# Patient Record
Sex: MALE | Race: ASIAN | NOT HISPANIC OR LATINO | Employment: UNEMPLOYED | URBAN - METROPOLITAN AREA
[De-identification: names, ages, dates, MRNs, and addresses within clinical notes are randomized per-mention and may not be internally consistent; named-entity substitution may affect disease eponyms.]

---

## 2017-09-27 ENCOUNTER — OFFICE VISIT (OUTPATIENT)
Dept: URGENT CARE | Facility: URGENT CARE | Age: 32
End: 2017-09-27
Payer: COMMERCIAL

## 2017-09-27 VITALS
HEART RATE: 87 BPM | OXYGEN SATURATION: 97 % | BODY MASS INDEX: 32.17 KG/M2 | TEMPERATURE: 97.3 F | DIASTOLIC BLOOD PRESSURE: 85 MMHG | SYSTOLIC BLOOD PRESSURE: 135 MMHG | WEIGHT: 211.56 LBS

## 2017-09-27 DIAGNOSIS — L08.9 SKIN INFECTION: Primary | ICD-10-CM

## 2017-09-27 PROCEDURE — 99203 OFFICE O/P NEW LOW 30 MIN: CPT | Performed by: PHYSICIAN ASSISTANT

## 2017-09-27 RX ORDER — SULFAMETHOXAZOLE/TRIMETHOPRIM 800-160 MG
1 TABLET ORAL 2 TIMES DAILY
Qty: 20 TABLET | Refills: 0 | Status: SHIPPED | OUTPATIENT
Start: 2017-09-27 | End: 2021-09-17

## 2017-09-27 NOTE — MR AVS SNAPSHOT
"              After Visit Summary   9/27/2017    Alexander Mccoy    MRN: [id number]           Patient Information     Date Of Birth          1985        Visit Information        Provider Department      9/27/2017 7:35 PM Teri Jorge PA-C [facility]        Today's Diagnoses     Skin infection    -  1      Care Instructions    (L08.9) Skin infection  (primary encounter diagnosis)  Comment:   Plan: sulfamethoxazole-trimethoprim (BACTRIM         DS/SEPTRA DS) 800-160 MG per tablet        Take with food.     Warm compress (such as a hot water bottle: filled with warm water) to the lump for 20 minutes 3 times a day until it has resolved.      Establish care with primary clinic for full physical.                Follow-ups after your visit        Who to contact     If you have questions or need follow up information about today's clinic visit or your schedule please contact [facility] directly at [phone].  Normal or non-critical lab and imaging results will be communicated to you by V2contacthart, letter or phone within 4 business days after the clinic has received the results. If you do not hear from us within 7 days, please contact the clinic through V2contacthart or phone. If you have a critical or abnormal lab result, we will notify you by phone as soon as possible.  Submit refill requests through [employer] or call your pharmacy and they will forward the refill request to us. Please allow 3 business days for your refill to be completed.          Additional Information About Your Visit        V2contacthart Information     [employer] lets you send messages to your doctor, view your test results, renew your prescriptions, schedule appointments and more. To sign up, go to [email or web address] . Click on \"Log in\" on the left side of the screen, which will take you to the Welcome page. Then click on \"Sign up Now\" on the right side of the page.     You will be asked " to enter the access code listed below, as well as some personal information. Please follow the directions to create your username and password.     Your access code is: [email or web address]  Expires: 2017  8:20 PM     Your access code will  in 90 days. If you need help or a new code, please call your [place] clinic or [phone].        Care EveryWhere ID     This is your Care EveryWhere ID. This could be used by other organizations to access your [place] medical records  [id number]        Your Vitals Were     Pulse Temperature Pulse Oximetry BMI (Body Mass Index)          87 97.3  F (36.3  C) (Oral) 97% 32.17 kg/m2         Blood Pressure from Last 3 Encounters:   17 135/85   12 122/79    Weight from Last 3 Encounters:   17 211 lb 9 oz (96 kg)   12 211 lb 12.8 oz (96.1 kg)              Today, you had the following     No orders found for display         Today's Medication Changes          These changes are accurate as of: 17  8:20 PM.  If you have any questions, ask your nurse or doctor.               Start taking these medicines.        Dose/Directions    sulfamethoxazole-trimethoprim 800-160 MG per tablet   Commonly known as:  BACTRIM DS/SEPTRA DS   Used for:  Skin infection   Started by:  Teri Jorge PA-C        Dose:  1 tablet   Take 1 tablet by mouth 2 times daily   Quantity:  20 tablet   Refills:  0            Where to get your medicines      These medications were sent to [facility] [id number] [place] [address] [address] AT [facility] [address]   [address], [facility] [address]    Hours:  24-hours Phone:  [phone]     sulfamethoxazole-trimethoprim 800-160 MG per tablet                Primary Care Provider    None Specified       No primary provider on file.        Equal Access to Services     MARCEL MCKOY AH: Hui Elaine, waaxda luqadaha, qaybta kaalmaarturo sosa. So  [facility] [phone].    ATENCIÓN: Si melindala mel, tiene a marshall disposición servicios gratuitos de asistencia lingüística. Leo stanford [phone].    We comply with applicable federal civil rights laws and [place] laws. We do not discriminate on the basis of race, color, national origin, age, disability sex, sexual orientation or gender identity.            Thank you!     Thank you for choosing [facility]  for your care. Our goal is always to provide you with excellent care. Hearing back from our patients is one way we can continue to improve our services. Please take a few minutes to complete the written survey that you may receive in the mail after your visit with us. Thank you!             Your Updated Medication List - Protect others around you: Learn how to safely use, store and throw away your medicines at www.disposemymeds.org.          This list is accurate as of: 9/27/17  8:20 PM.  Always use your most recent med list.                   Brand Name Dispense Instructions for use Diagnosis    sulfamethoxazole-trimethoprim 800-160 MG per tablet    BACTRIM DS/SEPTRA DS    20 tablet    Take 1 tablet by mouth 2 times daily    Skin infection

## 2017-09-28 NOTE — NURSING NOTE
"Chief Complaint   Patient presents with     Elbow right     swelling of right elbow.      Urgent Care     request lab testing to be checked for diabetes.       Initial /85  Pulse 87  Temp 97.3  F (36.3  C) (Oral)  Wt 211 lb 9 oz (96 kg)  SpO2 97%  BMI 32.17 kg/m2 Estimated body mass index is 32.17 kg/(m^2) as calculated from the following:    Height as of 3/27/12: 5' 8\" (1.727 m).    Weight as of this encounter: 211 lb 9 oz (96 kg).  Medication Reconciliation: complete    "

## 2017-09-28 NOTE — PATIENT INSTRUCTIONS
(L08.9) Skin infection  (primary encounter diagnosis)  Comment:   Plan: sulfamethoxazole-trimethoprim (BACTRIM         DS/SEPTRA DS) 800-160 MG per tablet        Take with food.     Warm compress (such as a hot water bottle: filled with warm water) to the lump for 20 minutes 3 times a day until it has resolved.      Establish care with primary clinic for full physical.

## 2017-09-28 NOTE — PROGRESS NOTES
SUBJECTIVE:  Alexander Mccoy is a 32 year old male who presents to the clinic today for  1) red lump on his right elbow for the past couple of weeks, worsening.    No fevers.     He does not currently have a primary clinic.  No family history of diabetes    No itching.       Symptoms are moderate    He would like to have a checkup    Past Medical History:   Diagnosis Date     Asthma     as a kid       No Known Allergies  Social History   Substance Use Topics     Smoking status: Never Smoker     Smokeless tobacco: Never Used     Alcohol use No       ROS:  CONSTITUTIONAL:NEGATIVE for fever, chills, change in weight  INTEGUMENTARY/SKIN: as per HPI  MUSCULOSKELETAL: NEGATIVE for significant arthralgias or myalgia    EXAM:   /85  Pulse 87  Temp 97.3  F (36.3  C) (Oral)  Wt 211 lb 9 oz (96 kg)  SpO2 97%  BMI 32.17 kg/m2  GENERAL: alert, no acute distress.  SKIN:   Right elbow: erythematous raised lump on lateral elbow with central scab.  Tender.  No drainage.  1 cm in diameter. PAtient has a few other healing excoriated lesions on forearms. No other rashes or lesions.    EXT: elbow with FROM.  VASC: cap refill is less than 2 seconds.        (L08.9) Skin infection  (primary encounter diagnosis)  Comment:   Plan: sulfamethoxazole-trimethoprim (BACTRIM         DS/SEPTRA DS) 800-160 MG per tablet        Take with food.     Warm compress (such as a hot water bottle: filled with warm water) to the lump for 20 minutes 3 times a day until it has resolved.      Establish care with primary clinic for full physical.        Patient expresses understanding and agreement with the assessment and plan as above.

## 2018-11-12 ENCOUNTER — THERAPY VISIT (OUTPATIENT)
Dept: PHYSICAL THERAPY | Facility: CLINIC | Age: 33
End: 2018-11-12
Payer: COMMERCIAL

## 2018-11-12 DIAGNOSIS — M54.41 MIDLINE LOW BACK PAIN WITH RIGHT-SIDED SCIATICA: Primary | ICD-10-CM

## 2018-11-12 PROCEDURE — 97161 PT EVAL LOW COMPLEX 20 MIN: CPT | Mod: GP | Performed by: PHYSICAL THERAPIST

## 2018-11-12 PROCEDURE — 97110 THERAPEUTIC EXERCISES: CPT | Mod: GP | Performed by: PHYSICAL THERAPIST
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
## 2018-11-12 NOTE — MR AVS SNAPSHOT
After Visit Summary   11/12/2018    Alexander Mccoy    MRN: [id number]           Patient Information     Date Of Birth          1985        Visit Information        Provider Department      11/12/2018 4:00 PM Radha Knight, PT [facility] Physical Therapy        Today's Diagnoses     Midline low back pain with right-sided sciatica    -  1       Follow-ups after your visit        Your next 10 appointments already scheduled     Nov 19, 2018  4:00 PM CST   YAJAIR[facility] Spine with Radha Knight PT   [facility] Physical Therapy ([facility]  )    [address]  [facility] [address]   [phone]            Nov 23, 2018  4:00 PM CST   YAJAIRA Spine with Radha Ludwig, PT   [facility] Physical Therapy ([facility]  )    [address]  [facility] [address]   [phone]            Nov 26, 2018  4:00 PM CST   YAJAIRA Spine with Radha Knight PT   [facility] Physical Therapy ([facility]  )    [address]  [facility] [address]   [phone]            Nov 30, 2018  4:00 PM CST   YAJAIRA Spine with Radha Ludwig, PT   [facility] Physical Therapy ([facility]  )    [address]  [facility] [address]   [phone]            Dec 03, 2018  4:00 PM CST   YAJAIRA Spine with Radha Knight PT   [facility] Physical Therapy ([facility]  )    [address]  [facility] [address]   [phone]            Dec 07, 2018  4:00 PM CST   YAJAIRA Spine with Radha Ludwig, PT   [facility] Physical Therapy ([facility]  )    [address]  [facility] [address]   [phone]              Who to contact     If you have questions or need follow up information about today's clinic visit or your  "schedule please contact [facility] PHYSICAL THERAP[facility] directly at [phone].  Normal or non-critical lab and imaging results will be communicated to you by MyChart, letter or phone within 4 business days after the clinic has received the results. If you do not hear from us within 7 days, please contact the clinic through [employer] or phone. If you have a critical or abnormal lab result, we will notify you by phone as soon as possible.  Submit refill requests through [employer] or call your pharmacy and they will forward the refill request to us. Please allow 3 business days for your refill to be completed.          Additional Information About Your Visit        [employer] Information     [employer] lets you send messages to your doctor, view your test results, renew your prescriptions, schedule appointments and more. To sign up, go to [email or web address] . Click on \"Log in\" on the left side of the screen, which will take you to the Welcome page. Then click on \"Sign up Now\" on the right side of the page.     You will be asked to enter the access code listed below, as well as some personal information. Please follow the directions to create your username and password.     Your access code is: [email or web address]  Expires: 2/10/2019  6:15 PM     Your access code will  in 90 days. If you need help or a new code, please call your [place] clinic or [phone].        Care EveryWhere ID     This is your Care EveryWhere ID. This could be used by other organizations to access your [place] medical records  [id number]         Blood Pressure from Last 3 Encounters:   17 135/85   12 122/79    Weight from Last 3 Encounters:   17 96 kg (211 lb 9 oz)   12 96.1 kg (211 lb 12.8 oz)              We Performed the Following     HC PT EVAL, LOW COMPLEXITY     YAJAIRA INITIAL EVAL REPORT     THERAPEUTIC EXERCISES        Primary Care Provider Fax #    Physician No Ref-Primary " [phone]       No address on file        Equal Access to Services     MARCEL EAN : Hadii aad ku hadgracemadison Lisadavid, wabrennatara berkowitz, maciejsteph torreznelsontara escamilla, arturo quickmathewdexter simmons. So [facility] [phone].    ATENCIÓN: Si habla español, tiene a marshall disposición servicios gratuitos de asistencia lingüística. Llame al [phone].    We comply with applicable federal civil rights laws and [place] laws. We do not discriminate on the basis of race, color, national origin, age, disability, sex, sexual orientation, or gender identity.            Thank you!     Thank you for choosing [place] [facility] PHYSICAL THERAPY  for your care. Our goal is always to provide you with excellent care. Hearing back from our patients is one way we can continue to improve our services. Please take a few minutes to complete the written survey that you may receive in the mail after your visit with us. Thank you!             Your Updated Medication List - Protect others around you: Learn how to safely use, store and throw away your medicines at www.disposemymeds.org.          This list is accurate as of 11/12/18  6:15 PM.  Always use your most recent med list.                   Brand Name Dispense Instructions for use Diagnosis    sulfamethoxazole-trimethoprim 800-160 MG per tablet    BACTRIM DS/SEPTRA DS    20 tablet    Take 1 tablet by mouth 2 times daily    Skin infection
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
## 2018-11-23 ENCOUNTER — THERAPY VISIT (OUTPATIENT)
Dept: PHYSICAL THERAPY | Facility: CLINIC | Age: 33
End: 2018-11-23
Payer: COMMERCIAL

## 2018-11-23 DIAGNOSIS — M54.41 MIDLINE LOW BACK PAIN WITH RIGHT-SIDED SCIATICA: ICD-10-CM

## 2018-11-23 PROCEDURE — 97110 THERAPEUTIC EXERCISES: CPT | Mod: GP | Performed by: PHYSICAL THERAPIST

## 2018-11-23 PROCEDURE — 97530 THERAPEUTIC ACTIVITIES: CPT | Mod: GP | Performed by: PHYSICAL THERAPIST

## 2018-11-23 PROCEDURE — 97112 NEUROMUSCULAR REEDUCATION: CPT | Mod: GP | Performed by: PHYSICAL THERAPIST

## 2018-11-26 ENCOUNTER — THERAPY VISIT (OUTPATIENT)
Dept: PHYSICAL THERAPY | Facility: CLINIC | Age: 33
End: 2018-11-26
Payer: COMMERCIAL

## 2018-11-26 DIAGNOSIS — M54.41 CHRONIC MIDLINE LOW BACK PAIN WITH RIGHT-SIDED SCIATICA: ICD-10-CM

## 2018-11-26 DIAGNOSIS — G89.29 CHRONIC MIDLINE LOW BACK PAIN WITH RIGHT-SIDED SCIATICA: ICD-10-CM

## 2018-11-26 PROCEDURE — 97110 THERAPEUTIC EXERCISES: CPT | Mod: GP | Performed by: PHYSICAL THERAPIST

## 2018-11-26 PROCEDURE — 97140 MANUAL THERAPY 1/> REGIONS: CPT | Mod: GP | Performed by: PHYSICAL THERAPIST

## 2018-11-26 PROCEDURE — 97112 NEUROMUSCULAR REEDUCATION: CPT | Mod: GP | Performed by: PHYSICAL THERAPIST

## 2018-12-03 ENCOUNTER — THERAPY VISIT (OUTPATIENT)
Dept: PHYSICAL THERAPY | Facility: CLINIC | Age: 33
End: 2018-12-03
Payer: COMMERCIAL

## 2018-12-03 DIAGNOSIS — M54.41 CHRONIC MIDLINE LOW BACK PAIN WITH RIGHT-SIDED SCIATICA: ICD-10-CM

## 2018-12-03 DIAGNOSIS — G89.29 CHRONIC MIDLINE LOW BACK PAIN WITH RIGHT-SIDED SCIATICA: ICD-10-CM

## 2018-12-03 PROCEDURE — 97110 THERAPEUTIC EXERCISES: CPT | Mod: GP | Performed by: PHYSICAL THERAPIST

## 2018-12-03 PROCEDURE — 97140 MANUAL THERAPY 1/> REGIONS: CPT | Mod: GP | Performed by: PHYSICAL THERAPIST

## 2018-12-07 ENCOUNTER — THERAPY VISIT (OUTPATIENT)
Dept: PHYSICAL THERAPY | Facility: CLINIC | Age: 33
End: 2018-12-07
Payer: COMMERCIAL

## 2018-12-07 DIAGNOSIS — G89.29 CHRONIC MIDLINE LOW BACK PAIN WITH RIGHT-SIDED SCIATICA: ICD-10-CM

## 2018-12-07 DIAGNOSIS — M54.41 CHRONIC MIDLINE LOW BACK PAIN WITH RIGHT-SIDED SCIATICA: ICD-10-CM

## 2018-12-07 PROCEDURE — 97110 THERAPEUTIC EXERCISES: CPT | Mod: GP | Performed by: PHYSICAL THERAPIST

## 2018-12-07 PROCEDURE — 97530 THERAPEUTIC ACTIVITIES: CPT | Mod: GP | Performed by: PHYSICAL THERAPIST

## 2018-12-14 ENCOUNTER — THERAPY VISIT (OUTPATIENT)
Dept: PHYSICAL THERAPY | Facility: CLINIC | Age: 33
End: 2018-12-14
Payer: COMMERCIAL

## 2018-12-14 DIAGNOSIS — M54.41 CHRONIC MIDLINE LOW BACK PAIN WITH RIGHT-SIDED SCIATICA: ICD-10-CM

## 2018-12-14 DIAGNOSIS — G89.29 CHRONIC MIDLINE LOW BACK PAIN WITH RIGHT-SIDED SCIATICA: ICD-10-CM

## 2018-12-14 PROCEDURE — 97110 THERAPEUTIC EXERCISES: CPT | Mod: GP | Performed by: PHYSICAL THERAPIST

## 2018-12-14 PROCEDURE — 97530 THERAPEUTIC ACTIVITIES: CPT | Mod: GP | Performed by: PHYSICAL THERAPIST

## 2018-12-28 ENCOUNTER — THERAPY VISIT (OUTPATIENT)
Dept: PHYSICAL THERAPY | Facility: CLINIC | Age: 33
End: 2018-12-28
Payer: COMMERCIAL

## 2018-12-28 DIAGNOSIS — G89.29 CHRONIC MIDLINE LOW BACK PAIN WITH RIGHT-SIDED SCIATICA: ICD-10-CM

## 2018-12-28 DIAGNOSIS — M54.41 CHRONIC MIDLINE LOW BACK PAIN WITH RIGHT-SIDED SCIATICA: ICD-10-CM

## 2018-12-28 PROCEDURE — 97530 THERAPEUTIC ACTIVITIES: CPT | Mod: GP | Performed by: PHYSICAL THERAPIST

## 2018-12-28 PROCEDURE — 97110 THERAPEUTIC EXERCISES: CPT | Mod: GP | Performed by: PHYSICAL THERAPIST

## 2019-01-11 ENCOUNTER — THERAPY VISIT (OUTPATIENT)
Dept: PHYSICAL THERAPY | Facility: CLINIC | Age: 34
End: 2019-01-11
Payer: COMMERCIAL

## 2019-01-11 DIAGNOSIS — G89.29 CHRONIC MIDLINE LOW BACK PAIN WITH RIGHT-SIDED SCIATICA: ICD-10-CM

## 2019-01-11 DIAGNOSIS — M54.41 CHRONIC MIDLINE LOW BACK PAIN WITH RIGHT-SIDED SCIATICA: ICD-10-CM

## 2019-01-11 PROCEDURE — 97530 THERAPEUTIC ACTIVITIES: CPT | Mod: GP | Performed by: PHYSICAL THERAPIST

## 2019-01-11 PROCEDURE — 97110 THERAPEUTIC EXERCISES: CPT | Mod: GP | Performed by: PHYSICAL THERAPIST

## 2019-01-18 ENCOUNTER — THERAPY VISIT (OUTPATIENT)
Dept: PHYSICAL THERAPY | Facility: CLINIC | Age: 34
End: 2019-01-18
Payer: COMMERCIAL

## 2019-01-18 DIAGNOSIS — M54.41 CHRONIC MIDLINE LOW BACK PAIN WITH RIGHT-SIDED SCIATICA: ICD-10-CM

## 2019-01-18 DIAGNOSIS — G89.29 CHRONIC MIDLINE LOW BACK PAIN WITH RIGHT-SIDED SCIATICA: ICD-10-CM

## 2019-01-18 PROCEDURE — 97530 THERAPEUTIC ACTIVITIES: CPT | Mod: GP | Performed by: PHYSICAL THERAPIST

## 2019-01-18 PROCEDURE — 97110 THERAPEUTIC EXERCISES: CPT | Mod: GP | Performed by: PHYSICAL THERAPIST

## 2019-03-01 ENCOUNTER — THERAPY VISIT (OUTPATIENT)
Dept: PHYSICAL THERAPY | Facility: CLINIC | Age: 34
End: 2019-03-01
Payer: COMMERCIAL

## 2019-03-01 DIAGNOSIS — M54.41 CHRONIC MIDLINE LOW BACK PAIN WITH RIGHT-SIDED SCIATICA: ICD-10-CM

## 2019-03-01 DIAGNOSIS — G89.29 CHRONIC MIDLINE LOW BACK PAIN WITH RIGHT-SIDED SCIATICA: ICD-10-CM

## 2019-03-01 PROCEDURE — 97110 THERAPEUTIC EXERCISES: CPT | Mod: GP | Performed by: PHYSICAL THERAPIST

## 2019-03-01 PROCEDURE — 97112 NEUROMUSCULAR REEDUCATION: CPT | Mod: GP | Performed by: PHYSICAL THERAPIST

## 2019-03-01 NOTE — LETTER
"[place] FOR ATHLETIC [facility] PHYSICAL THERAPY  [address] [place] [id number] [phone]    2019    Re: Alexander Mccoy   :   1985  MRN:  [id number]   REFERRING PHYSICIAN:   Toi Aldrich    [facility] ATHLETIC [facility] PHYSICAL THERAPY    Date of Initial Evaluation:  2018  Visits:  Rxs Used: 10  Reason for Referral:  Chronic midline low back pain with right-sided sciatica    DISCHARGE REPORT  Progress reporting period is from 18 to 3-1-18, 10 visits (6 week gap between last 2 visits).       SUBJECTIVE  Patient initially had c/o back pain to the upper calf, increased pain with walking (1/2 mile), standing (after 1.5-2 hours, 7-8/10 pain if working more than 6 hours, 5-6/10.) as well as tingling and numbness.      At last visit today he had not been in for PT due to \"personal reasons\" (per patient) and needing to find another job, etc.  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  Pain is near constant left buttock, up to 8/10, intermittent left LB and left posterior thigh and calf.  Pain increases with leaning to the right, sitting >1.5 to 2 hours produces numbness left LE,  walking/standing 1-2 hours, unable to get to sleep up to 3 hours/wakes intermittently with moving in bed.  Overall thinks he is improving - pain less irritable overall per patient.  Patient reports he has not been doing exercises.  When sitting uses a \"couch pillow\" behind his back, otherwise not sitting with support.  Has not been doing exercises except for \"stretching\" routine at work before start of shift and alternate knee extension in sitting intermittently.   *Subjective examination very difficult as patient was not able to answer questions directly and had to ask each several times/different way to get answer and still patient was unclear; cognitive difficulty.     Current Pain level: 6/10(left buttock pain).     Initial Pain " level: 8/10.   Changes in function:  questionable  Adverse reaction to treatment or activity: None    OBJECTIVE  Today trunk extension AROM 33%, flexion 50% with pain.    Patient demonstrates poor sitting posture.    **Very difficult to get patient to follow instructions and do positioning/exercises and relay specific symptoms during/after.            ASSESSMENT/PLAN  Updated problem list and treatment plan: Diagnosis 1:  LB and left LE pain  Pain -  home program  Decreased ROM/flexibility - home program  Decreased function - home program  Impaired posture - home program  STG/LTGs have been met or progress has been made towards goals:  None  Assessment of Progress: Not making progress - difficult due to inconsistency with visits and non-compliance with home program as prescribed  Self Management Plans:  Patient has been instructed in a home treatment program.    Recommendations:  This patient is ready to be discharged from therapy and continue their home treatment program due to lack of progress and patient follow-through.  Patient to return to his MD for further evaluation.    Thank you for your referral.    INQUIRIES  Therapist: Alisha Palma PT   [facility] [place] [facility]  [address] [address]  Phone: [phone]  Fax: [phone]

## 2019-03-04 PROBLEM — M54.41 MIDLINE LOW BACK PAIN WITH RIGHT-SIDED SCIATICA: Status: RESOLVED | Noted: 2018-11-12 | Resolved: 2019-03-04

## 2019-03-04 NOTE — PROGRESS NOTES
"Subjective:  HPI                    Objective:  System    Physical Exam    General     ROS    Assessment/Plan:    DISCHARGE REPORT    Progress reporting period is from 11-21-18 to 3-1-18, 10 visits (6 week gap between last 2 visits).       SUBJECTIVE  Patient initially had c/o back pain to the upper calf, increased pain with walking (1/2 mile), standing (after 1.5-2 hours, 7-8/10 pain if working more than 6 hours, 5-6/10.) as well as tingling and numbness.      At last visit today he had not been in for PT due to \"personal reasons\" (per patient) and needing to find another job, etc.  Now working FT in warehouse, driving forklift and moving product.  Pain is near constant left buttock, up to 8/10, intermittent left LB and left posterior thigh and calf.  Pain increases with leaning to the right, sitting >1.5 to 2 hours produces numbness left LE,  walking/standing 1-2 hours, unable to get to sleep up to 3 hours/wakes intermittently with moving in bed.  Overall thinks he is improving - pain less irritable overall per patient.  Patient reports he has not been doing exercises.  When sitting uses a \"couch pillow\" behind his back, otherwise not sitting with support.  Has not been doing exercises except for \"stretching\" routine at work before start of shift and alternate knee extension in sitting intermittently.   *Subjective examination very difficult as patient was not able to answer questions directly and had to ask each several times/different way to get answer and still patient was unclear; cognitive difficulty.     Current Pain level: 6/10(left buttock pain).      Initial Pain level: 8/10.   Changes in function:  questionable  Adverse reaction to treatment or activity: None    OBJECTIVE  Today trunk extension AROM 33%, flexion 50% with pain.    Patient demonstrates poor sitting posture.    **Very difficult to get patient to follow instructions and do positioning/exercises and relay specific symptoms during/after.  "     ASSESSMENT/PLAN  Updated problem list and treatment plan: Diagnosis 1:  LB and left LE pain    Pain -  home program  Decreased ROM/flexibility - home program  Decreased function - home program  Impaired posture - home program  STG/LTGs have been met or progress has been made towards goals:  None  Assessment of Progress: Not making progress - difficult due to inconsistency with visits and non-compliance with home program as prescribed  Self Management Plans:  Patient has been instructed in a home treatment program.      Recommendations:  This patient is ready to be discharged from therapy and continue their home treatment program due to lack of progress and patient follow-through.  Patient to return to his MD for further evaluation.    Please refer to the daily flowsheet for treatment today, total treatment time and time spent performing 1:1 timed codes.

## 2021-09-17 ENCOUNTER — OFFICE VISIT (OUTPATIENT)
Dept: URGENT CARE | Facility: URGENT CARE | Age: 36
End: 2021-09-17
Payer: COMMERCIAL

## 2021-09-17 ENCOUNTER — ANCILLARY PROCEDURE (OUTPATIENT)
Dept: GENERAL RADIOLOGY | Facility: CLINIC | Age: 36
End: 2021-09-17
Attending: PHYSICIAN ASSISTANT
Payer: COMMERCIAL

## 2021-09-17 VITALS
BODY MASS INDEX: 28.13 KG/M2 | SYSTOLIC BLOOD PRESSURE: 134 MMHG | DIASTOLIC BLOOD PRESSURE: 78 MMHG | TEMPERATURE: 98.7 F | WEIGHT: 185 LBS | OXYGEN SATURATION: 98 % | HEART RATE: 105 BPM

## 2021-09-17 DIAGNOSIS — M54.50 LUMBAR PAIN WITH RADIATION DOWN RIGHT LEG: ICD-10-CM

## 2021-09-17 DIAGNOSIS — M79.604 LUMBAR PAIN WITH RADIATION DOWN RIGHT LEG: ICD-10-CM

## 2021-09-17 DIAGNOSIS — E11.69 TYPE 2 DIABETES MELLITUS WITH OTHER SPECIFIED COMPLICATION, UNSPECIFIED WHETHER LONG TERM INSULIN USE (H): ICD-10-CM

## 2021-09-17 DIAGNOSIS — R52 PAIN: ICD-10-CM

## 2021-09-17 DIAGNOSIS — Z91.199 NON COMPLIANCE WITH MEDICAL TREATMENT: ICD-10-CM

## 2021-09-17 DIAGNOSIS — R52 PAIN: Primary | ICD-10-CM

## 2021-09-17 LAB
ALBUMIN UR-MCNC: NEGATIVE MG/DL
APPEARANCE UR: CLEAR
BILIRUB UR QL STRIP: NEGATIVE
COLOR UR AUTO: YELLOW
GLUCOSE UR STRIP-MCNC: >=1000 MG/DL
HGB UR QL STRIP: NEGATIVE
KETONES UR STRIP-MCNC: NEGATIVE MG/DL
LEUKOCYTE ESTERASE UR QL STRIP: NEGATIVE
NITRATE UR QL: NEGATIVE
PH UR STRIP: 6 [PH] (ref 5–7)
SP GR UR STRIP: 1.01 (ref 1–1.03)
UROBILINOGEN UR STRIP-ACNC: 0.2 E.U./DL

## 2021-09-17 PROCEDURE — 81003 URINALYSIS AUTO W/O SCOPE: CPT | Performed by: PHYSICIAN ASSISTANT

## 2021-09-17 PROCEDURE — 72100 X-RAY EXAM L-S SPINE 2/3 VWS: CPT | Performed by: RADIOLOGY

## 2021-09-17 PROCEDURE — 99204 OFFICE O/P NEW MOD 45 MIN: CPT | Performed by: PHYSICIAN ASSISTANT

## 2021-09-17 RX ORDER — METHOCARBAMOL 750 MG/1
750 TABLET, FILM COATED ORAL 4 TIMES DAILY PRN
Qty: 30 TABLET | Refills: 0 | Status: SHIPPED | OUTPATIENT
Start: 2021-09-17 | End: 2023-04-18

## 2021-09-17 RX ORDER — HYDROCODONE BITARTRATE AND ACETAMINOPHEN 5; 325 MG/1; MG/1
1 TABLET ORAL EVERY 6 HOURS PRN
Qty: 10 TABLET | Refills: 0 | Status: SHIPPED | OUTPATIENT
Start: 2021-09-17 | End: 2021-09-20

## 2021-09-22 NOTE — PROGRESS NOTES
Assessment & Plan     Pain  UA negative for infection  - UA macro with reflex to Microscopic and Culture - Clinc Collect  - XR Lumbar Spine 2/3 Views; Future  - UA with Microscopic reflex to Culture  - HYDROcodone-acetaminophen (NORCO) 5-325 MG tablet; Take 1 tablet by mouth every 6 hours as needed for severe pain    Lumbar pain with radiation down right leg  Lumbar xray Negative for acute findings, read by Lawrence Maldonado PA-C [facility] at time of visit.  Vicodin for pain  Robaxin for muscle relaxation  - XR Lumbar Spine 2/3 Views; Future  - UA with Microscopic reflex to Culture  - HYDROcodone-acetaminophen (NORCO) 5-325 MG tablet; Take 1 tablet by mouth every 6 hours as needed for severe pain  - methocarbamol (ROBAXIN) 750 MG tablet; Take 1 tablet (750 mg) by mouth 4 times daily as needed    Type 2 diabetes mellitus with other specified complication, unspecified whether long term insulin use (H)  Monitor blood sugars while on steroids    Non compliance with medical treatment  Advise to have follow up with PCP for diabetes as he is not taking medications nor is he talking care of his diabetes      Review of external notes as documented elsewhere in note         CONSULTATION/REFERRAL to PCP    Lawrence Maldonado PA-C  [facility] LUIS Munoz is a 36 year old who presents for the following health issues     HPI     Lower back pain   Radicular pain into right leg  Patient has diabetes but isnt taking medications    Review of Systems   Constitutional, HEENT, cardiovascular, pulmonary, gi and gu systems are negative, except as otherwise noted.      Objective    /78   Pulse 105   Temp 98.7  F (37.1  C)   Wt 83.9 kg (185 lb)   SpO2 98%   BMI 28.13 kg/m    Body mass index is 28.13 kg/m .  Physical Exam   GENERAL: healthy, alert and no distress  RESP: lungs clear to auscultation - no rales, rhonchi or wheezes  CV: regular rate and rhythm, normal S1 S2, no S3 or S4, no murmur, click or  rub, no peripheral edema and peripheral pulses strong  ABDOMEN: soft, nontender, no hepatosplenomegaly, no masses and bowel sounds normal  MS: Positive for lower back tenderness lumbar with radicular   SKIN: no suspicious lesions or rashes  NEURO: Positive for radicular pain  PSYCH: mentation appears normal, affect normal/bright    Results for orders placed or performed in visit on 09/17/21   XR Lumbar Spine 2/3 Views     Status: None    Narrative    LUMBAR SPINE TWO - THREE VIEWS  9/17/2021 5:01 PM     HISTORY: Pain; Lumbar pain with radiation down right leg.    COMPARISON: None.      Impression    IMPRESSION: Minimal anterior wedging of the T11 vertebral body. This  could be physiologic. Recommend correlation for point tenderness in  this region. Otherwise, the vertebral body heights appear grossly  maintained. Alignment appears normal. The intervertebral disc spaces  are grossly maintained.    SOBIA SHELTON MD         SYSTEM ID:  [id number]   Results for orders placed or performed in visit on 09/17/21   UA macro with reflex to Microscopic and Culture - Clinc Collect     Status: Abnormal    Specimen: Urine, Midstream   Result Value Ref Range    Color Urine Yellow Colorless, Straw, Light Yellow, Yellow    Appearance Urine Clear Clear    Glucose Urine >=1000 (A) Negative mg/dL    Bilirubin Urine Negative Negative    Ketones Urine Negative Negative mg/dL    Specific Gravity Urine 1.010 1.003 - 1.035    Blood Urine Negative Negative    pH Urine 6.0 5.0 - 7.0    Protein Albumin Urine Negative Negative mg/dL    Urobilinogen Urine 0.2 0.2, 1.0 E.U./dL    Nitrite Urine Negative Negative    Leukocyte Esterase Urine Negative Negative    Narrative    Microscopic not indicated

## 2021-09-29 ENCOUNTER — OFFICE VISIT (OUTPATIENT)
Dept: URGENT CARE | Facility: URGENT CARE | Age: 36
End: 2021-09-29
Payer: COMMERCIAL

## 2021-09-29 ENCOUNTER — APPOINTMENT (OUTPATIENT)
Dept: URGENT CARE | Facility: URGENT CARE | Age: 36
End: 2021-09-29
Payer: COMMERCIAL

## 2021-09-29 VITALS
SYSTOLIC BLOOD PRESSURE: 120 MMHG | DIASTOLIC BLOOD PRESSURE: 80 MMHG | BODY MASS INDEX: 28.13 KG/M2 | RESPIRATION RATE: 20 BRPM | WEIGHT: 185 LBS | HEART RATE: 105 BPM | TEMPERATURE: 98.1 F | OXYGEN SATURATION: 97 %

## 2021-09-29 DIAGNOSIS — Z20.822 SUSPECTED COVID-19 VIRUS INFECTION: ICD-10-CM

## 2021-09-29 DIAGNOSIS — R52 PAIN: Primary | ICD-10-CM

## 2021-09-29 LAB
ALBUMIN UR-MCNC: NEGATIVE MG/DL
APPEARANCE UR: CLEAR
BILIRUB UR QL STRIP: NEGATIVE
COLOR UR AUTO: YELLOW
GLUCOSE UR STRIP-MCNC: >=1000 MG/DL
HGB UR QL STRIP: NEGATIVE
KETONES UR STRIP-MCNC: NEGATIVE MG/DL
LEUKOCYTE ESTERASE UR QL STRIP: NEGATIVE
NITRATE UR QL: NEGATIVE
PH UR STRIP: 6 [PH] (ref 5–7)
SARS-COV-2 RNA RESP QL NAA+PROBE: NEGATIVE
SP GR UR STRIP: 1.01 (ref 1–1.03)
UROBILINOGEN UR STRIP-ACNC: 0.2 E.U./DL

## 2021-09-29 PROCEDURE — 99213 OFFICE O/P EST LOW 20 MIN: CPT | Performed by: PHYSICIAN ASSISTANT

## 2021-09-29 PROCEDURE — U0005 INFEC AGEN DETEC AMPLI PROBE: HCPCS | Performed by: PHYSICIAN ASSISTANT

## 2021-09-29 PROCEDURE — 81003 URINALYSIS AUTO W/O SCOPE: CPT

## 2021-09-29 PROCEDURE — U0003 INFECTIOUS AGENT DETECTION BY NUCLEIC ACID (DNA OR RNA); SEVERE ACUTE RESPIRATORY SYNDROME CORONAVIRUS 2 (SARS-COV-2) (CORONAVIRUS DISEASE [COVID-19]), AMPLIFIED PROBE TECHNIQUE, MAKING USE OF HIGH THROUGHPUT TECHNOLOGIES AS DESCRIBED BY CMS-2020-01-R: HCPCS | Performed by: PHYSICIAN ASSISTANT
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
## 2022-10-16 ENCOUNTER — OFFICE VISIT (OUTPATIENT)
Dept: URGENT CARE | Facility: URGENT CARE | Age: 37
End: 2022-10-16
Payer: COMMERCIAL

## 2022-10-16 VITALS
OXYGEN SATURATION: 96 % | WEIGHT: 190 LBS | DIASTOLIC BLOOD PRESSURE: 89 MMHG | TEMPERATURE: 98.4 F | RESPIRATION RATE: 20 BRPM | SYSTOLIC BLOOD PRESSURE: 131 MMHG | HEART RATE: 112 BPM

## 2022-10-16 DIAGNOSIS — E11.69 TYPE 2 DIABETES MELLITUS WITH OTHER SPECIFIED COMPLICATION, UNSPECIFIED WHETHER LONG TERM INSULIN USE (H): Primary | ICD-10-CM

## 2022-10-16 DIAGNOSIS — Z91.199 NON COMPLIANCE WITH MEDICAL TREATMENT: ICD-10-CM

## 2022-10-16 DIAGNOSIS — H93.13 TINNITUS, BILATERAL: ICD-10-CM

## 2022-10-16 PROCEDURE — 99214 OFFICE O/P EST MOD 30 MIN: CPT | Performed by: PHYSICIAN ASSISTANT

## 2022-10-16 RX ORDER — FLUTICASONE PROPIONATE 50 MCG
1 SPRAY, SUSPENSION (ML) NASAL DAILY
Qty: 16 G | Refills: 0 | Status: SHIPPED | OUTPATIENT
Start: 2022-10-16 | End: 2023-04-18

## 2022-10-16 RX ORDER — METFORMIN HCL 500 MG
TABLET, EXTENDED RELEASE 24 HR ORAL
COMMUNITY
Start: 2021-01-12 | End: 2022-10-16

## 2022-10-16 RX ORDER — METFORMIN HCL 500 MG
TABLET, EXTENDED RELEASE 24 HR ORAL
Qty: 120 TABLET | Refills: 0 | Status: ON HOLD | OUTPATIENT
Start: 2022-10-16 | End: 2023-05-25

## 2022-10-16 NOTE — PROGRESS NOTES
"Patient presents with:  Tinnitus: Both ears are bothering him intermittently for 1 year. But getting worse over past week.  Derm Problem: Has bump(s) on scalp behind both ears    (E11.69) Type 2 diabetes mellitus with other specified complication, unspecified whether long term insulin use (H)  (primary encounter diagnosis)  Comment: currently off of medication.  Declines blood testing today.  Plan: metFORMIN (GLUCOPHAGE XR) 500 MG 24 hr tablet        I advised patient that he needs to follow up with primary care or endocrinology.  I opted to prescribe the metformin in hopes that he will at least re-consider and take something to help his diabetes which I am certain is out of control     (H93.13) Tinnitus, bilateral  Comment: patient has mild underlying allergic symptoms, will try allergy medication to see if this offers any relief.  Also tumeric may increase tinnitus symptoms  Plan: fluticasone (FLONASE) 50 MCG/ACT nasal spray  Ultimately needs further evaluation however patient declines any lab work today.  Advise cessation of tumeric to see if this offers relief as well.          (Z91.199) Non compliance with medical treatment  Comment: patient at first declines that he has diabetes, then asks for a refill of his metformin, yet declines any blood work.  He states that he \"has a strong liver and the metformin hurts my liver\".  Plan:       Establish care with primary clinic and follow up within 2-4 weeks for management of your diabetes.    At end of visit patient decided that he wanted to retract his request for care and did not want me to send the prescriptions as he did not want to pay the bill for them.  I advised him that it is in his best interest to  the medication, and that he will be billed appropriately only when he collects the medication.      51 minutes spent on the date of the encounter doing chart review, patient visit, documentation and discussion with other provider(s) trying to encourage " "patient to have blood draw and to re-establish care with a primary doctor to manage his diabetes.  I advised him of the negative effects of untreated diabetes such as heart disease, blindness, death.  He re-iterated that he did not care, yet at the onset of the exam he was concerned that the bumps on his scalp might be a brain tumor.        SUBJECTIVE:   Alexander Mccoy is a 37 year old male who presents today with:  1)  a ringing in his ears for a year \"or two\" .  Denies any aspirin use.  He does take tumeric, which he took just prior to arrival in clinic  2) bump/ridge on his posterior scalp present for a long time.  Tender when he presses on it.  Wonders if it could be a brain tumor.    Denies any fevers or cough or congestion.  Does have intermittent runny nose.  Occasional sneezing.     Of note is that upon review of his chart, I see that the patient has a h/o diabetes with a hgA1c of 8.4 about a year ago.  He states that he is not taking the metformin, he does not like how it makes him feel, and also he does not have any more.        Past Medical History:   Diagnosis Date     Asthma     as a kid          Current Outpatient Medications   Medication Sig Dispense Refill     Multiple Vitamins-Iron (DAILY-CHUCHO/IRON/BETA-CAROTENE) TABS TAKE 1 TABLET BY MOUTH DAILY. (Patient not taking: Reported on 10/19/2020) 30 tablet 7     Social History     Tobacco Use     Smoking status: Never Smoker     Smokeless tobacco: Never Used   Substance Use Topics     Alcohol use: Not on file     Family History   Problem Relation Age of Onset     Diabetes Mother      Diabetes Father          ROS:    10 point ROS of systems including Constitutional, Eyes, Respiratory, Cardiovascular, Gastroenterology, Genitourinary, Integumentary, Muscularskeletal, Psychiatric ,neurological were all negative except for pertinent positives noted in my HPI       OBJECTIVE:  /89   Pulse 112   Temp 98.4  F (36.9  C)   Resp 20   Wt 86.2 kg (190 lb)   " "SpO2 96%   Physical Exam:  GENERAL APPEARANCE: healthy, alert and no distress  EYES: EOMI,  PERRL, conjunctiva clear  HENT: ear canals and TM's normal.  Nose and mouth without ulcers, erythema or lesions  HENT: nasal turbinates boggy with bluish hue and rhinorrhea clear  NECK: supple, nontender, no lymphadenopathy  Scalp: normal ridges of scalp, no worrisome masses  RESP: lungs clear to auscultation - no rales, rhonchi or wheezes  CV: regular rates and rhythm, normal S1 S2, no murmur noted  ABDOMEN:  soft, nontender, no HSM or masses and bowel sounds normal  NEURO: Normal strength and tone, sensory exam grossly normal,  normal speech and mentation  SKIN: no suspicious lesions or rashes    Patient has multiple tangential comments during clinic visit, a couple that stand out are:    \"the [employer] is after me, they send undercover agents when I am at the [address] to take my money\"  \"the [employer] makes up stories about me\"            "

## 2022-10-16 NOTE — PATIENT INSTRUCTIONS
(E11.69) Type 2 diabetes mellitus with other specified complication, unspecified whether long term insulin use (H)  (primary encounter diagnosis)  Comment: currently off of medication.  Declines blood testing today.  Plan: metFORMIN (GLUCOPHAGE XR) 500 MG 24 hr tablet            (H93.13) Tinnitus, bilateral  Comment:   Plan: fluticasone (FLONASE) 50 MCG/ACT nasal spray            Establish care with primary clinic and follow up within 2-4 weeks for management of your diabetes

## 2022-10-17 ENCOUNTER — PATIENT OUTREACH (OUTPATIENT)
Dept: CARE COORDINATION | Facility: CLINIC | Age: 37
End: 2022-10-17

## 2022-10-17 NOTE — PROGRESS NOTES
Clinic Care Coordination Contact    [facility] spoke with pt. He stated that he was doing well and that he picked up medication. He stated that he has no needs at this time and needed to go.     Plan: [facility] to do no further outreaches.     DARNELL Saldaña  [facility] Care Coordination   [facility]  Social Work Care Coordinator  [phone]

## 2022-10-17 NOTE — PROGRESS NOTES
Clinic Care Coordination Contact  [facility]/Voicemail       Clinical Data: Care Coordinator Outreach  Outreach attempted x 1.  Person answered stating pt was not there and asked SW to call back this afternoon.   Plan: Care Coordinator will send care coordination introduction letter with care coordinator contact information and explanation of care coordination services via mail. Care Coordinator will try to reach patient again in 1-2 business days.    Stacy Greene [facility] Care Coordination   [facility]  Social Work Care Coordinator  [phone]

## 2022-10-17 NOTE — LETTER
[facility] CARE COORDINATION  No address on file    October 17, 2022    Alexander Mccoy  [address]  [place] [address]      Dear Alexander,        I am a clinic care coordinator who works with Physician Telma Ref-Primary with the [facility]. I wanted to introduce myself and provide you with my contact information for you to be able to call me with any questions or concerns. Below is a description of clinic care coordination and how I can further assist you.       The clinic care coordination team is made up of a registered nurse, , financial resource worker and community health worker who understand the health care system. The goal of clinic care coordination is to help you manage your health and improve access to the health care system. Our team works alongside your provider to assist you in determining your health and social needs. We can help you obtain health care and community resources, providing you with necessary information and education. We can work with you through any barriers and develop a care plan that helps coordinate and strengthen the communication between you and your care team.    Please feel free to contact me with any questions or concerns regarding care coordination and what we can offer.      We are focused on providing you with the highest-quality healthcare experience possible.    Sincerely,     Stacy Greene, DARNELL  [facility] Care Coordination   [facility]  Social Work Care Coordinator  [phone]

## 2023-04-18 ENCOUNTER — TELEPHONE (OUTPATIENT)
Dept: BEHAVIORAL HEALTH | Facility: CLINIC | Age: 38
End: 2023-04-18
Payer: COMMERCIAL

## 2023-04-18 ENCOUNTER — TELEPHONE (OUTPATIENT)
Dept: BEHAVIORAL HEALTH | Facility: CLINIC | Age: 38
End: 2023-04-18

## 2023-04-18 ENCOUNTER — MEDICAL CORRESPONDENCE (OUTPATIENT)
Dept: EMERGENCY MEDICINE | Facility: CLINIC | Age: 38
End: 2023-04-18

## 2023-04-18 ENCOUNTER — APPOINTMENT (OUTPATIENT)
Dept: CT IMAGING | Facility: CLINIC | Age: 38
End: 2023-04-18
Attending: EMERGENCY MEDICINE
Payer: COMMERCIAL

## 2023-04-18 ENCOUNTER — HOSPITAL ENCOUNTER (EMERGENCY)
Facility: CLINIC | Age: 38
Discharge: OTHER INSTITUTION | End: 2023-04-22
Attending: EMERGENCY MEDICINE | Admitting: EMERGENCY MEDICINE
Payer: COMMERCIAL

## 2023-04-18 DIAGNOSIS — F29 PSYCHOSIS, UNSPECIFIED PSYCHOSIS TYPE (H): ICD-10-CM

## 2023-04-18 DIAGNOSIS — R73.9 HYPERGLYCEMIA: ICD-10-CM

## 2023-04-18 DIAGNOSIS — F22 PARANOIA (H): ICD-10-CM

## 2023-04-18 LAB
ALBUMIN SERPL BCG-MCNC: 4.8 G/DL (ref 3.5–5.2)
ALBUMIN UR-MCNC: NEGATIVE MG/DL
ALP SERPL-CCNC: 117 U/L (ref 40–129)
ALT SERPL W P-5'-P-CCNC: 79 U/L (ref 10–50)
AMPHETAMINES UR QL SCN: NORMAL
ANION GAP SERPL CALCULATED.3IONS-SCNC: 14 MMOL/L (ref 7–15)
APPEARANCE UR: CLEAR
AST SERPL W P-5'-P-CCNC: 57 U/L (ref 10–50)
BARBITURATES UR QL SCN: NORMAL
BASOPHILS # BLD AUTO: 0.1 10E3/UL (ref 0–0.2)
BASOPHILS NFR BLD AUTO: 1 %
BENZODIAZ UR QL SCN: NORMAL
BILIRUB SERPL-MCNC: 0.3 MG/DL
BILIRUB UR QL STRIP: NEGATIVE
BUN SERPL-MCNC: 14.7 MG/DL (ref 6–20)
BZE UR QL SCN: NORMAL
CALCIUM SERPL-MCNC: 10 MG/DL (ref 8.6–10)
CANNABINOIDS UR QL SCN: NORMAL
CHLORIDE SERPL-SCNC: 95 MMOL/L (ref 98–107)
COLOR UR AUTO: ABNORMAL
CREAT SERPL-MCNC: 0.72 MG/DL (ref 0.67–1.17)
DEPRECATED HCO3 PLAS-SCNC: 26 MMOL/L (ref 22–29)
EOSINOPHIL # BLD AUTO: 0.4 10E3/UL (ref 0–0.7)
EOSINOPHIL NFR BLD AUTO: 4 %
ERYTHROCYTE [DISTWIDTH] IN BLOOD BY AUTOMATED COUNT: 11.8 % (ref 10–15)
ETHANOL SERPL-MCNC: <0.01 G/DL
GFR SERPL CREATININE-BSD FRML MDRD: >90 ML/MIN/1.73M2
GLUCOSE BLDC GLUCOMTR-MCNC: 230 MG/DL (ref 70–99)
GLUCOSE BLDC GLUCOMTR-MCNC: 250 MG/DL (ref 70–99)
GLUCOSE BLDC GLUCOMTR-MCNC: 257 MG/DL (ref 70–99)
GLUCOSE BLDC GLUCOMTR-MCNC: 275 MG/DL (ref 70–99)
GLUCOSE BLDC GLUCOMTR-MCNC: 296 MG/DL (ref 70–99)
GLUCOSE SERPL-MCNC: 422 MG/DL (ref 70–99)
GLUCOSE UR STRIP-MCNC: >=1000 MG/DL
HBA1C MFR BLD: 10.9 %
HCT VFR BLD AUTO: 51.4 % (ref 40–53)
HGB BLD-MCNC: 17.7 G/DL (ref 13.3–17.7)
HGB UR QL STRIP: NEGATIVE
IMM GRANULOCYTES # BLD: 0 10E3/UL
IMM GRANULOCYTES NFR BLD: 0 %
KETONES UR STRIP-MCNC: NEGATIVE MG/DL
LEUKOCYTE ESTERASE UR QL STRIP: NEGATIVE
LYMPHOCYTES # BLD AUTO: 3.1 10E3/UL (ref 0.8–5.3)
LYMPHOCYTES NFR BLD AUTO: 32 %
MCH RBC QN AUTO: 29.8 PG (ref 26.5–33)
MCHC RBC AUTO-ENTMCNC: 34.4 G/DL (ref 31.5–36.5)
MCV RBC AUTO: 87 FL (ref 78–100)
MONOCYTES # BLD AUTO: 0.8 10E3/UL (ref 0–1.3)
MONOCYTES NFR BLD AUTO: 8 %
NEUTROPHILS # BLD AUTO: 5.5 10E3/UL (ref 1.6–8.3)
NEUTROPHILS NFR BLD AUTO: 55 %
NITRATE UR QL: NEGATIVE
NRBC # BLD AUTO: 0 10E3/UL
NRBC BLD AUTO-RTO: 0 /100
OPIATES UR QL SCN: NORMAL
PH UR STRIP: 7 [PH] (ref 5–7)
PLATELET # BLD AUTO: 233 10E3/UL (ref 150–450)
POTASSIUM SERPL-SCNC: 4.2 MMOL/L (ref 3.4–5.3)
PROT SERPL-MCNC: 8.7 G/DL (ref 6.4–8.3)
RBC # BLD AUTO: 5.94 10E6/UL (ref 4.4–5.9)
RBC URINE: 0 /HPF
SARS-COV-2 RNA RESP QL NAA+PROBE: NEGATIVE
SODIUM SERPL-SCNC: 135 MMOL/L (ref 136–145)
SP GR UR STRIP: 1.03 (ref 1–1.03)
SQUAMOUS EPITHELIAL: <1 /HPF
TSH SERPL DL<=0.005 MIU/L-ACNC: 2.53 UIU/ML (ref 0.3–4.2)
UROBILINOGEN UR STRIP-MCNC: NORMAL MG/DL
WBC # BLD AUTO: 9.9 10E3/UL (ref 4–11)
WBC URINE: <1 /HPF

## 2023-04-18 PROCEDURE — 84520 ASSAY OF UREA NITROGEN: CPT | Performed by: EMERGENCY MEDICINE

## 2023-04-18 PROCEDURE — 81001 URINALYSIS AUTO W/SCOPE: CPT | Performed by: EMERGENCY MEDICINE

## 2023-04-18 PROCEDURE — C9803 HOPD COVID-19 SPEC COLLECT: HCPCS

## 2023-04-18 PROCEDURE — 250N000012 HC RX MED GY IP 250 OP 636 PS 637: Performed by: EMERGENCY MEDICINE

## 2023-04-18 PROCEDURE — 96374 THER/PROPH/DIAG INJ IV PUSH: CPT

## 2023-04-18 PROCEDURE — 96361 HYDRATE IV INFUSION ADD-ON: CPT

## 2023-04-18 PROCEDURE — U0005 INFEC AGEN DETEC AMPLI PROBE: HCPCS | Performed by: EMERGENCY MEDICINE

## 2023-04-18 PROCEDURE — 99285 EMERGENCY DEPT VISIT HI MDM: CPT | Mod: 25

## 2023-04-18 PROCEDURE — 84443 ASSAY THYROID STIM HORMONE: CPT | Performed by: EMERGENCY MEDICINE

## 2023-04-18 PROCEDURE — 250N000013 HC RX MED GY IP 250 OP 250 PS 637: Performed by: EMERGENCY MEDICINE

## 2023-04-18 PROCEDURE — 36415 COLL VENOUS BLD VENIPUNCTURE: CPT | Performed by: EMERGENCY MEDICINE

## 2023-04-18 PROCEDURE — 82962 GLUCOSE BLOOD TEST: CPT

## 2023-04-18 PROCEDURE — 70450 CT HEAD/BRAIN W/O DYE: CPT

## 2023-04-18 PROCEDURE — 83036 HEMOGLOBIN GLYCOSYLATED A1C: CPT | Performed by: EMERGENCY MEDICINE

## 2023-04-18 PROCEDURE — 82077 ASSAY SPEC XCP UR&BREATH IA: CPT | Performed by: EMERGENCY MEDICINE

## 2023-04-18 PROCEDURE — 258N000003 HC RX IP 258 OP 636: Performed by: EMERGENCY MEDICINE

## 2023-04-18 PROCEDURE — 85004 AUTOMATED DIFF WBC COUNT: CPT | Performed by: EMERGENCY MEDICINE

## 2023-04-18 PROCEDURE — 80307 DRUG TEST PRSMV CHEM ANLYZR: CPT | Performed by: EMERGENCY MEDICINE

## 2023-04-18 PROCEDURE — 250N000011 HC RX IP 250 OP 636

## 2023-04-18 PROCEDURE — 90791 PSYCH DIAGNOSTIC EVALUATION: CPT

## 2023-04-18 RX ORDER — OLANZAPINE 10 MG/2ML
INJECTION, POWDER, FOR SOLUTION INTRAMUSCULAR
Status: COMPLETED
Start: 2023-04-18 | End: 2023-04-18

## 2023-04-18 RX ORDER — OLANZAPINE 10 MG/1
5 INJECTION, POWDER, LYOPHILIZED, FOR SOLUTION INTRAMUSCULAR ONCE
Status: COMPLETED | OUTPATIENT
Start: 2023-04-18 | End: 2023-04-18

## 2023-04-18 RX ORDER — OLANZAPINE 5 MG/1
10 TABLET, ORALLY DISINTEGRATING ORAL 2 TIMES DAILY PRN
Status: DISCONTINUED | OUTPATIENT
Start: 2023-04-18 | End: 2023-04-22 | Stop reason: HOSPADM

## 2023-04-18 RX ORDER — DEXTROSE MONOHYDRATE 25 G/50ML
25-50 INJECTION, SOLUTION INTRAVENOUS
Status: DISCONTINUED | OUTPATIENT
Start: 2023-04-18 | End: 2023-04-22 | Stop reason: HOSPADM

## 2023-04-18 RX ORDER — NICOTINE POLACRILEX 4 MG
15-30 LOZENGE BUCCAL
Status: DISCONTINUED | OUTPATIENT
Start: 2023-04-18 | End: 2023-04-22 | Stop reason: HOSPADM

## 2023-04-18 RX ORDER — METFORMIN HCL 500 MG
500 TABLET, EXTENDED RELEASE 24 HR ORAL
Status: DISCONTINUED | OUTPATIENT
Start: 2023-04-18 | End: 2023-04-21

## 2023-04-18 RX ADMIN — METFORMIN HYDROCHLORIDE 500 MG: 500 TABLET, EXTENDED RELEASE ORAL at 17:16

## 2023-04-18 RX ADMIN — SODIUM CHLORIDE 1000 ML: 9 INJECTION, SOLUTION INTRAVENOUS at 02:38

## 2023-04-18 RX ADMIN — SODIUM CHLORIDE 1000 ML: 9 INJECTION, SOLUTION INTRAVENOUS at 05:21

## 2023-04-18 RX ADMIN — OLANZAPINE 5 MG: 10 INJECTION, POWDER, FOR SOLUTION INTRAMUSCULAR at 02:39

## 2023-04-18 RX ADMIN — INSULIN ASPART 3 UNITS: 100 INJECTION, SOLUTION INTRAVENOUS; SUBCUTANEOUS at 17:20

## 2023-04-18 RX ADMIN — OLANZAPINE 10 MG: 5 TABLET, ORALLY DISINTEGRATING ORAL at 11:06

## 2023-04-18 RX ADMIN — OLANZAPINE 5 MG: 10 INJECTION, POWDER, LYOPHILIZED, FOR SOLUTION INTRAMUSCULAR at 02:39

## 2023-04-18 RX ADMIN — INSULIN ASPART 2 UNITS: 100 INJECTION, SOLUTION INTRAVENOUS; SUBCUTANEOUS at 13:48

## 2023-04-18 ASSESSMENT — ACTIVITIES OF DAILY LIVING (ADL)
ADLS_ACUITY_SCORE: 35

## 2023-04-18 ASSESSMENT — COLUMBIA-SUICIDE SEVERITY RATING SCALE - C-SSRS
6. HAVE YOU EVER DONE ANYTHING, STARTED TO DO ANYTHING, OR PREPARED TO DO ANYTHING TO END YOUR LIFE?: NO
2. HAVE YOU ACTUALLY HAD ANY THOUGHTS OF KILLING YOURSELF?: NO
TOTAL  NUMBER OF INTERRUPTED ATTEMPTS LIFETIME: NO
TOTAL  NUMBER OF ABORTED OR SELF INTERRUPTED ATTEMPTS LIFETIME: NO
1. HAVE YOU WISHED YOU WERE DEAD OR WISHED YOU COULD GO TO SLEEP AND NOT WAKE UP?: NO
ATTEMPT LIFETIME: NO

## 2023-04-18 NOTE — ED NOTES
Report received, assumed care of patient.  Patient resting in bed, denies complaints at this time. Patient calm and cooperative, ambulated to restroom.
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
## 2023-04-18 NOTE — PHARMACY-ADMISSION MEDICATION HISTORY
Pharmacist Admission Medication History    Admission medication history is complete. The information provided in this note is only as accurate as the sources available at the time of the update.    Medication reconciliation/reorder completed by provider prior to medication history? No    Information Source(s): Patient via in-person    Pertinent Information: Pt states he has not taken metformin for a few months - states he was taking 2 tablets twice daily and would need a refill in order to continue taking.    Changes made to PTA medication list:    Added: None    Deleted: Flonase, methocarbamol    Changed: None       Allergies reviewed with patient and updates made in EHR: yes    Medication History Completed By: Carolyn Haas [facility] 4/18/2023 9:36 AM    Prior to Admission medications    Medication Sig Last Dose Taking? Auth Provider Long Term End Date   metFORMIN (GLUCOPHAGE XR) 500 MG 24 hr tablet On Week 1- Start taking 1 tablet by mouth daily , week 2- increase to 2 tablets daily, week 3 - increase to 3 tablets daily, week 4- increase to 4 tablets daily. Strength: 500 mg More than a month at was taking 2 tabs BID Yes Teri Jorge, PAStewartC Yes    Continuous Blood Gluc  (FREESTYLE FRANKI 2 READER) DAPHNE Use to monitor blood glucose levels   Reported, Patient     Continuous Blood Gluc Sensor (FREESTYLE FRANKI 2 SENSOR) MISC Use 1 sensor every 14 days   Reported, Patient

## 2023-04-18 NOTE — ED NOTES
Patient resting in bed, Blood sugar checked and treated, patient eating left overs from lunch tray, denies complaints at this time.

## 2023-04-18 NOTE — ED PROVIDER NOTES
History   Chief Complaint:  Paranoid     HPI   Alexander Mccoy is a 38 year old male with a history of Type II diabetes who presents with paranoia, feels like he's being followed.  Patient notes that he is being followed by undercover governmental agents.  He also speaks of Gnosticism undertones that are behind the people following him and tracing him.  He does note that he has a history of diabetes and takes metformin although not regularly.  He is not concerned about his blood sugar being greater than 300 today.  He denies fever, sore throat, cough, chest pain, shortness of breath, abdominal pain, nausea, vomiting.  Patient notes that he went to the police station [facility] because he felt that these undercover governmental agents were still tracing him this evening.  He ultimately got into an argument with the  after the  did not believe him that he was being followed by an .  He did get an abrasion to the right knee during this.  He was placed on a health officer hold and transferred to the hospital at that time by EMS.    Independent Historian:   EMS - They report paranoia, gradiose delusions, concern for mental health crisis.  Patient hyperglycemic for EMS.     Review of External Notes: Reviewed prior urgent care visit from 10/16/2022.  History of diabetes.  History of noncompliance with medications.    ROS:  Review of Systems    Allergies:  No Known Allergies     Medications:    Continuous Blood Gluc  (FREESTYLE FRANKI 2 READER) DAPHNE  Continuous Blood Gluc Sensor (FREESTYLE FRANKI 2 SENSOR) MISC  fluticasone (FLONASE) 50 MCG/ACT nasal spray  metFORMIN (GLUCOPHAGE XR) 500 MG 24 hr tablet  methocarbamol (ROBAXIN) 750 MG tablet      Past Medical History:    Past Medical History:   Diagnosis Date     Asthma      Past Surgical History:    Past Surgical History:   Procedure Laterality Date     NO HISTORY OF SURGERY        Family History:    family history is not on  "file.    Social History:   reports that he has never smoked. He has never used smokeless tobacco. He reports that he does not drink alcohol and does not use drugs.  PCP: No Ref-Primary, Physician     Physical Exam     Patient Vitals for the past 24 hrs:   BP Temp Temp src Pulse Resp SpO2 Height Weight   04/18/23 0226 -- -- -- -- -- -- 1.651 m (5' 5\") 95.3 kg (210 lb)   04/18/23 0222 (!) 142/93 98.1  F (36.7  C) Oral 103 20 96 % -- --      Physical Exam  General: Alert, appears well-developed and well-nourished. Cooperative.     In mild distress, paranoid behaviors.  Poor eye contact.   HEENT:  Head:  Atraumatic  Ears:  External ears are normal  Mouth/Throat:  Oropharynx is without erythema or exudate and mucous membranes are moist.   Eyes:   Conjunctivae normal and EOM are normal. No scleral icterus.  CV:  Tachycardic rate, regular rhythm, normal heart sounds and radial pulses are 2+ and symmetric.  No murmur.  Resp:  Breath sounds are clear bilaterally    Non-labored, no retractions or accessory muscle use  GI:  Abdomen is soft, no distension, no tenderness. No rebound or guarding.  No CVA tenderness bilaterally  MS:  Normal range of motion. No edema.    Normal strength in all 4 extremities.     Back atraumatic.    No midline cervical, thoracic, or lumbar tenderness  Skin:  Warm and dry.  Abrasion to right knee.  Neuro: Alert. Normal strength.  GCS: 15  Psych:  Paranoid.  Poor eye contact.  States undercover governmental agents are following him.  Denies SI/HI.  Does not endorse active hallucinations.    Emergency Department Course     Imaging:  CT Head w/o Contrast   Final Result   IMPRESSION:   1.  No acute intracranial process.      Report per radiology    Laboratory:  Labs Ordered and Resulted from Time of ED Arrival to Time of ED Departure   COMPREHENSIVE METABOLIC PANEL - Abnormal       Result Value    Sodium 135 (*)     Potassium 4.2      Chloride 95 (*)     Carbon Dioxide (CO2) 26      Anion Gap 14      " Urea Nitrogen 14.7      Creatinine 0.72      Calcium 10.0      Glucose 422 (*)     Alkaline Phosphatase 117      AST 57 (*)     ALT 79 (*)     Protein Total 8.7 (*)     Albumin 4.8      Bilirubin Total 0.3      GFR Estimate >90     ROUTINE UA WITH MICROSCOPIC REFLEX TO CULTURE - Abnormal    Color Urine Straw      Appearance Urine Clear      Glucose Urine >=1000 (*)     Bilirubin Urine Negative      Ketones Urine Negative      Specific Gravity Urine 1.030      Blood Urine Negative      pH Urine 7.0      Protein Albumin Urine Negative      Urobilinogen Urine Normal      Nitrite Urine Negative      Leukocyte Esterase Urine Negative      RBC Urine 0      WBC Urine <1      Squamous Epithelials Urine <1     CBC WITH PLATELETS AND DIFFERENTIAL - Abnormal    WBC Count 9.9      RBC Count 5.94 (*)     Hemoglobin 17.7      Hematocrit 51.4      MCV 87      MCH 29.8      MCHC 34.4      RDW 11.8      Platelet Count 233      % Neutrophils 55      % Lymphocytes 32      % Monocytes 8      % Eosinophils 4      % Basophils 1      % Immature Granulocytes 0      NRBCs per 100 WBC 0      Absolute Neutrophils 5.5      Absolute Lymphocytes 3.1      Absolute Monocytes 0.8      Absolute Eosinophils 0.4      Absolute Basophils 0.1      Absolute Immature Granulocytes 0.0      Absolute NRBCs 0.0     HEMOGLOBIN A1C - Abnormal    Hemoglobin A1C 10.9 (*)    ETHYL ALCOHOL LEVEL - Normal    Alcohol ethyl <0.01     TSH WITH FREE T4 REFLEX - Normal    TSH 2.53     COVID-19 VIRUS (CORONAVIRUS) BY PCR - Normal    SARS CoV2 PCR Negative     DRUG ABUSE SCREEN 1 URINE (ED) - Normal    Amphetamines Urine Screen Negative      Barbituates Urine Screen Negative      Benzodiazepine Urine Screen Negative      Cannabinoids Urine Screen Negative      Cocaine Urine Screen Negative      Opiates Urine Screen Negative     GLUCOSE MONITOR NURSING POCT        Procedures     Emergency Department Course & Assessments:     Interventions:  Medications   OLANZapine zydis  (zyPREXA) ODT tab 10 mg (has no administration in time range)   0.9% sodium chloride BOLUS (has no administration in time range)   0.9% sodium chloride BOLUS (1,000 mLs Intravenous $New Bag 4/18/23 0238)   OLANZapine (zyPREXA) IV injection 5 mg (5 mg Intravenous $Given 4/18/23 0239)      Assessments:  0220 Assessed patient in hallway bed.  0227 Code 21 called on patient, patient redirected back to the hallway bed and compliant with cares and medications.     Independent Interpretation (X-rays, CTs, rhythm strip):  Independently reviewed CT of the head with no acute intracranial hemorrhage    Consultations/Discussion of Management or Tests:  None        Social Determinants of Health affecting care:   Healthcare Access/Compliance    Disposition:  Care of the patient was transferred to my colleague Dr. Harris pending mental health evaluation with DEC.     Impression & Plan    CMS Diagnoses: None    Medical Decision Making:  Patient is a 38-year-old male with a history of diabetes who presents with paranoia and psychosis.  Patient also has hyperglycemia likely secondary to medication noncompliance with his history of diabetes.  Patient is supposed to be taking metformin but tells me that he does not take these medications as he does not feel that he needs them.  Ultimately A1c is elevated at 10.9.  Elevated glucose has improved with IV rehydration.  Broad laboratory work-up was pursued given the patient's paranoia and thankfully no evidence of renal dysfunction, thyroid dysfunction, or sinister intracranial pathology.  Urine drug screen is negative.  Urinalysis consistent with uncontrolled diabetes with significant glucosuria.  Patient will need a formal mental health evaluation with our DEC assessment team.  He does remain on a health officer hold at this time.  I have high suspicion patient may warrant inpatient mental health admission given severe paranoia/psychosis.  Patient did initially receive a dose of IV  Zyprexa given mild agitation on arrival in the emergency department.  The patient does not like to be told that he is getting paranoid and he did initially have a code 21 activated.  He was able to be verbally de-escalated and did ultimately allow IV placement and IV medications.  He did not require any restraint while under my care in the emergency department.  Patient is remained vitally stable under my care.  Care signed out to my partner Dr. Harris pending mental health evaluation.    Diagnosis:    ICD-10-CM    1. Hyperglycemia  R73.9       2. Paranoia (H)  F22       3. Psychosis, unspecified psychosis type (H)  F29          Discharge Medications:  New Prescriptions    No medications on file      4/18/2023   Simone Mace MD White, Scott, MD  04/18/23 0516

## 2023-04-18 NOTE — ED NOTES
"Pt made aware of 72hr hold and writer gave pt his rights during the hold, pt began to escalate emotionally, stating \"I did not try to hurt anybody, I didn't do anything, I am not mentally ill, I am going to [place] this place, it is illegal to keep me here.\" Prior to this conversation pt was explain delusions about the government \"messing with him\" and numbers that keep showing up on social media that aren't \"just a coincidence.\" Pt mentioned the government using \"scare tactics\" on him and that he hasn't used any drugs for years.   "

## 2023-04-18 NOTE — ED TRIAGE NOTES
Pt BIBA for evaluation of paranoia. Pt drove to [place] [employer] and requesting them to stop following him. Pt states as though the police have been following him everywhere he goes. Known hx of DM on metformin. Pt noncompliant with meds

## 2023-04-18 NOTE — ED NOTES
IP MH Referral Acuity Rating Score (RARS)    LMHP complete at referral to IP MH, with DEC; and, daily while awaiting IP MH placement. Call score to PPS.    CRITERIA SCORING Total    New 72 HH and Involuntary for IP MH (not adolescent) 1/1   Boarding over 24 hours 0/1   Vulnerable adult at least 55+ with multiple co morbidities; or, Patient age 11 or under 0/1   Suicide ideation without relief of precipitating factors 0/1   Current plan for suicide 0/1   Current plan for homicide 0/1   Imminent risk or actual attempt to seriously harm another without relief of factors precipitating the attempt 0/1   Severe dysfunction in daily living (ex: complete neglect for self care, extreme disruption in vegetative function, extreme deterioration in social interactions) 1/1   Recent (last 2 weeks) or current physical aggression in the ED 0/1   Restraints or seclusion episode in ED 0/1   Verbal aggression, agitation, yelling, etc., while in the ED 0/1   Active psychosis with psychomotor agitation or catatonia 1/1   Need for constant or near constant redirection (from leaving, from others, etc).  0/1   Intrusive or disruptive behaviors 0/1   TOTAL Acuity Total Score: 3

## 2023-04-18 NOTE — TELEPHONE ENCOUNTER
S: [facility] ED , DEC  Nimo calling at 10:25A about a 38 year old/Male presenting with psychosis and possible delusions, for the past 2-3 years pt believed that they were being followed by government spies and gangs. Pt often reference Bibical numbers. Pt was scared last night that they went to the police, police did not believe pt, and there was a physical altercation with pt and police as a result.    B: Pt arrived via EMS. Presenting problem, stressors: unknown. Pt does not work, due to infiltration of gangs. Pt reported that they dated a girl a while back and girlfriend was affiliated with ganjigl. Pt also believes that pt's brother is affiliated with the ganjigl as well.     Pt affect in ED: Anxious  and Tangential  Pt Dx: Psychotic Spectrum Disorder, Anxiety, Substance abuse hx  Previous IPMH hx? No    Pt denies SI   Hx of suicide attempt? No  Pt denies SIB  Pt denies HI   Pt denies hallucinations - not reported by pt at this time.   Pt RARS Score: 3.    Hx of aggression/violence, sexual offenses, legal concerns, Epic care plan? describe: Pt did have altercation with police recently due to them not believing pt.   Current concerns for aggression this visit? No  Does pt have a history of Civil Commitment? No  Is Pt their own guardian? Yes    Pt is not prescribed medication. Is patient medication compliant? N/A  Pt denies OP services   CD concerns: Pt reported using unspecified substance in their past  Acute or chronic medical concerns: Type 2 Diabetes  Does Pt present with specific needs, assistive devices, or exclusionary criteria? None      Pt is ambulatory  Pt is able to perform ADLs independently      A: Pt to be reviewed for [facility] admission. Pt is on a 72HH, initiated 04/18/23 10:20 AM  Preferred placement: Statewide    COVID:Negative  Utox: Negative   CMP: Abnormalities: Sodium 135, CHLORIDE 95, GLUCOSE 422, AST 57, ALT 79, PROTEIN TOTAL 8.7  CBC: Abnormalities: RBC COUNT 5.94  HCG: N/A    R: Patient  cleared and ready for behavioral bed placement: Yes  Pt placed on IPMH worklist? Yes

## 2023-04-19 ENCOUNTER — TELEPHONE (OUTPATIENT)
Dept: BEHAVIORAL HEALTH | Facility: CLINIC | Age: 38
End: 2023-04-19
Payer: COMMERCIAL

## 2023-04-19 ENCOUNTER — MEDICAL CORRESPONDENCE (OUTPATIENT)
Dept: HEALTH INFORMATION MANAGEMENT | Facility: CLINIC | Age: 38
End: 2023-04-19

## 2023-04-19 LAB
GLUCOSE BLDC GLUCOMTR-MCNC: 226 MG/DL (ref 70–99)
GLUCOSE BLDC GLUCOMTR-MCNC: 261 MG/DL (ref 70–99)
GLUCOSE BLDC GLUCOMTR-MCNC: 284 MG/DL (ref 70–99)
GLUCOSE BLDC GLUCOMTR-MCNC: 293 MG/DL (ref 70–99)

## 2023-04-19 PROCEDURE — 82962 GLUCOSE BLOOD TEST: CPT

## 2023-04-19 PROCEDURE — 250N000013 HC RX MED GY IP 250 OP 250 PS 637: Performed by: EMERGENCY MEDICINE

## 2023-04-19 RX ADMIN — METFORMIN HYDROCHLORIDE 500 MG: 500 TABLET, EXTENDED RELEASE ORAL at 18:48

## 2023-04-19 RX ADMIN — INSULIN ASPART 3 UNITS: 100 INJECTION, SOLUTION INTRAVENOUS; SUBCUTANEOUS at 18:24

## 2023-04-19 RX ADMIN — INSULIN ASPART 4 UNITS: 100 INJECTION, SOLUTION INTRAVENOUS; SUBCUTANEOUS at 14:00

## 2023-04-19 RX ADMIN — INSULIN ASPART 2 UNITS: 100 INJECTION, SOLUTION INTRAVENOUS; SUBCUTANEOUS at 09:20

## 2023-04-19 ASSESSMENT — ACTIVITIES OF DAILY LIVING (ADL)
ADLS_ACUITY_SCORE: 35

## 2023-04-19 NOTE — ED NOTES
VSS. Meal tray delivered. BS: 226. Insulin given as ordered. Patient tolerating meal tray. 1:1 sitter at bedside.

## 2023-04-19 NOTE — TELEPHONE ENCOUNTER
[facility] Access Inpatient Bed Call Log 4/19/23 @ 1:30AM     Intake has called facilities that have not updated their bed status within the last 12 hours.       [facility] is posting 0 beds.     [facility] is posting 0 beds. [phone]     [facility] is posting 0 beds. [phone]    [facility] is posting 0 beds. [phone]. Tessie @ 01:38 reports they do not have beds available [facility] is posting 0 beds. [phone]    [facility] is posting 0 bed. [phone]     [facility] is posting 0 beds. [phone]    [facility] is posting 0 beds. [phone]    [facility] is posting 0 beds. [phone]    [facility] is posting 0 beds. [phone]          [facility] is posting 0 beds. Mixed unit 12+. Low acuity only. [phone]    [facility] is posting 0 beds. No aggression.  [phone]    [facility] is posting 0 beds. [phone]     [facility] is posting 0 beds [phone]    [facility] is posting 2 beds. [phone]. Mirian @ 01:39 reports they are at capacity    [facility] is posting 0 beds. Low acuity. [phone]    [facility] is posting 0 beds. 72 hr hold preferred. [phone]    [facility] is posting 6 beds.[phone]. Kenneth @ 01:49 reports they have low acuity beds available in [place] - pt not appropriate for current beds avail    [facility] is posting 3 beds. Voluntary only- no holds/commitments, No Hx of aggression, violence, or assault. No sexual offenders. No 72 hr holds. [phone]. Meets exclusionary criteria d/t 72 HH    [facility] is posting 5 beds. (Must have the cognitive ability to do programming. No aggressive or violent behavior or recent HX in the last 2 yrs. MH must be primary.) [phone]. Pt not appropriate      [facility] is posting 0 beds. Low acuity only. Violence and aggression capped. [phone]     [facility] is posting 1 bed. Low acuity, Neg Covid. [phone]. Jason @ 01:41 reports their CRN is out of the of the office but will pass message to her to call Intake back if they have beds and are able to review   [facility] is posting 1 Adol/adult bed bed. Covid neg. Vol only. Combined adolescent and adult unit. No aggressive or violent behavior. No registered sex offenders. [phone]. Meets exclusionary criteria d/t 72 [facility] [place] posting 0 beds. Negative covid.     [facility] is posting 10 beds. Call for details. Facility is in [place] of state. [phone]. Pt is on a [place] 72 HH [facility] is posting 0 beds. [phone]      Pt remains on work list pending appropriate bed availability.

## 2023-04-19 NOTE — PROGRESS NOTES
Mental Health Transition and Triage-Extended Care  Civil Commitment Status Plan of Care    Current commitment status is: under 72 Hour Hold expiring 4/21/23    72hr-Hold Started: 4/18/23 at 1019   72hr-Hold Conclude: 4/21/23 at 1019    [facility]:  [facility]    Type of Commitment Requested: Mentally Ill   [place] Petition Needed: Yes    -Miner documentation completed: Requested/Pending  [place] Petition Needed: No   -[place] documentation completed: N/A    Pre-petition screening paperwork has been sent to [facility] Intake at [facility] via Encrypted E-mail at 1024 and includes:  PREPETITIONFORMS: Commitment Petition, Examiner's Statement, Exhibit A    Supplemental documentation has also been send to the [facility] including: Patient Facesheet, Patient hospital encounter notes, Patient 72 hour hold order, Patient MAR and Patient Labs    Pre-Petitions forms have been sent to Intake and Stat to be scanned to the EMR.  Yes    Verbal report given to [facility] intake team via phone [phone] at 1022     Next steps:   -Continue with 72 hour hold  -Continue with inpatient hospitalization recommendation  -Extended Care to continue to follow for support and disposition.  --Following for [place] needs      YAMILA SIN, LICSW, Psychotherapist  Extended Care- Triage & Transition Services  Callback: [phone]

## 2023-04-19 NOTE — PROGRESS NOTES
"Triage & Transition Services, Extended Care     Alexander Mccoy  April 19, 2023    Alexander is followed related to Long wait time for admission: 32+ hours in ED. Please see initial DEC Crisis Assessment completed for complete assessment information. Medical record is reviewed. While patient is in the ED, care team is working towards Learn and Demonstrate at [employer] Focused on Crisis Stabilization.    Met with Patient virtually. He was pleasant during interaction. Continues to present has delusional and paranoid. Believes the government is following him and rattles off serveral times he was pulled over by the police as examples. Reports that the police do not believe his claims and should be \"investigating them\" but \"it wouldn't do any good as they would like anyway\". He reports he is not paranoid and he is actually being followed by the government and should not be in the hospital. He became paranoid of writer when writer asked if he felt safe in the hospital stating \"why do you want to know. Why are you asking me that\". He did answer yes, he feels safe in the hospital. Provided education on 72Hh and commitment process. Patient appeared to understand.     Plan:  Inpatient Mental Health: Patient is not agreeable to inpatient hospitalization thus is on a 72hh that expires on 4/21 at 1019. Petition for commitment has been submitted to [facility]. Patient is exhibiting symptoms of paranoia and delusions.     Plan for Care reviewed with Assigned Medical Provider? Yes. Provider, Dr. Scruggs, response: agreeable.    Extended Care will follow and meet with patient/family/care team as able or requested.     Corinne Romitti, [facility], Extended Care   [phone]          "

## 2023-04-19 NOTE — CONSULTS
[facility] COURT- PROBATE/MENTAL HEALTH DIVISION  [facility] ([place]    _________________________________________________________________________  PETITION FOR JUDICIAL COMMITMENT    In the Matter of the Civil Commitment of:   Alexander RAMOS Nadine  1985    D.C. File No. [id number]   C.A. File No. _____________     YAMILA SIN, [facility] of [facility] is interested in the respondent as a Licensed Mental Health Professional, and to the best of his/her knowledge, information, and belief, petitioner respectfully represents:     1. Respondent was born on 1985    2. Respondent lives at [address] [place] [address]  and has residence in [place], [place] for the purpose of judicial commitment;     3. Respondent s spouse and nearest kaley are:    (Rel.) Home Phone Work Phone Mobile Phone   TEO GARDINER (Father) [phone] -- --     4. The observations of respondent s behavior showing that respondent is Mentally Ill  and there is no suitable alternative to involuntary commitment are set forth in Exhibit A and the examiner s statement attached.     5. An examiner s statement supporting respondent s commitment is attached.     WHEREFORE, Petitioner prays the Court commit the respondent to the Commissioner of Human Services, or other treatment facility according to law.     I declare under penalty of perjury under the laws of the [facility] [place] that the foregoing is true and correct.     Executed on April 19, 2023 in the [facility]  in the [facility].         Signature: ________________________________           Examiner s name: IKE SELLERS MSW, LICSW          [facility] EMERGENCY DEPT  [address]  [facility] [id number]  [phone]  [phone]

## 2023-04-19 NOTE — ED NOTES
IP MH Referral Acuity Rating Score (RARS)    LMHP complete at referral to IP MH, with DEC; and, daily while awaiting IP MH placement. Call score to PPS.    CRITERIA SCORING Total    New 72 HH and Involuntary for IP MH (not adolescent) 1/1   Boarding over 24 hours 1/1   Vulnerable adult at least 55+ with multiple co morbidities; or, Patient age 11 or under 0/1   Suicide ideation without relief of precipitating factors 0/1   Current plan for suicide 0/1   Current plan for homicide 0/1   Imminent risk or actual attempt to seriously harm another without relief of factors precipitating the attempt 0/1   Severe dysfunction in daily living (ex: complete neglect for self care, extreme disruption in vegetative function, extreme deterioration in social interactions) 1/1   Recent (last 2 weeks) or current physical aggression in the ED 0/1   Restraints or seclusion episode in ED 0/1   Verbal aggression, agitation, yelling, etc., while in the ED 0/1   Active psychosis with psychomotor agitation or catatonia 1/1   Need for constant or near constant redirection (from leaving, from others, etc).  0/1   Intrusive or disruptive behaviors 0/1   TOTAL Acuity Total Score: 4

## 2023-04-19 NOTE — CONSULTS
"[facility] COURT- PROBATE/MENTAL HEALTH DIVISION  FOURTH ([place])    _________________________________________________________________________________________________________________________________________________________     In the Matter of the Civil Commitment of: Alexander Mccoy      EXAMINERS STATEMENT IN SUPPORT OF       PETITION FOR JUDICIAL COMMITMENT,       Respondent File No._________________________     I am a licensed physician, licensed psychologist, licensed mental health professional, licensed physician assistant or advanced practice registered nurse certified in mental health who is knowledgeable, trained and practicing in the diagnosis or assessment or in the treatment of the alleged impairment listed below:        X Mentally Ill       I have examined the above-named person within the last fifteen days, on  April 19, 2023 and the results of the examination are stated below:        Behavioral evidence to support commitment:      Mr. Mccoy demonstrates symptoms of psychosis including paranoid delusions that the government is following him and mocking him with the numbers 127, 217, 316, 555, 666 and the letters MJTZ. He is religiously preoccupied with these numbers and letters stating they are Bible verses or references to names in the Bible. He reports visual hallucinations of seeing people with guns driving by his home. He has diabetic care that he reports taking his medications but also presented to the ED with a blood glucose of over 300 as \"I drank a gallon of lemonade because I was thirsty\".  He does not want to be in the hospital and wants an investigation done on the  that is following him.  He does not want [facility] involved.     Diagnostic Impression and Conclusion:      300.00 (F41.9) Unspecified Anxiety Disorder   298.8  (F28) Other spec. Schizophrenia Spectrum  - provisional       Recommendations:      Inpatient " hospitalization is recommended due to symptoms presentation, lack of insight and dangerousness to self and others.     _________________________________________________________________________  Will this person need neuroleptic medications?  Yes       Does this person have sufficient awareness of their situation and understanding of treatment with neuroleptics to make this decision for themselves? No    **If you answered No, then you must provide either a [place] petition or a Request for a Substitute Decision Maker form.      I am of the opinion that the above-named person is in need of treatment and should be committed to a treatment facility for Mentally Ill     Patient is currently RefusingNeuroleptic Medications.    (If mental illness is diagnosed) treatment with neuroleptic medication is:  Recommended    The above-named person does not have capacity to make decisions regarding such treatment.    Reasons for this opinion:    Demonstrates limited insight into safety for self and others related to his symptoms of psychosis.      Dated:   April 19, 2023        Signature: ________________________________           Examiner s name: IKE DRISCOLL, INGRID    [facility] EMERGENCY DEPT  [address] [facility] [address]  [phone]  [phone]

## 2023-04-19 NOTE — CONSULTS
"[facility] COURT- PROBATE/MENTAL HEALTH DIVISION  FOURTH ([place])     COURT FILE NO._____________________   ________________________________________________________________________      In the Matter of the Civil Commitment of: Alexander Mccoy                              EXHIBIT A  The following observations of the patient s behavior provide a factual basis for believing the patient is:  Mentally Ill  and is in need of hospitalization.     was initially seen for a mental health evaluation on 4/18/23 by Nimo QUINTERO.    \"Patient approached police last night reporting concerns and fears of being followed by government agents.  There are Buddhism factors for being stalked as patient alludes.   Noted that patient did not feel believed by police about being followed.   Note said \"patient got a knee abrasion\" as result.  The patient was upset that police did not believe him and there was, as a result, an unspecified \"scuffle\" occurred with police resulted \"in a knee abrasion.\"       Patient is quiet paranoid.  He is trying to figure out the who and why of this situation.  He is highly focused on finding \"the truth\" of the who and why.  He believes the government agents have involved others to help spy on him.   He says his brother mocks him now and will make subtle references to biblical passages as form of mocking.      Patient wants to leave the ED.   He says he kind of wants his mother to know he is in ED and safe.  She is at work.   His father is his emergency contact and that number is disconnected.    Patient is very focused on his delusion so this section is somewhat limited.   Patient says he no longer works since the government agents infiltrated his job probably though phone and employer computer.  He says he had a girlfriend who had gang connections a year or so back and the two \"did some drugs, but no longer.\"  He wonders then if the gang " "members are in on the conspiracy.      Patient is elusive when asked about his place of residence-- he wants to focus on figuring out who is behind the conspiracy against him.  Patient is hyperfocused on this delusion.      Patient has Type II diabetes.   He is not compliant with metformin Rx.         Writer notes patient says his ex girlfriend was affiliated with a gang when they are  \"    Today he continues with delusions and limited insight into his mental health concerns as safety concerns associated with his symptoms and erratic behavior. He is not agreeable to medication or mental health services inpatient or outpatient.    Person completing Exhibit A: IKE DRISCOLL, LICSW    Title: Licensed Mental Health Professional     Signature:_______________________________     Date: April 19, 2023   [facility] EMERGENCY DEPT  [address] [place] [address]  [phone]  [phone]     "

## 2023-04-19 NOTE — TELEPHONE ENCOUNTER
Inpatient Bed Call Log 4.18.23 8:00 pm    Adults:    [facility] is posting 0 bed.      [facility] is posting 0 beds.     [facility] is posting 0 beds.     [facility] is posting 0 beds.     [facility] is posting 0 beds.     [facility] is posting 0 beds.     [facility] is posting 0 beds.     [facility] is posting 0 beds.        [facility] is posting 0 bed. Mixed unit 12+. Low acuity only.      [facility] is positing 0 beds. No aggression.      [facility] is posting 0 beds.      [facility] is posting 0 bed. Low acuity only.     [facility] is posting 0 bed.     [facility] is posting 0 bed. Low acuity.      [facility] is posting 0 bed. 72 hr hold required. Low acuity. Neg COVID.     [facility] is posting 7 beds.  Low acuity only.    [facility] is posting 3 beds. Vol only, No Hx of aggression, violence, or assault. No sexual offenders. No 72 hr holds.     [facility] is posting 5 beds. (Must have the cognitive ability to do programming. No aggressive or violent behavior or recent HX in the last 2 yrs. MH must be primary.)     [facility] is posting 0 beds. Low acuity only. Violence and aggression capped.      [facility] is posting 1 bed. Low acuity, Neg Covid.      [facility] is posting 1 bed. Covid neg. Vol only. Combined adolescent and adult unit. No aggressive or violent behavior. No registered sex offenders.      [facility] is posting 10 beds. Call for details.     [facility] is posting 0 beds.     The pt remains on the waitlist. Intake continues to identify appropriate bed placement.

## 2023-04-19 NOTE — ED NOTES
"Pt awake. Pt reports he cannot sleep. Pt starting to ask why someone put him here. Pt getting worked up and agitated. Pt says \"I am not paranoid, just because I told them about the government workers who were following me doesn't make me paranoid. Am I supposed to pretend I don't know they are following me!\". Pt getting louder and louder and more agitated. Pt offered PRN zyprexa. Pt refused. Pt's lights turned down and left alone to calm himself. Pt quieting down. 1:1 instructed to not engage with patient unless necessary d/t patient needing reduced stimulation.   "

## 2023-04-19 NOTE — TELEPHONE ENCOUNTER
R:  10:52 AM Pt's Petition for commitment court documentation in Right Fax and support documentation @10:53 AM in Right Fax.  Onbased into Pt's chart @12:32 PM     1:56 PM Discussed Pt with Beth with [facility].  Pt still being reviewed.    3:34 PM Beth with [facility] left a message that Pt was declined due to Pts acuity and that he requires a higher level of care that [facility] doesn't have available today.    No appropriate [facility] beds available.    [facility] Access Inpatient Bed Call Log 4/19/2023 @2:30 PM        Intake has called facilities that have not updated their bed status within the last 12 hours.     Adults:         [facility] is posting 0 beds.      [facility] is posting 0 beds. [phone]      [facility] is posting 0 beds. [phone]     [facility] is posting 0 beds. [phone]      [facility] is posting 0 beds. [phone]     [facility] is posting 0 bed. [phone]      [facility] is posting 0 beds. [phone]     [facility] is posting 0 beds. [phone]     [facility] is posting 0 beds. [phone]     [facility] is posting 0 beds. [phone]          [facility] is posting 0 beds. Mixed unit 12+. Low acuity only.  [phone]     [facility] is posting 0 beds. No aggression.  [phone]     [facility] is posting 0 beds. [phone]      [facility] is posting 0 beds. [phone]      [facility] is posting 2 beds. [phone]      [facility] is posting 0 beds. Low acuity. [phone]     [facility] is posting 1 beds. 72 hr hold preferred. [phone]      [facility] is posting 6 beds.  [phone]           [facility] is posting 3 bed. Voluntary only- no holds/commitments, No Hx of aggression, violence, or assault. No sexual offenders. No 72 hr holds. [phone]     [facility] is posting 4 beds. (Must have the cognitive  ability to do programming. No aggressive or violent behavior or recent HX in the last 2 yrs. MH must be primary.) [phone]      [facility] is posting 0 beds. Low acuity only. Violence and aggression capped. [phone]      [facility] is posting 0 beds. Low acuity, Neg Covid. [phone] May have availability later in the day.     [facility] is posting 1 beds. Covid neg. Vol only. Combined adolescent and adult unit. No aggressive or violent behavior. No registered sex offenders. [phone] May have availability later in the day.     [facility] posting 0 beds. Negative covid.      [facility] is posting 4 beds. Call for details. [phone]      [facility] is posting 0 beds. [phone]      Pt remains on work list pending appropriate bed availability.

## 2023-04-20 ENCOUNTER — TELEPHONE (OUTPATIENT)
Dept: BEHAVIORAL HEALTH | Facility: CLINIC | Age: 38
End: 2023-04-20
Payer: COMMERCIAL

## 2023-04-20 ENCOUNTER — MEDICAL CORRESPONDENCE (OUTPATIENT)
Dept: HEALTH INFORMATION MANAGEMENT | Facility: CLINIC | Age: 38
End: 2023-04-20

## 2023-04-20 LAB — GLUCOSE BLDC GLUCOMTR-MCNC: 232 MG/DL (ref 70–99)

## 2023-04-20 PROCEDURE — 82962 GLUCOSE BLOOD TEST: CPT

## 2023-04-20 PROCEDURE — 250N000013 HC RX MED GY IP 250 OP 250 PS 637

## 2023-04-20 PROCEDURE — 250N000013 HC RX MED GY IP 250 OP 250 PS 637: Performed by: EMERGENCY MEDICINE

## 2023-04-20 PROCEDURE — 99253 IP/OBS CNSLTJ NEW/EST LOW 45: CPT

## 2023-04-20 RX ORDER — ARIPIPRAZOLE 2 MG/1
2 TABLET ORAL DAILY
Status: DISCONTINUED | OUTPATIENT
Start: 2023-04-20 | End: 2023-04-22 | Stop reason: HOSPADM

## 2023-04-20 RX ADMIN — ARIPIPRAZOLE 2 MG: 2 TABLET ORAL at 16:16

## 2023-04-20 RX ADMIN — OLANZAPINE 10 MG: 5 TABLET, ORALLY DISINTEGRATING ORAL at 03:38

## 2023-04-20 RX ADMIN — METFORMIN HYDROCHLORIDE 500 MG: 500 TABLET, EXTENDED RELEASE ORAL at 17:45

## 2023-04-20 ASSESSMENT — ACTIVITIES OF DAILY LIVING (ADL)
ADLS_ACUITY_SCORE: 35

## 2023-04-20 NOTE — CONSULTS
[facility] COURT- PROBATE/MENTAL HEALTH DIVISION  FOURTH ([place])  _________________________________________________________________________    In the Matter of the Civil Commitment of: Alexander Mccoy    :1985     Respondent: Alexander Mccoy     WRITTEN REQUEST FOR AUTHORIZATION TO IMPOSE TREATMENT AND REQUEST FOR HEARING  DC File No.  [id number]___ [place]  File No. __________       I, CLARA Elias CNP to the best of my knowledge, information, and belief respectfully represent:    1.I am a member of the treatment team for the respondent.    2.Respondent was born on 1985     3.Respondent is presently receiving care and treatment at: [facility] ED    4.Diagnostic studies performed by CLARA Elias CNP on respondent appear to indicate that respondent suffers from:    Unspecified psychosis    5.   CLARA Elias CNP  has determined neuroleptic medication(s) to be medically necessary.    6.   I have determined that the benefits of administration of the proposed treatment to the respondent outweigh the risks and therefore this procedure is reasonable.    7.   Respondent s written informed consent to the administration of the above procedure has notbeen obtained because of incompetency to make a rational decision regarding the proposedtreatment.    8.The objective of the proposed treatment is to treat the symptoms of the mental illness that interfere with the respondent s ability to function.    9.Petitioner requests that a hearing be scheduled, and that authorization to administer the proposed treatment be granted according to law.     Dated: 2023    Providers s Signature: _________________________  Print Name: CLARA Elias CNP {Psychiatric clinical nurse specialist/Nurse Practitioner      [facility] EMERGENCY DEPT  [address]  [facility] [address]  [phone]  [phone]

## 2023-04-20 NOTE — ED NOTES
Patients mother came to visit the patient, while the mother was leaving the room the patient became agitated. Patient came to the door demanding a  and stating that the undercover agents forced him to come here against his will and that we (the hospital) are blaming him. Pt then advised that he is not making it up, he has seen vehicles riding by his house with bible verses on their license plate. Pt advises that the undercover government agents and undercover  have been watching him and messing with him. Patient talked through his feelings and went back to his bed.

## 2023-04-20 NOTE — ED NOTES
Pt refusing BG checks citing 72 hr hold paperwork consent to medical care. MD notified. Pt saying he has a right to a . Phone offered. Pt denies.

## 2023-04-20 NOTE — ED NOTES
No concerns on shift. Patient declined sugar check. Waiting placement for Oneil Reynoso MD  04/20/23 0392

## 2023-04-20 NOTE — ED PROVIDER NOTES
This 38 year old type II diabetic male patient was under my care during my shift from 1400 to 2200 on 4/219/23.  He is in the emergency department for paranoia/delusions/psychosis pending psychiatric admission.  Psychiatry is pursuing petition.  I did not meet with the patient nor did he require any interventions.  At the end of my shift his care was transferred to my partner, Dr. Vickers.       Caitlin Aguilar MD  04/19/23 4908

## 2023-04-20 NOTE — PROGRESS NOTES
"Triage & Transition Services, Extended Care     Alexander Mccoy  2023    Alexander is followed related to Long wait time for admission. Please see initial DEC Crisis Assessment completed for complete assessment information. Medical record is reviewed. While patient is in the ED, care team is working towards Learn and Demonstrate at Least One Skill Focused on Crisis Stabilization.     Patient was engaged in interaction. He was flat in affect and pleasant. Requests information regarding his discharge. Was informed while his hold does  tomorrow, the [facility] screened him for commitment and this will keep him in the hospital passed his 72HH. He appears to understand this. He is fixated on his rights under his 72HH today in interaction and reports he does not consent to medical procedures and states \"I had my blood drawn yesterday without my consent\". He reported this several times. Writer validated Patient concerns and encouraged him to talk with the doctor regarding medical interventions he does not want done. He also reported to writer \"I have no mental health so intervention is not needed\". This appeared to calm Patient. Patient thanked writer for interaction.     Plan:  Inpatient Mental Health: Patient is on a 72HH that expires  at 1019. [facility] is pursuing commitment and paperwork will arrive tomorrow morning. Patient is delusional in interaction and continues to lack insight into his mental health.     Plan for Care reviewed with Assigned Medical Provider? Yes. Provider, Dr. Varner, response: agreeable.    Extended Care will follow and meet with patient/family/care team as able or requested.     Corinne Romitti, [facility], Extended Care   [phone]          "

## 2023-04-20 NOTE — CONSULTS
"      Initial Psychiatric Consult   Consult date: April 20, 2023         Reason for Consult, requesting source:    Miner  Requesting source: Oneil Salas    Labs and imaging reviewed. Discussed with nursing        Video-Visit Details    Type of service:  Video Visit    Video Start Time (time video started): 1400    Video End Time (time video stopped): 1422    Originating Location (pt. Location): Other [facility] ED    Distant Location (provider location):  Off-site    Mode of Communication:  Video Conference via AmCLARA Chopra CNP          HPI:   Alexander Mccoy is a 38 year old male with a history of type 2 diabetes and no known psychiatric history who presented on 4/18/23 with paranoia.  Police brought him in on an [place] hold after he went to the police station to ask about undercover government agents and got into an argument with the .  Commitment process has been started, I am asked to evaluate if Miner is necessary.    I met with Alexander virtually today, he is seen in the emergency room.  He is cooperative with assessment.  He continues to talk about people following him, undercover agents, and a man who is going to be released from residential next year.  His thoughts seem disorganized and his stories are difficult to follow along.  He also often just uses \"they\" or \"he\" and when asked who he is talking about, he is unable to give any clear answer stating he forgot their name.  He does admit to raising his voice at the , but denies getting into any argument.  He feels that they should have told him about undercover agents and that he deserves to have this information.  He wants to know the address of some unknown man who he knows through a woman he used to date, as this man will be released from residential next year and he feels he should know his address to know how to avoid him.  He denies any problems with his sleep, states he tends to go to sleep late and " "sleep in late but this does not cause problems for him.  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  Now feels he is unable to work because \"the government will not tell me anything\".  He lives with his parents, he does the grocery shopping and cooking.  He denies any recent drug use.  He denies any hallucinations, however he describes seeing guns that other people did not see, though he thinks that they were lying about not being able to see them.          Past Psychiatric History:   He denies any past psychiatric history or any past similar episodes.  He has never taken any psychotropic medications.  No psychiatric history per chart review.            Substance Use and History:   He denies recent use, vaguely states that he used to use drugs but no longer-he does not wish to provide details on this at this time.        Past Medical History:   PAST MEDICAL HISTORY:   Past Medical History:   Diagnosis Date     Asthma     as a kid        PAST SURGICAL HISTORY:   Past Surgical History:   Procedure Laterality Date     NO HISTORY OF SURGERY               Family History:   FAMILY HISTORY: No family history on file.        Social History:   SOCIAL HISTORY:   Social History     Tobacco Use     Smoking status: Never     Smokeless tobacco: Never   Vaping Use     Vaping status: Not on file   Substance Use Topics     Alcohol use: No     He is single, currently living with his parents.  He does not have any children.  Used to work as a  but got laid off 3 or 4 years ago.         Physical ROS:   The 10 point Review of Systems is negative other than noted in the HPI or here.    Review Of Systems  Skin: negative  Eyes: negative  Ears/Nose/Throat: negative  Respiratory: No shortness of breath, dyspnea on exertion, cough, or hemoptysis  Cardiovascular: negative  Gastrointestinal: negative  Genitourinary: negative  Musculoskeletal: negative  Neurologic: " "negative  Psychiatric: negative  Hematologic/Lymphatic/Immunologic: negative  Endocrine: diabetes          Medications:       insulin aspart  1-7 Units Subcutaneous TID AC     insulin aspart  1-5 Units Subcutaneous At Bedtime     metFORMIN  500 mg Oral Daily with supper              Allergies:   No Known Allergies       Labs:     Recent Results (from the past 48 hour(s))   Glucose by meter    Collection Time: 04/18/23  5:14 PM   Result Value Ref Range    GLUCOSE BY METER POCT 250 (H) 70 - 99 mg/dL   Glucose by meter    Collection Time: 04/18/23 10:17 PM   Result Value Ref Range    GLUCOSE BY METER POCT 257 (H) 70 - 99 mg/dL   Glucose by meter    Collection Time: 04/19/23  9:10 AM   Result Value Ref Range    GLUCOSE BY METER POCT 226 (H) 70 - 99 mg/dL   Glucose by meter    Collection Time: 04/19/23  1:48 PM   Result Value Ref Range    GLUCOSE BY METER POCT 293 (H) 70 - 99 mg/dL   Glucose by meter    Collection Time: 04/19/23  6:10 PM   Result Value Ref Range    GLUCOSE BY METER POCT 261 (H) 70 - 99 mg/dL   Glucose by meter    Collection Time: 04/19/23 10:55 PM   Result Value Ref Range    GLUCOSE BY METER POCT 284 (H) 70 - 99 mg/dL          Physical and Psychiatric Examination:     BP (!) 138/97   Pulse 77   Temp 97.6  F (36.4  C) (Oral)   Resp 18   Ht 1.651 m (5' 5\")   Wt 95.3 kg (210 lb)   SpO2 98%   BMI 34.95 kg/m    Weight is 210 lbs 0 oz  Body mass index is 34.95 kg/m .    Physical Exam:  I have reviewed the physical exam as documented by by the medical team and agree with findings and assessment and have no additional findings to add at this time.    Mental Status Exam:    Appearance: awake, alert and adequately groomed  Attitude:  cooperative  Eye Contact:  fair  Mood:  \"depressed from being here\" denies that his mood has been depressed prior to admission  Affect:  flat  Speech:  clear, coherent and pressured speech  Psychomotor Behavior:  no evidence of tardive dyskinesia, dystonia, or tics  Thought " "Process:  disorganized and illogical  Associations:  no loose associations  Thought Content:  no evidence of suicidal ideation or homicidal ideation and paranoid delusions present, possible recent visual hallucinations  Insight:  partial  Judgement:  limited  Oriented to:  time, person, and place  Attention Span and Concentration:  fair  Recent and Remote Memory:  intact                 DSM-5 Diagnosis:   Unspecified psychosis          Assessment:   Alexander Mccoy is a 38 year old male with no known or reported psychiatric history who presents with psychosis. His UDS was negative and he denies any recent drug use. Does not appear that any collateral information from family was able to be obtained as of yet, so no clear diagnosis at this point. He does not seem to present as overtly manic and describes his mood as \"depressed\", though later when I try to talk about his depressed mood he insists that he is only depressed because he is being held in the hospital. He adamantly refuses any mental health treatment, insisting that all of what he has shared is true. He refuses any kind of medication other than his metformin, stating that medication \"makes everything worse\". I do think Miner will be necessary due to his refusal to take medication, and he does appear to be a danger to others based on his erratic behaviors prior to admission resulting in an altercation with police.     I will start aripiprazole for psychosis- this medication has shown promise in use for first episode psychosis and is less likely to have significant impact on blood glucose compared to some other antipsychotic medications. He does have the right to refuse this medication at this time.          Summary of Recommendations:   1.  Petition for commitment has been started by [facility]. I will file for Miner as well.    2.  I have ordered aripiprazole 2mg daily for psychosis, however at this time he does have the right to refuse this " medication.      CLARA Elias CNP    Consult/Liaison Psychiatry   [facility]    Contact information available via [facility] Paging/Directory  If I am not available, then [facility] CL line ([phone]) should know who is covering our consult service.

## 2023-04-20 NOTE — TELEPHONE ENCOUNTER
R  Pt on worklist pending bed availability.  Currently no beds within [place] -  Search initiated at 8:20am  [facility] has 2 beds and willing to review.   All Information gathered and faxed @ 8:54am -  Awaiting determination  Intake followed up with a call to [facility] to see how their review was -  And if pt accepted @ 1:03pm    Pt was declined due to acuity for [facility].   Worklist also updated @ 1:05pm

## 2023-04-20 NOTE — TELEPHONE ENCOUNTER
[facility] Access Inpatient Bed Call Log 4/20/2023 12:13 AM      Intake has called facilities that have not updated their bed status within the last 12 hours.     Adults:         [facility] is posting 0 beds.      [facility] is posting 0 beds. [phone]      [facility] is posting 0 beds. [phone]     [facility] is posting 0 beds. [phone] -12:14am Per Ana María, they are capped.    [facility] is posting 0 beds. [phone]     [facility] is posting 0 bed. [phone]      [facility] is posting 0 beds. [phone]     [facility] is posting 0 beds. [phone]     [facility] is posting 0 beds. [phone]     [facility] is posting 0 beds. [phone]          [facility] is posting 0 beds. Mixed unit 12+. Low acuity only.  [phone]     [facility] is posting 0 beds. No aggression.  [phone]     [facility] is posting 0 beds. [phone]      [facility] is posting 0 beds. [phone]      [facility] is posting 2 beds. [phone]      [facility] is posting 0 beds. Low acuity. [phone]     [facility] is posting 1 beds. 72 hr hold preferred. [phone]      [facility] is posting 5 beds.  [phone]           [facility] is posting 3 bed. Voluntary only- no holds/commitments, No Hx of aggression, violence, or assault. No sexual offenders. No 72 hr holds. [phone]     [facility] is posting 6 beds. (Must have the cognitive ability to do programming. No aggressive or violent behavior or recent HX in the last 2 yrs. MH must be primary.) [phone]      [facility] is posting 0 beds. Low acuity only. Violence and aggression capped. [phone]      [facility] 0 beds. Low acuity, Neg Covid. [phone]     [facility] is posting 0 beds. Covid neg. Vol only. Combined adolescent and adult unit. No aggressive or violent  behavior. No registered sex offenders. [phone]      [facility] [place] posting 0 beds. Negative covid.      [facility] is posting 4 beds. Call for details. [phone]      [facility] is posting 3 beds. [phone]      Pt remains on work list pending appropriate bed availability.

## 2023-04-20 NOTE — CONSULTS
[facility]- PROBATE/MENTAL HEALTH DIVISION  FOURTH ([place])    NEUROLEPTIC MEDICATION NOTE FOR WINTER PROCEEDINGS      Patient s name: Alexander Mccoy    FOR EMERGENCY USE ONLY:If a medical emergency has been declared before this note, please describe:   a.The basis for the emergency:   b.The neuroleptic medications provided:   ____________________________________________________________________________________________________________________________________________________________     1. Briefly describe the patient s clinical condition that supports a recommendation for the treatment with neuroleptic medication: Psychosis with paranoid delusions, visual hallucinations. Believes undercover agents/government agents are following him, and that there is a man who will be release from [facility] who may try to harm him. Was brought in by police after he came to the police station to confront them about undercover agents, got into altercation with police.     2.   List the working diagnosis(es) of the condition(s) for which neuroleptic medication is recommended: Unspecified psychosis    3. Is neuroleptic medication the treatment of choice in prevailing medical practice?  yes or no: yes    4. Treatment options other than neuroleptics that alone effectively treat the illness: Antipsychotics preferred treatment for acute psychosis    5.   Medication ordered or proposed: (up to 4 medications, no more than two to be used simultaneously unless meds are being changed or emergency exists, unless otherwise specified):  Aripiprazole, risperidone, haloperidol, olanzapine. OR: unusual circumstances exist under which physician requests authorization to use any FDA approved neuroleptic.  Identify unusual circumstances and describe proposed course of treatment.     6.   Document the proposed course of treatment with neuroleptic medication, i.e., how the medication will be prescribed,  monitored, and adjusted: Clinically monitored and adjusted by psychiatry team.    7.   If the patient has a known record with neuroleptics, please summarize his/her history regarding risks/benefits and whether this is predictive of expected response: No known history.    8. List the possible risks and side effects, and what can be done should they occur.  Include any specific risks associated with the patient s age/gender/ethnic identity or medical condition.  Does this patient have any medical conditions that could be exacerbated by neuroleptics? Risks include cardiac effects, seizures, weight gain, metabolic syndrome, NMS, TD, EPS, glucose changes, lipid shifts, sedation. He does have a history of type 2 diabetes which could be exacerbated.      9. Indicate likely benefits and outcomes for the patient after treatment with neuroleptic medication, including prognosis if neuroleptic medication is not administered (even if other forms of therapy are utilized): Return to baseline level of functioning.    10. Does this patient have tardive dyskinesia?   yes or no: no  If yes, how serious is the tardive dyskinesia, why are neuroleptics still indicated and, if applicable, why use typical as opposed to atypical neuroleptics?     11. For a patient to have the capacity to decide about the use of neuroleptics, the answers to a), b) and c) below must be answered  yes.   If one or more of these questions is answered  no,  the Court may find that the patient lacks this capacity, and the Court may make the decision after a hearing.    a)Does the patient demonstrate awareness of the nature of the patient s situation, including the reasons for hospitalization and possible consequences of refusing treatment with neuroleptic medication? yes or no: no    b) Does the patient have an understanding of treatment with neuroleptic medication including the risks, benefits, and alternatives? yes or no: no     c)   Does the patient communicate  verbally or nonverbally a clear choice regarding treatment that is reasoned and not based on a symptom of the patient s mental illness, even though it may not be in the patient s best interests?   yes or no: no    d)  If the patient objects, is the objection based on family, community, moral, Sikh, and/or social values?  yes or no: no If yes, briefly describe:     12. Document specific efforts that have been made to assist patient in understanding the risks and benefits: Education including diagnosis, treatment options, risks and benefits    13. If the patient is consenting to the medication, why is a court order necessary?  If the patient has history of  unreliable consent,  please summarize: He is refusing medication.    14. Is the proposed treatment experimental or part of a research study?  yes or no: no    15. In this patient s case, do the benefits of the treatment outweigh the risks?  Please summarize: Yes, at present he is unable to manage safely in the community and presents as a danger to others.    The above statements represent my opinion within a reasonable degree of medical certainty.      Physician s or Other Provider s Signature: _______________________________    Date: April 20, 2023  Time: 2:53 PM    Printed Provider s Name: CLARA Elias CNP {Psychiatric clinical nurse specialist/Nurse Practitioner  [facility] EMERGENCY DEPT  [address] [facility] [address]  [phone]  [phone]

## 2023-04-21 ENCOUNTER — MEDICAL CORRESPONDENCE (OUTPATIENT)
Dept: HEALTH INFORMATION MANAGEMENT | Facility: CLINIC | Age: 38
End: 2023-04-21

## 2023-04-21 LAB
GLUCOSE BLDC GLUCOMTR-MCNC: 301 MG/DL (ref 70–99)
GLUCOSE BLDC GLUCOMTR-MCNC: 332 MG/DL (ref 70–99)

## 2023-04-21 PROCEDURE — 250N000013 HC RX MED GY IP 250 OP 250 PS 637: Performed by: EMERGENCY MEDICINE

## 2023-04-21 PROCEDURE — 82962 GLUCOSE BLOOD TEST: CPT

## 2023-04-21 RX ORDER — ACETAMINOPHEN 325 MG/1
650 TABLET ORAL ONCE
Status: COMPLETED | OUTPATIENT
Start: 2023-04-21 | End: 2023-04-21

## 2023-04-21 RX ORDER — METFORMIN HCL 500 MG
1000 TABLET, EXTENDED RELEASE 24 HR ORAL
Status: DISCONTINUED | OUTPATIENT
Start: 2023-04-21 | End: 2023-04-22 | Stop reason: HOSPADM

## 2023-04-21 RX ADMIN — INSULIN ASPART 4 UNITS: 100 INJECTION, SOLUTION INTRAVENOUS; SUBCUTANEOUS at 18:00

## 2023-04-21 RX ADMIN — METFORMIN HYDROCHLORIDE 1000 MG: 500 TABLET, EXTENDED RELEASE ORAL at 18:28

## 2023-04-21 RX ADMIN — ACETAMINOPHEN 650 MG: 325 TABLET ORAL at 16:54

## 2023-04-21 ASSESSMENT — ACTIVITIES OF DAILY LIVING (ADL)
ADLS_ACUITY_SCORE: 35

## 2023-04-21 NOTE — ED NOTES
"Pt asking why his paperwork states he is mentally ill, \"I'm not mentally ill\"  Pt assured by this writer that he will be evaluated every 24hrs and he can speak to his  about his concerns tomorrow.  Pt agreeable at this time, resting on his bed with his paperwork.  "

## 2023-04-21 NOTE — ED PROVIDER NOTES
I received this patient in signout from Dr. Vickers.  Patient presenting with paranoia, psychosis, has a history of underlying diabetes and medication noncompliance.  He is currently awaiting inpatient mental health bed availability.    No acute events under my care in the ED.  In discussion with pharmacy, patient was resumed on metformin, and they have recommended increasing to 1000 mg nightly.    [place] has pursued commitment and will fax paperwork over.  Patient is aware.    Sign-out to evening provider pending transfer to inpatient mental health unit.     Santo Fish MD  04/21/23 7423       Santo Fish MD  04/21/23 0143

## 2023-04-21 NOTE — TELEPHONE ENCOUNTER
"Intake received Notice and order for custody, examination and hearing on 04/21/2023 at 1:38:55 PM in right fax  And moved document to RF folder \"court documentation\".   "

## 2023-04-21 NOTE — ED NOTES
Patient is awaiting mental health transfer.  No agitation on my shift.  No issues on my shift here.

## 2023-04-21 NOTE — CONSULTS
Triage and Transition - Consult and Liaison     Alexander Mccoy  April 20, 2023    Kristofer submitted today 4/20/23 to [facility].      IKE SELLERS MSW, [facility]  Triage and Transition - Consult and Liaison   [phone]

## 2023-04-21 NOTE — ED NOTES
Pt reportedly called 911 to talk to a [place] [employer] officer. Pt has been asking about his civil commitment. Writer informed pt that he cannot call 911 and the phone was removed from his room. Sgt. Florence from [place] [employer] is loosely following the patient's case and can be reached at [phone]- this is his direct number.

## 2023-04-21 NOTE — TELEPHONE ENCOUNTER
Updated Bed Search @ 734pm  Per chart review, intake can look in the state for placement     [facility] has 0 appropriate beds available. Phone: [phone]  [facility] posting 0 available beds. Phone: [phone]  [facility] posting 0 available beds. Phone: [phone]  [facility] posting 0 available beds. Phone: [phone]  [facility] posting 0 available beds. Phone: [phone]  [facility] posting 0 available beds. Phone:[phone]  [facility] posting 0 available beds. Phone: [phone]. Negative covid required.   [facility] posting 0 available beds. Phone: [phone]   [facility] posting 0 available beds. Phone: [phone] Negative covid required. Low acuity only.   [facility] posting 0 available beds. Phone: [phone]  [facility] posting 1 available beds. Phone: [phone]. Need a negative covid. Mixed unit with adolescents, adults, geriatric patients. Age 12 and up. Low acuity referrals only. Pt not appropriate.   [place] posting 0 available beds. Phone: [phone]. No aggression. Negative covid required.   [facility] posting 0 available beds. Phone: [phone]  [facility] posting 0 available beds. Phone: [phone]. Covid negative.   [place] posting 0 available beds. Phone: [phone]. No current aggression. Negative covid required.   [facility] posting 0 available beds. Phone:[phone]  [facility] posting 0 available beds. Phone: [phone]. Very low acuity only.   [facility] posting 1 available beds. Phone:[phone]. Low acuity only. Pt not appropriate.   [facility] posting 1 available beds. Phone: [phone]. Per call to Ana María, they only have low acuity beds available this evening, pt not appropriate.   [facility] posting 4 available beds. Phone: [phone]. Voluntary patients only. Negative covid required. Pt not appropriate.   [facility] posting 0 available beds. Phone: [phone]   [facility] posting 0 available beds.  Phone: [phone]   [facility] posting 8 available beds. Phone: [phone]. Must have cognitive ability to do programming. No aggression or violence, or recent history of such in the last 2 years. Mental health must be primary. Negative covid required.   [facility] posting 0 available beds. Phone: [phone]  [facility] posting 2 available beds. Phone: [phone]. Low acuity only. Negative covid required.   [facility] posting 0 available beds. Phone: [phone]  [facility] posting 1 available beds. Phone: [phone]. Covid negative required. Voluntary admissions only. No history or recent aggression or violence. No registered sex offenders. Does not provide Detox or CD services. Pt not appropriate.   [facility] posting 0 available beds. Phone: [phone]  [facility] posting 0 available beds. Phone: [phone]  [facility] posting 0 available beds. Phone: [phone]    Pt remains on work list pending appropriate bed placement.

## 2023-04-21 NOTE — ED NOTES
Pt continues at this time to be fixated on his 72 hr papers. Repeatedly requesting to be verified of his expiration date and time.  Pt given paperwork

## 2023-04-21 NOTE — TELEPHONE ENCOUNTER
No appropriate beds are currently available within the  system. Bed search update 4/21 @ 1:20AM:       [facility]: @ cap per website  [facility]: @ cap per website  [facility]: @ cap per website. Per Aka @ 12AM they are full   [facility]: @ cap per website  [facility]: @ cap per website  [facility]: @ cap per website  [facility]: @ cap per website  [facility]: @ cap per website  [facility]: Posting 1 bed (Mixed unit/Low acuity only). Per Vi @ 00:53 they are full [facility]: @ cap per website  [facility]: @ cap per website  [facility]: @ cap per website  [facility]: Does not review after 10PM.    [facility]: @ cap per website  [facility]: @ cap per website  [facility]: @ cap per website  [facility]: @ cap per website  [facility]: Posting 8 beds. (Low acuity only. Must have the cognitive ability to do programming. No aggressive or violent behavior or recent HX in the last 2 yrs. MH must be primary).  [facility]: @ cap per website  [facility]: Posting 2 beds. Low acuity, Neg Covid. Per call @ 00:10 they are screening a few and can only take LOW acuity   [facility]: Posting 1 bed (Covid neg. Voluntary only. Mixed unit. No aggressive or violent behavior. No registered sex offenders). Meets exclusionary criteria d/t 72 [facility]: Posting 2 beds. Facility is in [place]. Pt is on a [place] [facility]: Posting 3 beds.  Per Leatha @ 00:14, they are at capacity       Pt remains on work list until appropriate placement is available

## 2023-04-21 NOTE — TELEPHONE ENCOUNTER
R:  32 on 4A/Toshia    4:51 PM DEC updated that court Civil commitment papers received.    4:58 PM Paged 30 Provider    5:00 PM Discussed Pt with Stephie.  Pt is tentatively approved if unit is able to handle him.  Unit is currently high acuity and unstable.  If not; unable to approve for the bed.    5:01 PM Called Unit 30 and left message for 30 Charge to call back.    5:30 PM Discussed Pt with 30 Charge.Unit very highly acute and now only bed available is a shared M bed.  Pt has psychosis and paranoia.  Charge will review and call back.    5:34 PM unit 30 Charge called back and Pt not appropriate for shared M bed.  Recommend him for a private but none available currently.    5:58 PM Danny approved Pt for 32/Toshia.  There is a Dbl for a possible private.    Updated Worklist; did Indicia and added to admit Board    6:12 PM Updated unit 4A Charge.  They will have to block a bed to make a private.    6:16 PM Updated [facility] ED    6:24 PM unit 4A Charge called and stated there is a health risk concern with Pts refusal of Diabetic medication.  She already discussed Pt with Danny and will paging Toshia for the final decision.

## 2023-04-21 NOTE — PROGRESS NOTES
"Triage & Transition Services, Extended Care     Therapy Progress Note    Patient: Alexander goes by \"Alexander,\" uses he/him pronouns  Date of Service: April 21, 2023  Site of Service: [facility] ED  Patient was seen virtually (Generic Media cart or other teleconferencing device).     Presenting problem:   Alexander is followed related to long wait time for admission. Please see initial DEC/LM Crisis Assessment completed by Nimo Hill on 4/18/2023 for complete assessment information. Notable concerns include paranoia.     Individuals Present: Alexander & Corinne Romitti, LICSW    Session start: 12:40pm  Session end: 1:00pm  Session duration in minutes: 20  Session number: 1  Anticipated number of sessions or this episode of care: 1-3  CPT utilized: 47362 - Psychotherapy (with patient) - 30 (16-37*) min    Current Presentation:   Patient is engaged in today's interaction. He is fixated on his 72HH expiring and his rights being violated. Provided psychoeducation on court hold in relation to commitment process. He did not appear to grasp this information and continues to state we are violated his rights. Reports \"I am the most sane person in this building\". He expressed being upset that no one is believe that \"the government is messing with me\". Expressed frustration that \"I was tricked into coming here\" and he does not feel he has mental health and does not want to be hospitalized. Writer spent time validating and providing psycheducation.     Mental Status Exam:   Appearance: awake, alert  Attitude: cooperative  Eye Contact: good  Mood: frustrated  Affect: flat  Speech: clear, coherent  Psychomotor Behavior: no evidence of tardive dyskinesia, dystonia, or tics  Thought Process:  illogical  Associations: no loose associations  Thought Content: obsessions present  Insight: limited  Judgement: limited  Oriented to: time, person, and place  Attention Span and Concentration: intact  Recent and Remote Memory: intact    Diagnosis:   300.00 (F41.9) " "Unspecified Anxiety Disorder   298.8  (F28) Other spec. Schizophrenia Spectrum  - provisional      Therapeutic Intervention(s):   Provided active listening, unconditional positive regard, and validation. Explored motivation for behavioral change.    Treatment Objective(s) Addressed:   The focus of this session was on rapport building.     Progress Towards Goals:   Patient reports stable symptoms. Patient is not making progress towards treatment goals as evidenced by continued delusions and lack of insight.     General Recommendations:   Continue to monitor for harm. Consider: Use clear and concise directions, too many words can be overwhelming, Allow family calls/visits, Use \"First.. Then...\" language and Verbally state expectations     Plan:   Inpatient Mental Health: Continues to be experiencing delusions and lacking insight into mental health symptomology. Is on a court hold through [facility] as they are pursuing commitment.    Plan for Care reviewed with Assigned Medical Provider? Yes. Provider, Dr. Fish, response: agreeable.     Corinne Romitti, [facility]   Licensed Mental Health Professional (LMHP), [facility]  [phone]        "

## 2023-04-21 NOTE — ED NOTES
"Pt states he does not want his blood sugar checked anymore, \"I can take care of that at home, I'm good at it.  I don't want it done here.\"    "

## 2023-04-21 NOTE — TELEPHONE ENCOUNTER
11:17 AM: Intake presented Pt to [facility]. Pt could not be accepted at this time d/t Miner order.    11:42 AM: paged Provider to present for station 30/Stephie.    12:55 PM: Stephie reviewed Pt and requested for Intake to contact [facility] ED to check on status of Miner/commitment orders.    1:01 PM: Intake called [facility] to inquire about Miner/Commitment order placed on     1:26 PM: called DEC to check if commitment papers have been approved. Unable to clarify at this time. DEC to call Intake with update.

## 2023-04-22 ENCOUNTER — HOSPITAL ENCOUNTER (INPATIENT)
Facility: CLINIC | Age: 38
LOS: 45 days | Discharge: PSYCHIATRIC OR SUBSTANCE USE FACILITY | End: 2023-06-06
Attending: SURGERY | Admitting: PSYCHIATRY & NEUROLOGY
Payer: COMMERCIAL

## 2023-04-22 VITALS
RESPIRATION RATE: 18 BRPM | WEIGHT: 210 LBS | SYSTOLIC BLOOD PRESSURE: 132 MMHG | TEMPERATURE: 98.5 F | BODY MASS INDEX: 34.99 KG/M2 | HEART RATE: 81 BPM | DIASTOLIC BLOOD PRESSURE: 97 MMHG | OXYGEN SATURATION: 99 % | HEIGHT: 65 IN

## 2023-04-22 DIAGNOSIS — G89.29 CHRONIC BILATERAL LOW BACK PAIN WITH LEFT-SIDED SCIATICA: ICD-10-CM

## 2023-04-22 DIAGNOSIS — J30.2 SEASONAL ALLERGIC RHINITIS, UNSPECIFIED TRIGGER: ICD-10-CM

## 2023-04-22 DIAGNOSIS — E11.9 TYPE 2 DIABETES, HBA1C GOAL < 7% (H): ICD-10-CM

## 2023-04-22 DIAGNOSIS — M54.42 CHRONIC BILATERAL LOW BACK PAIN WITH LEFT-SIDED SCIATICA: ICD-10-CM

## 2023-04-22 DIAGNOSIS — F20.0 PARANOID SCHIZOPHRENIA (H): Primary | ICD-10-CM

## 2023-04-22 DIAGNOSIS — H93.13 TINNITUS, BILATERAL: ICD-10-CM

## 2023-04-22 PROBLEM — F29 PSYCHOSIS (H): Status: ACTIVE | Noted: 2023-04-22

## 2023-04-22 LAB
GLUCOSE BLDC GLUCOMTR-MCNC: 248 MG/DL (ref 70–99)
GLUCOSE BLDC GLUCOMTR-MCNC: 257 MG/DL (ref 70–99)
GLUCOSE BLDC GLUCOMTR-MCNC: 268 MG/DL (ref 70–99)

## 2023-04-22 PROCEDURE — 99222 1ST HOSP IP/OBS MODERATE 55: CPT | Performed by: PHYSICIAN ASSISTANT

## 2023-04-22 PROCEDURE — 82962 GLUCOSE BLOOD TEST: CPT

## 2023-04-22 PROCEDURE — 250N000013 HC RX MED GY IP 250 OP 250 PS 637: Performed by: PHYSICIAN ASSISTANT

## 2023-04-22 PROCEDURE — 250N000013 HC RX MED GY IP 250 OP 250 PS 637: Performed by: PSYCHIATRY & NEUROLOGY

## 2023-04-22 PROCEDURE — 124N000002 HC R&B MH UMMC

## 2023-04-22 RX ORDER — METFORMIN HCL 500 MG
1000 TABLET, EXTENDED RELEASE 24 HR ORAL
Status: DISCONTINUED | OUTPATIENT
Start: 2023-04-22 | End: 2023-04-22

## 2023-04-22 RX ORDER — OLANZAPINE 10 MG/2ML
10 INJECTION, POWDER, FOR SOLUTION INTRAMUSCULAR 3 TIMES DAILY PRN
Status: DISCONTINUED | OUTPATIENT
Start: 2023-04-22 | End: 2023-06-06 | Stop reason: HOSPADM

## 2023-04-22 RX ORDER — METFORMIN HCL 500 MG
1000 TABLET, EXTENDED RELEASE 24 HR ORAL
Status: DISCONTINUED | OUTPATIENT
Start: 2023-04-22 | End: 2023-04-23

## 2023-04-22 RX ORDER — OLANZAPINE 10 MG/1
10 TABLET ORAL 3 TIMES DAILY PRN
Status: DISCONTINUED | OUTPATIENT
Start: 2023-04-22 | End: 2023-06-06 | Stop reason: HOSPADM

## 2023-04-22 RX ORDER — AMOXICILLIN 250 MG
1 CAPSULE ORAL 2 TIMES DAILY PRN
Status: DISCONTINUED | OUTPATIENT
Start: 2023-04-22 | End: 2023-06-06 | Stop reason: HOSPADM

## 2023-04-22 RX ORDER — ARIPIPRAZOLE 5 MG/1
5 TABLET ORAL DAILY
Status: DISCONTINUED | OUTPATIENT
Start: 2023-04-22 | End: 2023-04-26

## 2023-04-22 RX ORDER — ACETAMINOPHEN 325 MG/1
650 TABLET ORAL EVERY 4 HOURS PRN
Status: DISCONTINUED | OUTPATIENT
Start: 2023-04-22 | End: 2023-06-06 | Stop reason: HOSPADM

## 2023-04-22 RX ORDER — MAGNESIUM HYDROXIDE/ALUMINUM HYDROXICE/SIMETHICONE 120; 1200; 1200 MG/30ML; MG/30ML; MG/30ML
30 SUSPENSION ORAL EVERY 4 HOURS PRN
Status: DISCONTINUED | OUTPATIENT
Start: 2023-04-22 | End: 2023-06-06 | Stop reason: HOSPADM

## 2023-04-22 RX ORDER — TRAZODONE HYDROCHLORIDE 50 MG/1
50 TABLET, FILM COATED ORAL
Status: DISCONTINUED | OUTPATIENT
Start: 2023-04-22 | End: 2023-05-05

## 2023-04-22 RX ORDER — HYDROXYZINE HYDROCHLORIDE 25 MG/1
25 TABLET, FILM COATED ORAL EVERY 4 HOURS PRN
Status: DISCONTINUED | OUTPATIENT
Start: 2023-04-22 | End: 2023-06-06 | Stop reason: HOSPADM

## 2023-04-22 RX ORDER — NICOTINE POLACRILEX 4 MG
15-30 LOZENGE BUCCAL
Status: DISCONTINUED | OUTPATIENT
Start: 2023-04-22 | End: 2023-06-06 | Stop reason: HOSPADM

## 2023-04-22 RX ORDER — DEXTROSE MONOHYDRATE 25 G/50ML
25-50 INJECTION, SOLUTION INTRAVENOUS
Status: DISCONTINUED | OUTPATIENT
Start: 2023-04-22 | End: 2023-06-06 | Stop reason: HOSPADM

## 2023-04-22 RX ADMIN — ACETAMINOPHEN 325MG 650 MG: 325 TABLET ORAL at 18:20

## 2023-04-22 RX ADMIN — METFORMIN HYDROCHLORIDE 1000 MG: 500 TABLET, EXTENDED RELEASE ORAL at 17:12

## 2023-04-22 ASSESSMENT — ACTIVITIES OF DAILY LIVING (ADL)
ORAL_HYGIENE: INDEPENDENT
ADLS_ACUITY_SCORE: 18
ADLS_ACUITY_SCORE: 18
CONCENTRATING,_REMEMBERING_OR_MAKING_DECISIONS_DIFFICULTY: NO
ADLS_ACUITY_SCORE: 18
ADLS_ACUITY_SCORE: 18
DRESSING/BATHING_DIFFICULTY: NO
ADLS_ACUITY_SCORE: 35
ADLS_ACUITY_SCORE: 18
DRESS: INDEPENDENT
ADLS_ACUITY_SCORE: 18
HYGIENE/GROOMING: INDEPENDENT
DIFFICULTY_EATING/SWALLOWING: NO
DRESS: INDEPENDENT
LAUNDRY: WITH SUPERVISION
LAUNDRY: WITH SUPERVISION
DOING_ERRANDS_INDEPENDENTLY_DIFFICULTY: NO
WALKING_OR_CLIMBING_STAIRS_DIFFICULTY: NO
HYGIENE/GROOMING: INDEPENDENT
ADLS_ACUITY_SCORE: 18
ADLS_ACUITY_SCORE: 18
TOILETING_ISSUES: NO
WEAR_GLASSES_OR_BLIND: NO
ADLS_ACUITY_SCORE: 18
CHANGE_IN_FUNCTIONAL_STATUS_SINCE_ONSET_OF_CURRENT_ILLNESS/INJURY: NO
ADLS_ACUITY_SCORE: 18
FALL_HISTORY_WITHIN_LAST_SIX_MONTHS: NO
ORAL_HYGIENE: INDEPENDENT

## 2023-04-22 NOTE — PLAN OF CARE
"Goal Outcome Evaluation:     Plan of Care Reviewed With: patient     Pt is a 38 yr old Male admitted from [facility] to station 4A due to psychosis, paranoia and delusions.Per report and pt, he went to the ED to report a jasper by the name \"AL\" who might have been an undercover government agent and he reports that the police didn't believe him. He had an altercation with the police who then brought him to the hospital.  Pt is Diabetic Type 2. Takes metformin at home but reports not regular, \"If I eat a lot I take 4, if I don't I take 2', pt reports. He doesn't use insulin or check his BG at home. Per pt, this is his first inpatient Mental Health Hospitalization.Covid is negative. On the unit, pt was calm, cooperative during search. Is pleasant, polite, and cooperative. Speech is clear and coherent. Eye contact is good. Patient denies depression, anxiety, suicidal ideation, SIB, homicidal ideation, and auditory/visual hallucinations. He presents with a lot of  delusional thoughts but brushes them. Pt made some delusional statements, reports seeing 111, 222, 333, 1234 number plates and he thinks they are government agents. When this writer tried to gather more information pt states,\" Maybe I shouldn't have told you that. I am not sick. I just need to go home. I miss my cat and family\". Pt was given Bill of rights, oriented to unit and unit policies. BG at 0343 was  248.  Pt requested, given snacks and went to bed. No other concerns.    "

## 2023-04-22 NOTE — CONSULTS
"Consult Date: 04/22/2023    HISTORY OF PRESENT ILLNESS:  The patient is a 38-year-old male with apparently no psychiatric history, admitted station 4A after he was brought in by police to the Emergency Department at [facility] for severe paranoia.  Has paranoid delusions that someone is following him and that his life is in danger.  An Internal Medicine consultation was ordered by Dr. Thorpe to assess medical problems including poorly controlled type 2 diabetes.  At this time, Alexander is a poor historian at this time.  His speech is understandable; however, he has no insight in the current psychiatric or physical conditions at this time.  With regard to his type 2 diabetes and his severe hyperglycemia in the Emergency Department, the patient continues to state he is not concerned and states \"you guys are not taking care of my health.  I have been doing this a long time.  I have had a blood sugar up in the 800s and I felt nothing.\"  The patient frequently refuses Accu-Cheks.  Denies nausea and vomiting and other acute physical issues at this time.    PAST MEDICAL HISTORY:     1.  Psychiatric history if any per Dr. Thorpe.  2.  Type 2 diabetes, poorly controlled with hemoglobin A1c this admission 10.9.  Yesterday (4/21), his long-acting metformin was increased from 500 to 1000 mg daily. Frequently refuses Accu-Cheks prior to admission.  Apparently has been prescribed a FreeStyle Candi 2 continuous glucose monitoring, but states he does not currently have this on.  3.  History of fatty infiltration of liver, treated in the past with a statin, which he no longer is on.  4.  History of childhood asthma.    PAST SURGICAL HISTORY:  None.    Current Outpatient Medications   Medication Instructions     Continuous Blood Gluc  (FREESTYLE CANDI 2 READER) DAPHNE Use to monitor blood glucose levels     Continuous Blood Gluc Sensor (FREESTYLE CANDI 2 SENSOR) MISC Use 1 sensor every 14 days     metFORMIN (GLUCOPHAGE XR) " 500 MG 24 hr tablet On Week 1- Start taking 1 tablet by mouth daily , week 2- increase to 2 tablets daily, week 3 - increase to 3 tablets daily, week 4- increase to 4 tablets daily. Strength: 500 mg        ALLERGIES:  NO KNOWN DRUG ALLERGIES.    Social History     Socioeconomic History     Marital status: Single     Spouse name: Not on file     Number of children: Not on file     Years of education: Not on file     Highest education level: Not on file   Occupational History     Not on file   Tobacco Use     Smoking status: Never     Smokeless tobacco: Never   Vaping Use     Vaping status: Not on file   Substance and Sexual Activity     Alcohol use: No     Drug use: No     Sexual activity: Not on file   Other Topics Concern     Parent/sibling w/ CABG, MI or angioplasty before 65F 55M? Not Asked   Social History Narrative     Not on file     Social Determinants of Health     Financial Resource Strain: Not on file   Food Insecurity: Not on file   Transportation Needs: Not on file   Physical Activity: Not on file   Stress: Not on file   Social Connections: Not on file   Intimate Partner Violence: Not on file   Housing Stability: Not on file        No family history on file.       REVIEW OF SYSTEMS:  A 10-point review of systems is negative, except as stated above in history of present illness.    PHYSICAL EXAMINATION:    GENERAL:  Nontoxic-appearing young man in no acute distress.  VITAL SIGNS:  Stable, temperature afebrile, pulse in the 90s, blood pressure of 125/87.  LUNGS:  Clear.  CARDIOVASCULAR:  Regular rate and rhythm.  ABDOMEN:  Soft, nontender.  EXTREMITIES:  No edema.  SKIN:  No rash on exposed areas.  NEUROLOGIC:  He is awake, alert and oriented x3.  No acute focal deficits noted.    LABORATORY DATA:  Sodium level 135.  Glucose range since admission to 230-422.  ALT 79, AST 57.  Otherwise, CBC and rest of comprehensive medical panel unremarkable.  TSH within normal limits.  Urinalysis unremarkable.  Urine  drug screen negative.  COVID testing negative.  CT scan of head negative.    ASSESSMENT:  1.  Paranoia per Dr. Thorpe.  No obvious underlying medical cause responsible for the patient's presentation.  2.  Poorly controlled type 2 diabetes, of which the patient frequently refuses glucose monitoring.  Continues on only long-acting metformin, which his dose apparently increased yesterday from 500 to 1000 mg daily.  Currently, poor insight into possible complications from untreated hyperglycemia.    3.  History of nonalcoholic steatohepatitis (BROCK).  4.  Abnormal liver function tests, mild and likely secondary to above nonalcoholic steatohepatitis (BROCK).    5.  Mild hyponatremia, likely pseudohyponatremia from his hyperglycemia.    PLAN:  Tried on several different occasions to educate the patient on the possible complications from untreated hyperglycemia and the patient continues to refuse blood sugar checks no more than daily.  The patient is agreeable to continue metformin.  If the patient is still here in 7 days, will increase dose to 2000 mg daily.  Also continue with Novolog sliding scale as ordered. Please continue to check sugars t.i.d. before meals and at bedtime and document if the patient continues to refuse.  Discussed with endocrinology fellow, and per [place] policy, cannot use continuous glucose monitoring while inpatient. Repeat comprehensive metabolic panel today.  Medicine will continue to follow.  Please feel free to call with questions.    Thank you for this consultation.    HERMILA HARMAN PA-C        D: 2023   T: 2023   MT:     Name:     LADY GARDINER  MRN:      [id number]        Account:      [id number]   :      1985           Consult Date: 2023     Document: [id number]

## 2023-04-22 NOTE — PHARMACY-ADMISSION MEDICATION HISTORY
Please see Admission Medication History note completed on 4/18 under previous encounter for information regarding prior to admission medications.    Zainab Rosa, PharmD   *[phone]

## 2023-04-22 NOTE — PLAN OF CARE
"  Problem: Psychotic Signs/Symptoms  Goal: Optimal Cognitive Function (Psychotic Signs/Symptoms)  Outcome: Not Progressing   Goal Outcome Evaluation:         Pt. Encouraged several times to allow staff to obtain his blood glucose level.  The pt. Repeatedly refused, stating \"I never check my blood sugar.  I have the right to consent or not consent.\"                 "

## 2023-04-22 NOTE — PLAN OF CARE
Problem: Psychotic Signs/Symptoms  Goal: Optimal Cognitive Function (Psychotic Signs/Symptoms)  4/22/2023 1349 by Leona Treviño RN  Outcome: Not Progressing  4/22/2023 1123 by Leona Treviño RN  Outcome: Not Progressing  4/22/2023 0835 by Leona Treviño RN  Outcome: Not Progressing  Intervention: Support and Promote Cognitive Ability  Recent Flowsheet Documentation  Taken 4/22/2023 1119 by Leona Treviño RN  Trust Relationship/Rapport: care explained   Goal Outcome Evaluation:    Plan of Care Reviewed With: patient          Pt. Was encouraged to accept his medication again, as well as, allow nursing staff to obtain his blood sugar level.  The pt. Refused each and every time nursing attempted.  The pt. Did state that he is not angry at staff here but is angry over everyone, the government who is following him outside the hospital.  The pt. States that he wants to leave the hospital.  The pt. Exhibits no insight into his mental health issues.  The pt. Continues to exhibit paranoid thoughts and delusions.

## 2023-04-22 NOTE — ED NOTES
Writer called [facility] to give report and nurse was not available due to change of shift and writer was advised to call back in 15 mins.

## 2023-04-22 NOTE — TELEPHONE ENCOUNTER
7:14 PM  ALBERTO Alexander reports that Pt cannot go to unit at this time, that Pt is to remain in queue and be reassessed in the morning when the day on call provider can take a further review.     Pt is currently refusing blood sugar checks and the last check was above 300, leaving the Pt vulnerable to medical concerns on unit if unchecked.     Pt will be re-reviewed on days 4/22

## 2023-04-22 NOTE — ED NOTES
"Writer had time to now call [facility] back to give report and Ileana RN explained that the pts blood sugar had to be below 300. Ileana stated that the pt had to have a recent blood sugar check after night time insulin before pt could be transferred, because the unit is not a medical unit. Writer went into pts room and re-educated pt about the importance of tracking his blood sugars and giving insulin. Pt seemed apprehensive and made statements such as, \"My blood sugar was once 800 and nothing bad happened except these dots on my body\". Writer explained, that that may be, however in order for pt to get the care he needs and to get home hopefully, the pt would have to be compliant with his insulin and blood sugar checks.   "

## 2023-04-22 NOTE — ED NOTES
4:50pm- writer informed patient petition for civil commitment has been supported by the [facility], patient is now under a Court Hold.  Patient had several questions regarding commitment and how this will affect IP MH status, answered questions.  Patient asked about medications as well.  Answered patient's questions.  Gave patient a copy of the court notification document. Patient asked that his parents be notified when placement is identified and he moves to [facility].    Nori Fields, [facility]  [phone]
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
## 2023-04-22 NOTE — PROVIDER NOTIFICATION
04/22/23 0510   Patient Belongings   Did you bring any home meds/supplements to the hospital?  No   Patient Belongings locker;sent to security per site process  (Env. # [id number])   Patient Belongings Put in Hospital Secure Location (Security or Locker, etc.) cash/credit card   Belongings Search Yes   Clothing Search Yes   Second Staff ARNOLDO Frias Osman Abdi PA     (Brought in on 4/25 - new socks and underwear in package, Jeans, sweatshirt, sandals, wheat thins crackers.)  (Brought in 4/30- 1 pair blue Crocs, 1 pair gray Crocs, and 2 sleeves of crackers)  Short pants - 2  Jeans - 1  Jacket - 1  Shirts - 2  Set of keys ( car keys-2)- 1 set  Sweater - 1  Brief - 1  Toiletries- brush, comb, toothpaste  Pair of black shoes..  Iphone- 1  Wallet - 1  's license   Visa/Debit card/gift card - 4 (Sent to security)  Cash - $5.00 (Sent to security)    A               Admission:  I am responsible for any personal items that are not sent to the safe or pharmacy.  [facility] is not responsible for loss, theft or damage of any property in my possession.    Signature:  _________________________________ Date: _______  Time: _____                                              Staff Signature:  ____________________________ Date: ________  Time: _____      2nd Staff person, if patient is unable/unwilling to sign:    Signature: ________________________________ Date: ________  Time: _____     Discharge:  [facility] has returned all of my personal belongings:    Signature: _________________________________ Date: ________  Time: _____                                          Staff Signature:  ____________________________ Date: ________  Time: _____

## 2023-04-22 NOTE — PLAN OF CARE
Problem: Psychotic Signs/Symptoms  Goal: Improved Behavioral Control (Psychotic Signs/Symptoms)  Outcome: Not Progressing     Problem: Psychotic Signs/Symptoms  Goal: Optimal Cognitive Function (Psychotic Signs/Symptoms)  4/22/2023 1123 by Leona Treviño RN  Outcome: Not Progressing  4/22/2023 0835 by Leona Treviño RN  Outcome: Not Progressing  Intervention: Support and Promote Cognitive Ability  Recent Flowsheet Documentation  Taken 4/22/2023 1119 by Leona Treviño RN  Trust Relationship/Rapport: care explained     Problem: Psychotic Signs/Symptoms  Goal: Optimal Cognitive Function (Psychotic Signs/Symptoms)  Intervention: Support and Promote Cognitive Ability  Recent Flowsheet Documentation  Taken 4/22/2023 1119 by Leona Treviño RN  Trust Relationship/Rapport: care explained   Goal Outcome Evaluation:    Plan of Care Reviewed With: patient          Nursing staff is frequently engaging with pt. To encourage the pt. To allow staff to obtain a blood glucose level and to administer medications.   The pt. Refuses each time nursing staff attempts to obtain the pt.'s blood sugar  or to administer medications to the pt.  The pt. Appears to be delusional, suspicious in his thought process. The pt. repeatedly states that he should not be here, that it is because of government employees, that he is being watched, followed.  The pt. gives a long explanation of being followed by numerous individuals.  The pt. Is very clear that he is not experiencing any mental health issues.  Nursing staff will continue to assess the pt. On an emotional, physical, and cognitive status.

## 2023-04-22 NOTE — ED NOTES
This writer spoke with KEILA Washburn at [address]. Pt is currently not being accepted due to inconsistent compliance of insulin. KEILA Washburn at [place] states she spoke with ANS and a provider regarding this issue and that a morning physician will look at his chart. This writer is informing both DEC and the ED MD

## 2023-04-23 ENCOUNTER — MEDICAL CORRESPONDENCE (OUTPATIENT)
Dept: HEALTH INFORMATION MANAGEMENT | Facility: CLINIC | Age: 38
End: 2023-04-23

## 2023-04-23 LAB
ALBUMIN SERPL BCG-MCNC: 3.9 G/DL (ref 3.5–5.2)
ALP SERPL-CCNC: 89 U/L (ref 40–129)
ALT SERPL W P-5'-P-CCNC: 64 U/L (ref 10–50)
ANION GAP SERPL CALCULATED.3IONS-SCNC: 11 MMOL/L (ref 7–15)
AST SERPL W P-5'-P-CCNC: 32 U/L (ref 10–50)
BILIRUB SERPL-MCNC: 0.7 MG/DL
BUN SERPL-MCNC: 17.4 MG/DL (ref 6–20)
CALCIUM SERPL-MCNC: 9.8 MG/DL (ref 8.6–10)
CHLORIDE SERPL-SCNC: 97 MMOL/L (ref 98–107)
CREAT SERPL-MCNC: 0.95 MG/DL (ref 0.67–1.17)
DEPRECATED HCO3 PLAS-SCNC: 24 MMOL/L (ref 22–29)
GFR SERPL CREATININE-BSD FRML MDRD: >90 ML/MIN/1.73M2
GLUCOSE BLDC GLUCOMTR-MCNC: 222 MG/DL (ref 70–99)
GLUCOSE BLDC GLUCOMTR-MCNC: 233 MG/DL (ref 70–99)
GLUCOSE BLDC GLUCOMTR-MCNC: 240 MG/DL (ref 70–99)
GLUCOSE BLDC GLUCOMTR-MCNC: 275 MG/DL (ref 70–99)
GLUCOSE BLDC GLUCOMTR-MCNC: 320 MG/DL (ref 70–99)
GLUCOSE SERPL-MCNC: 282 MG/DL (ref 70–99)
POTASSIUM SERPL-SCNC: 4.1 MMOL/L (ref 3.4–5.3)
PROT SERPL-MCNC: 7.1 G/DL (ref 6.4–8.3)
SODIUM SERPL-SCNC: 132 MMOL/L (ref 136–145)

## 2023-04-23 PROCEDURE — 250N000009 HC RX 250: Performed by: NURSE PRACTITIONER

## 2023-04-23 PROCEDURE — 250N000013 HC RX MED GY IP 250 OP 250 PS 637: Performed by: PHYSICIAN ASSISTANT

## 2023-04-23 PROCEDURE — 124N000002 HC R&B MH UMMC

## 2023-04-23 PROCEDURE — 250N000013 HC RX MED GY IP 250 OP 250 PS 637: Performed by: PSYCHIATRY & NEUROLOGY

## 2023-04-23 PROCEDURE — 250N000012 HC RX MED GY IP 250 OP 636 PS 637: Performed by: PSYCHIATRY & NEUROLOGY

## 2023-04-23 PROCEDURE — 36415 COLL VENOUS BLD VENIPUNCTURE: CPT | Performed by: PSYCHIATRY & NEUROLOGY

## 2023-04-23 PROCEDURE — 80053 COMPREHEN METABOLIC PANEL: CPT | Performed by: PSYCHIATRY & NEUROLOGY

## 2023-04-23 PROCEDURE — 250N000013 HC RX MED GY IP 250 OP 250 PS 637: Performed by: NURSE PRACTITIONER

## 2023-04-23 RX ORDER — METFORMIN HCL 500 MG
2000 TABLET, EXTENDED RELEASE 24 HR ORAL
Status: DISCONTINUED | OUTPATIENT
Start: 2023-04-28 | End: 2023-04-28

## 2023-04-23 RX ORDER — METFORMIN HCL 500 MG
1000 TABLET, EXTENDED RELEASE 24 HR ORAL
Status: COMPLETED | OUTPATIENT
Start: 2023-04-23 | End: 2023-04-27

## 2023-04-23 RX ORDER — OXYMETAZOLINE HYDROCHLORIDE 0.05 G/100ML
2 SPRAY NASAL 2 TIMES DAILY PRN
Status: DISPENSED | OUTPATIENT
Start: 2023-04-23 | End: 2023-04-26

## 2023-04-23 RX ADMIN — ARIPIPRAZOLE 5 MG: 5 TABLET ORAL at 08:14

## 2023-04-23 RX ADMIN — INSULIN ASPART 2 UNITS: 100 INJECTION, SOLUTION INTRAVENOUS; SUBCUTANEOUS at 12:26

## 2023-04-23 RX ADMIN — INSULIN ASPART 2 UNITS: 100 INJECTION, SOLUTION INTRAVENOUS; SUBCUTANEOUS at 08:13

## 2023-04-23 RX ADMIN — OXYMETAZOLINE HYDROCHLORIDE 2 SPRAY: 0.05 SPRAY NASAL at 21:41

## 2023-04-23 RX ADMIN — INSULIN ASPART 4 UNITS: 100 INJECTION, SOLUTION INTRAVENOUS; SUBCUTANEOUS at 17:01

## 2023-04-23 RX ADMIN — OXYMETAZOLINE HYDROCHLORIDE 2 SPRAY: 0.05 SPRAY NASAL at 19:56

## 2023-04-23 RX ADMIN — TRAZODONE HYDROCHLORIDE 50 MG: 50 TABLET ORAL at 02:36

## 2023-04-23 RX ADMIN — METFORMIN HYDROCHLORIDE 1000 MG: 500 TABLET, EXTENDED RELEASE ORAL at 17:01

## 2023-04-23 ASSESSMENT — ACTIVITIES OF DAILY LIVING (ADL)
ADLS_ACUITY_SCORE: 18
ADLS_ACUITY_SCORE: 18
DRESS: INDEPENDENT
DRESS: INDEPENDENT
HYGIENE/GROOMING: INDEPENDENT
HYGIENE/GROOMING: INDEPENDENT
ADLS_ACUITY_SCORE: 18
ORAL_HYGIENE: INDEPENDENT
LAUNDRY: WITH SUPERVISION
ORAL_HYGIENE: INDEPENDENT
ADLS_ACUITY_SCORE: 18

## 2023-04-23 NOTE — PLAN OF CARE
Problem: Psychotic Signs/Symptoms  Goal: Improved Behavioral Control (Psychotic Signs/Symptoms)  Outcome: Progressing     Problem: Psychotic Signs/Symptoms  Goal: Optimal Cognitive Function (Psychotic Signs/Symptoms)  Outcome: Progressing  Intervention: Support and Promote Cognitive Ability  Recent Flowsheet Documentation  Taken 4/23/2023 1052 by Leona Treviño RN  Trust Relationship/Rapport: care explained   Goal Outcome Evaluation:    Plan of Care Reviewed With: patient      Pt. Willing to engage in a 1:1 with staff.  Pt. States that he is doing well overall.  Pt. Reports that he wants  to be discharged back to his home with his parents and his cats.  The pt. denies suicidal ideation, self-injurious behavior,   auditory or visual hallucinations.  The pt. States that individuals believe that he is delusional but the pt. denies any delusions.  The pt. States that an individual who states is an  may not be.  The pt. Does attempt to explain his current belief system around being followed; however it is somewhat difficult to follow.  The pt. Does appear paranoid, suspicious overall.  The pt. With encouragement accepted his blood sugar check, his morning medication, and his insulin.  The pt. Is very pleasant and cooperative.Nursing staff will continue to assess his cognitive, emotional, and physical status.

## 2023-04-23 NOTE — PROGRESS NOTES
Pt BG at 0200 was 240. Pt reported having a hard time sleeping. Prn trazodone given. Pt paranoid and worried about other pt's gong to his room .Pt was informed that staff do safety checks every 15 min. Pt appears to be sleeping 3.5 hrs. Will continue to monitor.   No roommate due to severe paranoia, delusional thoughts.
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
## 2023-04-24 LAB
GLUCOSE BLDC GLUCOMTR-MCNC: 233 MG/DL (ref 70–99)
GLUCOSE BLDC GLUCOMTR-MCNC: 243 MG/DL (ref 70–99)
GLUCOSE BLDC GLUCOMTR-MCNC: 258 MG/DL (ref 70–99)
GLUCOSE BLDC GLUCOMTR-MCNC: 285 MG/DL (ref 70–99)

## 2023-04-24 PROCEDURE — 250N000013 HC RX MED GY IP 250 OP 250 PS 637: Performed by: PHYSICIAN ASSISTANT

## 2023-04-24 PROCEDURE — 250N000013 HC RX MED GY IP 250 OP 250 PS 637: Performed by: NURSE PRACTITIONER

## 2023-04-24 PROCEDURE — 99232 SBSQ HOSP IP/OBS MODERATE 35: CPT | Performed by: NURSE PRACTITIONER

## 2023-04-24 PROCEDURE — 124N000002 HC R&B MH UMMC

## 2023-04-24 RX ADMIN — INSULIN ASPART 3 UNITS: 100 INJECTION, SOLUTION INTRAVENOUS; SUBCUTANEOUS at 17:15

## 2023-04-24 RX ADMIN — METFORMIN HYDROCHLORIDE 1000 MG: 500 TABLET, EXTENDED RELEASE ORAL at 17:15

## 2023-04-24 RX ADMIN — INSULIN ASPART 3 UNITS: 100 INJECTION, SOLUTION INTRAVENOUS; SUBCUTANEOUS at 12:23

## 2023-04-24 RX ADMIN — OXYMETAZOLINE HYDROCHLORIDE 2 SPRAY: 0.05 SPRAY NASAL at 09:50

## 2023-04-24 RX ADMIN — OXYMETAZOLINE HYDROCHLORIDE 2 SPRAY: 0.05 SPRAY NASAL at 21:53

## 2023-04-24 RX ADMIN — INSULIN ASPART 3 UNITS: 100 INJECTION, SOLUTION INTRAVENOUS; SUBCUTANEOUS at 08:20

## 2023-04-24 ASSESSMENT — ACTIVITIES OF DAILY LIVING (ADL)
ADLS_ACUITY_SCORE: 18
ADLS_ACUITY_SCORE: 18
LAUNDRY: WITH SUPERVISION
ADLS_ACUITY_SCORE: 18
ORAL_HYGIENE: INDEPENDENT
ADLS_ACUITY_SCORE: 18
ADLS_ACUITY_SCORE: 18
HYGIENE/GROOMING: INDEPENDENT
ADLS_ACUITY_SCORE: 18
DRESS: INDEPENDENT
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
## 2023-04-24 NOTE — PLAN OF CARE
"  Problem: Psychotic Signs/Symptoms  Goal: Improved Behavioral Control (Psychotic Signs/Symptoms)  Outcome: Not Progressing   Goal Outcome Evaluation:    Plan of Care Reviewed With: patientPlan of Care Reviewed With: patient       Pt is alert and oriented to person , place and time, disoriented to situation. Pt's speech is pressured and rambled, thoughts are paranoid, delusional, suspicious. Pt stated, when writer asked how his day was going? \" they think i'm paranoid schizophrenic, but i'm not, I have never had mental illness in my life. I keep telling them. government after me but no one believes me. I don't want to talk about it anymore\". Pt states a brain scan was done to proof he does not have MH illness, \"it showed normal brain,but the doctors wrote something else down, because they can write anything\". Empathetic listening provided. Writer asked Pt why he was not taking his Abilify and Pt stated \"It gives me severe headache\". Pt was informed provider will be notified. Pt denies headaches at this time.    Pt  endorsed depression r/t hospitalization, denies anxiety, SI/SIB/HI/AVH. Pt is out in the milieu, social with staff and peers. Pt is eating and drinking adequately.  Compliant with BG checks, insulin and scheduled Metformin.   B and 233. 3 and 1 units of NOV administered respectively to cover BG levels.   PRN  Afrin nasal spray at 2153        " Nsaids Pregnancy And Lactation Text: These medications are considered safe up to 30 weeks gestation. It is excreted in breast milk.
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
## 2023-04-24 NOTE — PLAN OF CARE
"  Problem: Psychotic Signs/Symptoms  Goal: Improved Mood Symptoms  Outcome: Not Progressing  Goal: Improved Sleep (Psychotic Signs/Symptoms)  Outcome: Not Progressing   Goal Outcome Evaluation:       Patient refused Abilify this morning. \"I don't need this medication. Abilify is not my medication. I took it for two days, it did nothing.\" Patient kept repeating that he didn't know how police have the right to put him here. When patient asked what he had to do to get out of here and this writer RN said for patient to follow recommendations of team and take medications. Patient then went back to how Abilify was not his medication and he wasn't going to take it. Patient also asked how he would pay for it if he does not have insurance. Patient then went to meet with  to answer his questions about insurance.   Patient's BG this AM was 243 and patient was given 3 units novolog as ordered. Before lunch BG was 253 and received 3 units.  Will continue to monitor.                 "
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
## 2023-04-24 NOTE — PLAN OF CARE
"Appointment   Court examiner- April 26th @9:00AM Preliminary to follow  Final Hearing: May 1st @ Time TBD    Tasks Complete:    Chart review     Team meeting     [facility] spoke with pt about  and the petition for commitment. Pt noted his frustrations with the [place] [employer] and rationale for being here. Pt noted that he doesn't want to take Abilify because \" I dont know what its for and im not crazy\". [facility] encouraged pt to work with his medication management team regarding his medications. PT noted that the  medication gave him a headache. CTC discussed petition and pt asked for advise regarding legal proceeding and asked \" what if I fail\" and \" maybe I shouldn't tell them my thoughts. They'll fail me\". CTC noted that pt will be assigned a  and to seek legal advise from his . [facility] also noted that pt will receive court documents within the next 24 hours regarding next steps. PT receptive. [facility] encouraged pt to go to groups as a form of distraction. Pt receptive. In addition, [facility] provided pt a copy of his insurance on file. Pt receptive.    Notice and order for custody, examination and hearing received and copies provided to pt and placed in paperchart. In addition, petition for commitment paperwork also received and copy provided to pt and placed in paperchart. Pt noted that he feels frustrated because he is not schizophrenic and that he should not be labeled as mentally ill       Current Symptoms include the following: See RN note     Addressed patient needs/concerns: See above intervention    Discharge Plan or Goal   Plan  TBD likely discharge home with outpatient services    Commitment  PT is on a court hold   Hearings TBD    Care Team   No outpatient care team   Father - Gokul Mccoy ([phone])  Civil commitment - Fátima Nunez- [phone]     Barriers to Discharge   Ongoing stabilization  Court hold      Referral Status  PT would benefit from  such as " case management, psychiatry, OP programming. No referrals have been made as of yet.      Legal Status  Court hold
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
## 2023-04-24 NOTE — PLAN OF CARE
"   04/24/23 1235   Individualization/Patient Specific Goals   Patient Personal Strengths expressive of emotions;positive educational history;positive vocational history;stable living environment   Patient Vulnerabilities family/relationship conflict;lacks insight into illness;occupational insecurity;limited support system   Anxieties, Fears or Concerns Pt notes his concerns surrounding the [facility] and rationale for court hold   Individualized Care Needs Pt would like to discharge. He is on a court hold   Interprofessional Rounds   Summary Pt admits to [address] on 4/22/2023.  He was admitted on a court hold from the [facility] ER due to psychosis. He was not taking any psychotropic medications prior to admission.  He is on a court hold. Petition for commitment supported. Pt waiting for trail. During the admission assessment, he reports rarely checking his blood glucose and states that his BG never exceeds 200, however BGs have often been over 300 since he was in the ER, which he relates to stress   Participants advanced practice nurse;CTC;nursing   Behavioral Team Discussion   Participants Provider: Savannah Pope,NP, CTC: Kasandra DRISCOLL [facility], Nursing: Zully PEDRAZA   Progress Court hold. Pt continues to make delusional and paranoid statements about \" gangsters and the FBI \" follow and tracking him and being a in a spirtual war. He is not medication compliant and lacks insight to his mental illness.   Anticipated length of stay 4-6 weeks depending on the disposition of court   Continued Stay Criteria/Rationale ongoing stabilization   Medical/Physical During the admission assessment, he reports rarely checking his blood glucose and states that his BG never exceeds 200, however BGs have often been over 300 since he was in the ER, which he relates to stress- dx with diabeties type 2   Precautions see below   Plan TBD discharge home with OP services when stable. Pt is on a " court hold   Anticipated Discharge Disposition other (see comments)     Goal Outcome Evaluation:PRECAUTIONS AND SAFETY    Behavioral Orders   Procedures    Cheeking Precautions (behavioral units)     Patient Observed swallowing PO medications; Patient asked to drink water after swallowing medication; Patient in Staff line of sight for 15 minutes after medication given; Mouth checks after PO administration (patient asked to open mouth and stick out their tongue).    Code 1 - Restrict to Unit    Elopement precautions    Petition for commitment     MI with Kristofer (requested meds Abilify, Risperdal, Haldol, Zyprexa) in [facility].    Routine Programming     As clinically indicated    Status 15     Every 15 minutes.       Safety  Safety WDL: WDL  Patient Location: [facility]  Observed Behavior: calm, sitting  Observed Behavior (Comment): calm  Safety Measures: safety rounds completed  Suicidality: Status 15  Elopement Assessment: Statements about wanting to leave  Elopement Interventions: status 15, behavioral scrubs (marcel), room away from unit doors

## 2023-04-25 LAB
GLUCOSE BLDC GLUCOMTR-MCNC: 195 MG/DL (ref 70–99)
GLUCOSE BLDC GLUCOMTR-MCNC: 234 MG/DL (ref 70–99)
GLUCOSE BLDC GLUCOMTR-MCNC: 241 MG/DL (ref 70–99)
GLUCOSE BLDC GLUCOMTR-MCNC: 267 MG/DL (ref 70–99)
GLUCOSE BLDC GLUCOMTR-MCNC: 308 MG/DL (ref 70–99)

## 2023-04-25 PROCEDURE — 90853 GROUP PSYCHOTHERAPY: CPT

## 2023-04-25 PROCEDURE — 250N000013 HC RX MED GY IP 250 OP 250 PS 637: Performed by: PSYCHIATRY & NEUROLOGY

## 2023-04-25 PROCEDURE — 250N000013 HC RX MED GY IP 250 OP 250 PS 637: Performed by: PHYSICIAN ASSISTANT

## 2023-04-25 PROCEDURE — 124N000002 HC R&B MH UMMC

## 2023-04-25 PROCEDURE — 99232 SBSQ HOSP IP/OBS MODERATE 35: CPT | Performed by: NURSE PRACTITIONER

## 2023-04-25 PROCEDURE — G0177 OPPS/PHP; TRAIN & EDUC SERV: HCPCS

## 2023-04-25 RX ADMIN — OXYMETAZOLINE HYDROCHLORIDE 2 SPRAY: 0.05 SPRAY NASAL at 19:01

## 2023-04-25 RX ADMIN — METFORMIN HYDROCHLORIDE 1000 MG: 500 TABLET, EXTENDED RELEASE ORAL at 17:10

## 2023-04-25 RX ADMIN — INSULIN ASPART 3 UNITS: 100 INJECTION, SOLUTION INTRAVENOUS; SUBCUTANEOUS at 11:59

## 2023-04-25 RX ADMIN — ACETAMINOPHEN 325MG 650 MG: 325 TABLET ORAL at 19:00

## 2023-04-25 RX ADMIN — INSULIN ASPART 4 UNITS: 100 INJECTION, SOLUTION INTRAVENOUS; SUBCUTANEOUS at 17:09

## 2023-04-25 RX ADMIN — INSULIN ASPART 3 UNITS: 100 INJECTION, SOLUTION INTRAVENOUS; SUBCUTANEOUS at 08:05

## 2023-04-25 ASSESSMENT — ACTIVITIES OF DAILY LIVING (ADL)
ADLS_ACUITY_SCORE: 18
LAUNDRY: WITH SUPERVISION
DRESS: STREET CLOTHES
ADLS_ACUITY_SCORE: 18
ADLS_ACUITY_SCORE: 18
ORAL_HYGIENE: INDEPENDENT
ADLS_ACUITY_SCORE: 18
ADLS_ACUITY_SCORE: 18
ORAL_HYGIENE: INDEPENDENT
ADLS_ACUITY_SCORE: 18
HYGIENE/GROOMING: INDEPENDENT
ADLS_ACUITY_SCORE: 18
DRESS: INDEPENDENT
HYGIENE/GROOMING: INDEPENDENT
LAUNDRY: WITH SUPERVISION

## 2023-04-25 NOTE — PLAN OF CARE
Appointment   Court exam  9:15 - 10:15 Exam  Zoom Link: Exam 1  Meeting ID:   Passcode:  [id number]    Preliminary Hearing  10:15-10:30  Zoom Link: Judge Abreu  Meeting ID:    Passcode:  [id number]    Final Hearing: May 1st @ 9:00AM TBD    Tasks Complete:    Chart review     Team meeting     [facility] sent Emani New with [facility] courts and update regarding pt's progress and requesting DA template to complete for CM services     Current Symptoms include the following: See RN note     Addressed patient needs/concerns: See above intervention    Discharge Plan or Goal   Plan  TBD likely discharge home with outpatient services    Commitment  PT is on a court hold   Hearings TBD    Care Team   No outpatient care team   Father - Gokul Mccoy ([phone])  Civil commitment - Fátima Nunez- [phone]     Barriers to Discharge   Ongoing stabilization  Court hold      Referral Status  PT would benefit from  such as case management, psychiatry, OP programming. No referrals have been made as of yet.      Legal Status  Court hold
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
## 2023-04-25 NOTE — PLAN OF CARE
Problem: Psychotic Signs/Symptoms  Goal: Optimal Cognitive Function (Psychotic Signs/Symptoms)  Outcome: Not Progressing   Goal Outcome Evaluation:    Patient continues to refuse Abilify, continues to say it gave him a headache. Patient continues to say he has no need for that medication. Patient needed several reminders that metformin is extended release and he only takes it once per day, he then says he takes 2000 mg per day. Patient was compliant with BG's today and insulin. He demonstrates limited knowledge of what metformin does and what insulin does. Patient did attend groups today.   Will continue to monitor.

## 2023-04-25 NOTE — PLAN OF CARE
"Occupational Therapy Group     04/25/23 2904   Group Therapy Session   Group Attendance attended group session   Time Session Began 1415   Time Session Ended 1500   Total Time (minutes) 45   Total # Attendees 3   Group Type recreation;community   Group Topic Covered cognitive activities;leisure exploration/use of leisure time;structured socialization   Group Session Detail OT Leisure Group: Group activities to exercise cognitive skills while exploring leisure and socializing opportunities   Patient Participation Detail Pt participated in a group activity, playing the card game Betcha Can't (listing as many items within a time limit). Pt appeared upbeat and social. Pt was able to meet summer challenges and express times they may have difficulty; pt would ask the group for help for ideas, as well as contribute answers of his own. Pt expressed gratitude, telling writer \"Thanks for challenging us\".       "

## 2023-04-25 NOTE — PROGRESS NOTES
"Pt appeared to be sleeping early in the night shift.  Pt is awake during 1:30 am rounds but remained quietly resting in bed in his darkened room.    Pt refused 2 am BG checks when prompted by writer. \"I already had 5 pokes.  I told them I didn't want to do any more at 2\".  He denies having any symptoms/concerns about his blood sugar.  Pt remains resting in bed with radio headphones and asked to have his door shut.    At 2:45 am Pt came to the unge to get ice water then approached nurse and stated that he would do his BG check.  Pt continued to ask why he needed his BG checked at 2 am and requested to not be awaken for it next time.    2 am UP=595.  He returned to rest in bed.    "

## 2023-04-25 NOTE — PLAN OF CARE
"Occupational Therapy Group     04/25/23 1324   Group Therapy Session   Group Attendance attended group session   Time Session Began 1015   Time Session Ended 1100   Total Time (minutes) 30   Total # Attendees 5   Group Type recreation;task skill   Group Topic Covered balanced lifestyle;leisure exploration/use of leisure time   Group Session Detail OT Clinic: promoting self-expression and leisure exploration while working to demonstrate cognitive skills   Patient Participation Detail Pt participated in open OT group to work on spot-the-difference activity book. When writer asked pt what they would like to work on during group, pt reported that the activities were \"too feminine\" and that pt \"had boundaries\", but expressed that they would be interested in observing group members work on projects, rather than work on any projects of their own. Writer suggested pt work on a spot-the-difference picture book. Pt asked writer to watch them work on the activity; writer instructed pt to write the differences down on a paper and independently work on activity. Pt required concrete and step-by-step instructions to complete activity. When writer checked on pt's progress, pt expressed having difficulty finding all the differences and asked writer for assistance or other activity ideas. Writer encouraged pt to find all the differences, assisting pt with finding one. Pt appeared social and upbeat, working to recruit peers to find additional differences. Pt left group on occasion, whether to speak with healthcare providers or other uncommunicated reasons.       "

## 2023-04-25 NOTE — PLAN OF CARE
"Occupational Therapy Group Discussion     04/25/23 3715   Group Therapy Session   Group Attendance attended group session   Time Session Began 1115   Time Session Ended 1200   Total Time (minutes) 30   Total # Attendees 5   Group Type life skill;psychoeducation   Group Topic Covered coping skills/lifestyle management;relationship;self-care activities   Group Session Detail General Health and Coping Skills: group discussion and education on boundaries.   Patient Participation Detail Pt participated in a group discussion on boundaries. Pt was quiet but appeared engaged and to have listened actively to peers. Pt left group, but returned and was pacing with other peers in the room, and joined peers repeating last things they heard and expressing agreement, like \"Yeah, that sounds right\".         "

## 2023-04-25 NOTE — PLAN OF CARE
Pt was awake several times tonight. And appeared to sleep 4 hours.  He was calm and appropriate when he approached staff for requests.  At 4 am Pt was noted to be quietly walking the hallway for approximately 15 minutes then returned to rest in his room. Pt remains on cheeking and elopement precautions.  No Roommate ordered for severe psycotic symptoms and disruptive behavior.

## 2023-04-25 NOTE — PLAN OF CARE
"Goal Outcome Evaluation:    Plan of Care Reviewed With: patient      Patient was visible in the milieu for several hours this shift. He was cooperative with staff and interacted appropriately with peers. Patient's affect was blunted, his mood appeared anxious at times. Patient denied all mental health symptoms and concerns stating \"I'm not sick, I'm not psychotic. I'm wrongfully here because the government was watching me\". Patient states he does not need to be on medication apart from metformin, \"All I need is metformin and I'm healthy\". Patient's blood glucose before dinner was 308 and he received 4 units of insulin. His blood glucose before bed was 241 and received 1 unit of insulin. Patient appeared to not understand diabetic education and the need to monitor his blood glucose. When RN informed patient that his blood glucose has been high he stated \"It's not that high, and really the metformin is going to make it better\". Patient's mother visited this evening and brought in snacks and slippers. No PCO in epic for these items, patient requesting to speak to provider about getting those items approved. Patient's mother also brought in a letter that she would like patient's provider and nurses to read, letter is in patient's physical chart. Patient's VSS, he endorsed 4/10 back pain and received tylenol PRN which he states was helpful. Patient was medication compliant. He received nasal spray PRN for congestion. No behavioral concerns this shift.              "

## 2023-04-26 ENCOUNTER — MEDICAL CORRESPONDENCE (OUTPATIENT)
Dept: HEALTH INFORMATION MANAGEMENT | Facility: CLINIC | Age: 38
End: 2023-04-26

## 2023-04-26 LAB
B BURGDOR IGG+IGM SER QL: 0.32
FOLATE SERPL-MCNC: 23.5 NG/ML (ref 4.6–34.8)
GLUCOSE BLDC GLUCOMTR-MCNC: 211 MG/DL (ref 70–99)
GLUCOSE BLDC GLUCOMTR-MCNC: 227 MG/DL (ref 70–99)
GLUCOSE BLDC GLUCOMTR-MCNC: 248 MG/DL (ref 70–99)
GLUCOSE BLDC GLUCOMTR-MCNC: 253 MG/DL (ref 70–99)
GLUCOSE BLDC GLUCOMTR-MCNC: 289 MG/DL (ref 70–99)
HIV 1+2 AB+HIV1 P24 AG SERPL QL IA: NONREACTIVE
HOLD SPECIMEN: NORMAL
T PALLIDUM AB SER QL: NONREACTIVE
VIT B12 SERPL-MCNC: 832 PG/ML (ref 232–1245)

## 2023-04-26 PROCEDURE — 82390 ASSAY OF CERULOPLASMIN: CPT | Performed by: NURSE PRACTITIONER

## 2023-04-26 PROCEDURE — 99232 SBSQ HOSP IP/OBS MODERATE 35: CPT | Performed by: NURSE PRACTITIONER

## 2023-04-26 PROCEDURE — 87389 HIV-1 AG W/HIV-1&-2 AB AG IA: CPT | Performed by: NURSE PRACTITIONER

## 2023-04-26 PROCEDURE — 82746 ASSAY OF FOLIC ACID SERUM: CPT | Performed by: NURSE PRACTITIONER

## 2023-04-26 PROCEDURE — 36415 COLL VENOUS BLD VENIPUNCTURE: CPT | Performed by: NURSE PRACTITIONER

## 2023-04-26 PROCEDURE — 86038 ANTINUCLEAR ANTIBODIES: CPT | Performed by: NURSE PRACTITIONER

## 2023-04-26 PROCEDURE — 124N000002 HC R&B MH UMMC

## 2023-04-26 PROCEDURE — 82607 VITAMIN B-12: CPT | Performed by: NURSE PRACTITIONER

## 2023-04-26 PROCEDURE — 250N000013 HC RX MED GY IP 250 OP 250 PS 637: Performed by: NURSE PRACTITIONER

## 2023-04-26 PROCEDURE — 86780 TREPONEMA PALLIDUM: CPT | Performed by: NURSE PRACTITIONER

## 2023-04-26 PROCEDURE — 83825 ASSAY OF MERCURY: CPT | Performed by: NURSE PRACTITIONER

## 2023-04-26 PROCEDURE — 86618 LYME DISEASE ANTIBODY: CPT | Performed by: NURSE PRACTITIONER

## 2023-04-26 PROCEDURE — 250N000013 HC RX MED GY IP 250 OP 250 PS 637: Performed by: PSYCHIATRY & NEUROLOGY

## 2023-04-26 PROCEDURE — 250N000013 HC RX MED GY IP 250 OP 250 PS 637: Performed by: PHYSICIAN ASSISTANT

## 2023-04-26 RX ORDER — BENZTROPINE MESYLATE 1 MG/1
1 TABLET ORAL 2 TIMES DAILY PRN
Status: DISCONTINUED | OUTPATIENT
Start: 2023-04-26 | End: 2023-06-06 | Stop reason: HOSPADM

## 2023-04-26 RX ORDER — MULTIVIT WITH MINERALS/LUTEIN
250 TABLET ORAL DAILY
Status: DISCONTINUED | OUTPATIENT
Start: 2023-04-26 | End: 2023-05-22

## 2023-04-26 RX ORDER — HALOPERIDOL 2 MG/1
2 TABLET ORAL EVERY EVENING
Status: DISCONTINUED | OUTPATIENT
Start: 2023-04-26 | End: 2023-05-02

## 2023-04-26 RX ADMIN — Medication 250 MG: at 13:19

## 2023-04-26 RX ADMIN — INSULIN ASPART 2 UNITS: 100 INJECTION, SOLUTION INTRAVENOUS; SUBCUTANEOUS at 08:23

## 2023-04-26 RX ADMIN — INSULIN ASPART 3 UNITS: 100 INJECTION, SOLUTION INTRAVENOUS; SUBCUTANEOUS at 17:40

## 2023-04-26 RX ADMIN — INSULIN ASPART 3 UNITS: 100 INJECTION, SOLUTION INTRAVENOUS; SUBCUTANEOUS at 12:11

## 2023-04-26 RX ADMIN — ACETAMINOPHEN 325MG 325 MG: 325 TABLET ORAL at 10:00

## 2023-04-26 RX ADMIN — ACETAMINOPHEN 325MG 325 MG: 325 TABLET ORAL at 16:31

## 2023-04-26 RX ADMIN — HALOPERIDOL 2 MG: 2 TABLET ORAL at 21:31

## 2023-04-26 RX ADMIN — METFORMIN HYDROCHLORIDE 1000 MG: 500 TABLET, EXTENDED RELEASE ORAL at 17:35

## 2023-04-26 RX ADMIN — OXYMETAZOLINE HYDROCHLORIDE 2 SPRAY: 0.05 SPRAY NASAL at 10:02

## 2023-04-26 ASSESSMENT — ACTIVITIES OF DAILY LIVING (ADL)
ADLS_ACUITY_SCORE: 18
DRESS: INDEPENDENT
ADLS_ACUITY_SCORE: 18
ORAL_HYGIENE: INDEPENDENT
HYGIENE/GROOMING: INDEPENDENT
ADLS_ACUITY_SCORE: 18
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
## 2023-04-26 NOTE — PLAN OF CARE
"Pt was awake at the start of the night shift and occasionally seen entering the kitchen for ice water or to request a snack.  He returned to his room but remained awake in bed.  At 1:45 am Pt had approached other RN to check his blood sugar then became increasingly loud and agitated in the hallway.  Writer provided 1:1 time with him in his room where Pt reported many grievances in his disorganized ranting.    \"I don't have mental illness\".  Pt insists that he is not 'psychotic' and not having any a/v hallucinations. Pt denied having paranoid delusions.  He does describe having paranoid symptoms when he used meth for 2 weeks in the past.  He stated that his mother visited this past evening and that he is concerned for her because she looked so fearful and worried for him when she was here.  He complained about being victimized by the government and the court process.  Pt stated that when he went to the police to report his concerns about people he felt were gang affiliates, instead of helping him the police sent him to the ED where the police and hospital staff conspired to \"trap\" him.  Pt said he is angry thinking he may be here for months and 'maybe years'.  He contends that the government wants to make money by keeping him hospitalized.  After writer reassured him that he would not be here for 'years' Pt appeared calmer.    He is resistive to taking medications when offered, not even Benadryl.  Pt comments that when he was given Abilify he felt \"shocks\" in his brain.  When prompted Pt could not identify any medications that he felt comfortable taking.  He replied \"Maybe THC gummies\".  Pt says he was serious about THC being more beneficial to him than anything ordered by then doctor.  He talked about wanting to have privileges to 'go outside for fresh air and smoke'.  He stated that his room is blowing dry air and that he may need to sleep in the lounge.  Pt then asked to have a  meet with him as he felt he " "may need spiritual guidance.  He also asked if it would be possible to have his mother bring in  food.  Pt complained about the hospital food but then moments later made requests to have a snack from the kitchen.   Pt continues to refuse offering of medications for anxiety and insomnia.  He did eventually turn off his room light and appeared to be sleeping at 3:30 am.  Approximately 3 hours of sleep is recorded tonight.  He is awake at 5:40 am and approached the desk to request a snack while commenting \"My stomach is aching\".  Pt does appear calmer.    "

## 2023-04-26 NOTE — PLAN OF CARE
"  Problem: Psychotic Signs/Symptoms  Goal: Improved Behavioral Control (Psychotic Signs/Symptoms)  Outcome: Progressing   Goal Outcome Evaluation:     Pt visible in milieu most evening interacting with peers and watching TV and listening to music, he attended group, he reports c/o lower back 5/10, Tylenol 325mg prn given per pt request and pt repots being effective and rated pain 1/10 on pain reassessment, meal time , pt endorsed anxiety, he related his anxiety to current situation of hospitalization, denied depression, SI/HI/SIB, visual and auditory hallucinations, he continued reporting that he was wrongly diagnosed, reports he's not mentally sick, never killed or harm anyone, he contracted to safety. Later pt c/o ringing in his right ear, recalls in the past and reports having sinus infection that caused him ear infection too. Sticky note left for provider to follow up. HS  insulin administered per  Sliding scale orders, refused haloperidol saying \"he's not what we think he's.\" Education provided and pt changed mind and took med.   "

## 2023-04-26 NOTE — PLAN OF CARE
"At approximately 0135, the patient requested to have his 0200 BG check done \"early\" because he was awake. At 0140, BG was 211 mg/dL. The patient proceeded to become increasingly agitated and raising his voice. He voiced that he \"shouldn't be here\" and stated he is angry because he \"has to have court and then wait 3 more days before [he] can leave. And that's too long!! Because I shouldn't be here!\". Redirection and support was offered but minimally effective. See co-nurse's note for additional details.  "

## 2023-04-26 NOTE — PLAN OF CARE
Occupational Therapy Group Note:       04/26/23 1443   Group Therapy Session   Group Attendance attended group session   Time Session Began 1015   Time Session Ended 1100   Total Time (minutes) 5 (no charge)   Total # Attendees 3   Group Type task skill   Group Topic Covered coping skills/lifestyle management;emotions/expression;structured socialization   Group Session Detail OT Window Cling Artistic Activity   Patient Response/Contribution refused to participate   Patient Participation Detail Patient entered group room inquiring about what was going on. Writer offered patient the opportunity to engage in a window cling task alongside peers; patient declined. Various other activities (word search, paint by number, cognitive worksheets) were offered; however, patient politely declined. Patient appeared potentially interested in working on some sort of activity with peers as opposed to independently; however, peers at this time were working on individual activities. Patient left group room. Patient was noted to be pacing intermittently outside group room entrance. Patient was calm and polite in interactions.

## 2023-04-26 NOTE — PROGRESS NOTES
[facility],  Psychiatric Progress Note      Impression:     Alexander Mccoy is a 38-year-old male admitted to [address] on 4/22/2023.  He was admitted on a court hold from the [facility] ER due to psychosis.  He stated that he went to a police station because he believed undercover governmental agents were tracking him.  He said he no longer has a job because the government agents had infiltrated through his phone and computer.  He became involved in an altercation with the  at the [facility].  UTOX was negative and he denies substance abuse.  He was not taking any psychotropic medications prior to admission.  While in the ER, he was intermittently agitated.  He called 911.  During his time in the ER, a petition for commitment MI with [employer] was filed in [facility], and he was placed on a court hold.  He has type 2 diabetes.  HbA1c was 10.9.  He reports taking Metformin XR prior to admission.  While in the ER, he was inconsistently adherent to insulin and BG checks.  During the admission assessment, he reports rarely checking his blood glucose and states that his BG never exceeds 200, however BGs have often been over 300 since he was in the ER, which he relates to stress.  He says if he were not in the hospital, they would be lower.   He was not taking any psychotropic medications prior to admission.  Abilify was initiated in the ER, but he refused the second dose.  He states he will not take psychotropic medications nor consent to labs or insulin while hospitalized.  Throughout the conversation, he rarely responded directly to provider's inquiries and instead made comments about being tracked and the injustices of being hospitalized.  Since admission, Abilify was increased, and he has refused 3/4 doses.  Abilify was then discontinued and replaced with Haldol.  PRNs of Trazodone, Hydroxyzine and Zyprexa were  initiated.  He initially refused BG checks and insulin but has been adhering since the evening of 4/22 onward.  He has been intermittently irritable and continues to have paranoid delusions and poor insight.  He has been sleeping poorly.          Diagnoses:     Unspecified psychosis (differentials include schizophrenia spectrum disorder and bipolar disorder)  Type 2 diabetes, insulin dependent         Plan:     Medications:  He states he is not willing to take psychotropic medications.  Since he has made numerous comments about side effects Abilify when he took it, and Risperdal and Zyprexa are likely to increase BG, will initiate Haldol 2 mg at HS, though he indicates intent to refuse it.   PRNs of Hydroxyzine, Zyprexa and Trazodone are available.      A petition for commitment MI with Kristofer (requested meds Abilify, Risperdal, Haldol, Zyprexa) was filed in [facility], and he is on a court hold.  Preliminary hearing is 4/26 at 0900.  Final hearing is 5/1.      Internal medicine continuing to follow for diabetes management.    First episode psychosis labs ordered 4/26.      He lives with his parents.  Disposition to be determined pending the outcome of court.  He does not have outpatient providers.         Attestation:  Patient has been seen and evaluated by me, CLARA Anthony CNP  The patient was counseled on nature of illness and treatment plan/options  Care was coordinated with treatment team   Total time > 35 minutes        Clinical Global Impressions  First:  Considering your total clinical experience with this particular patient population, how severe are the patient's symptoms at this time?: 7 (04/22/23 0916)  Compared to the patient's condition at the START of treatment, this patient's condition is: 4 (04/22/23 0916)  Most recent:  Considering your total clinical experience with this particular patient population, how severe are the patient's symptoms at this time?: 7 (04/22/23  "0916)  Compared to the patient's condition at the START of treatment, this patient's condition is: 4 (04/22/23 0916)           Interim History:     The patient's care was discussed with the treatment team and chart notes were reviewed.  Pt was documented as sleeping 3 hours during the overnight shift, awaking talking to overnight staff about the perceived injustices of being hospitalized.   He refused Abilify yesterday.  He took PRNs of Afrin and Tylenol.  He has been allowing BG checks and insulin administration.  BGs have been in the low 200's - high 300's.  He was in and out of 3 groups yesterday.  States he liked the groups.  His parents visited and left a letter indicating he has had paranoid delusions for 1 year, he was inconsistently adherent to Metformin, he made comments about his mother and others poisoning him, he exhibited \"temper tantrums,\" and he was not attending to ADLs.  Pt reports he has been showering and brushing his teeth regularly on the unit.  Pt continues to perseverate on his desire to be discharged and states he is not psychotic and hospitalized unjustly.  He states that the government has followed and tracked him.  He said that Abilify caused sharp pains in his head and itchy, burning eyes after he took it.  He said that Abilify \"almost melted my brain.\" Provider informed him this can be replaced with Haldol, another antipsychotic, and he stated he will not take it.  Discussed that the treatment team is pursuing a court order to administer IM if he does not take PO.  He said, \"I'm not psychotic so you guys are trying to kill me.\"  States he has had difficulty sleeping due to 0200 BG checks and \"because I know I'm innocent and I have no mental illness. \"   He describes his mood as \"angry related to the situation.\"  He did agree to labs for first episode psychosis.           Medications:     Current Facility-Administered Medications   Medication     acetaminophen (TYLENOL) tablet 650 mg     " alum & mag hydroxide-simethicone (MAALOX) suspension 30 mL     benztropine (COGENTIN) tablet 1 mg     glucose gel 15-30 g    Or     dextrose 50 % injection 25-50 mL    Or     glucagon injection 1 mg     haloperidol (HALDOL) tablet 2 mg     hydrOXYzine (ATARAX) tablet 25 mg     insulin aspart (NovoLOG) injection (RAPID ACTING)     insulin aspart (NovoLOG) injection (RAPID ACTING)     metFORMIN (GLUCOPHAGE XR) 24 hr tablet 1,000 mg     [START ON 4/28/2023] metFORMIN (GLUCOPHAGE XR) 24 hr tablet 2,000 mg     OLANZapine (zyPREXA) tablet 10 mg    Or     OLANZapine (zyPREXA) injection 10 mg     oxymetazoline (AFRIN) 0.05 % spray 2 spray     senna-docusate (SENOKOT-S/PERICOLACE) 8.6-50 MG per tablet 1 tablet     traZODone (DESYREL) tablet 50 mg             Allergies:   No Known Allergies         Psychiatric Examination:     /82   Pulse 83   Temp 98.5  F (36.9  C) (Oral)   Resp 16   Wt 88 kg (194 lb)   SpO2 97%   BMI 32.28 kg/m      Appearance:  awake, alert, disheveled, dressed in scrubs  Attitude:  cooperative  Eye Contact:  fair  Mood:  angry  Affect:  intensity is mildly heightened  Speech:  clear, coherent, interrupting, rambling, normal volume  Psychomotor Behavior:  no evidence of tardive dyskinesia, dystonia, or tics, mild psychomotor agitation  Thought Process:  illogical  Associations:  no loose associations  Thought Content:  denies suicidal and homicidal ideation, paranoid delusions are present, denies hallucinations  Insight:  poor  Judgment:  limited  Oriented to:  date, time, person, and place  Attention Span and Concentration:  distractible  Recent and Remote Memory:  fair  Language:  intact, fluent English  Fund of Knowledge:  appropriate  Muscle Strength and Tone:  normal  Gait and Station:  normal          Labs:     Recent Results (from the past 24 hour(s))   Glucose by meter    Collection Time: 04/25/23 11:57 AM   Result Value Ref Range    GLUCOSE BY METER POCT 234 (H) 70 - 99 mg/dL   Glucose  by meter    Collection Time: 04/25/23  5:02 PM   Result Value Ref Range    GLUCOSE BY METER POCT 308 (H) 70 - 99 mg/dL   Glucose by meter    Collection Time: 04/25/23  9:38 PM   Result Value Ref Range    GLUCOSE BY METER POCT 241 (H) 70 - 99 mg/dL   Glucose by meter    Collection Time: 04/26/23  1:42 AM   Result Value Ref Range    GLUCOSE BY METER POCT 211 (H) 70 - 99 mg/dL   Glucose by meter    Collection Time: 04/26/23  7:43 AM   Result Value Ref Range    GLUCOSE BY METER POCT 227 (H) 70 - 99 mg/dL

## 2023-04-26 NOTE — PLAN OF CARE
"Appointment   Court exam  9:15 - 10:15 Exam  Zoom Link: Exam 1  Meeting ID:   Passcode:  [id number]    Preliminary Hearing  10:15-10:30  Zoom Link: Judge Abreu  Meeting ID:    Passcode:  [id number]    Final Hearing: May 1st @ 9:00AM TBD    Tasks Complete:    Chart review     Team meeting     [facility] spoke with [facility] attorney Bee who is covering for her colleague Shayy. [facility] provided updates to .    CTC prepped patient for upcoming court exam and hearing. Pt noted that he is not mentally ill and that \" they are taking what I am saying wrong\". Pt had not questions or concerns regarding court process and was provided his  phone number.    Order for continuing hold received . Copy provided to pt and copy placed in paperchart.     Continuing hold sent to UR    Current Symptoms include the following: See RN note     Addressed patient needs/concerns: See above intervention    Discharge Plan or Goal   Plan  TBD likely discharge home with outpatient services    Commitment  PT is on a court hold   Hearings TBD    Care Team   No outpatient care team   Father - Gokul Mccoy ([phone])  Civil commitment - Fátima Nunez- [phone]     Barriers to Discharge   Ongoing stabilization  Court hold      Referral Status  PT would benefit from  such as case management, psychiatry, OP programming. No referrals have been made as of yet.      Legal Status  Court hold       "

## 2023-04-27 LAB
ANA SER QL IF: NEGATIVE
CERULOPLASMIN SERPL-MCNC: 24 MG/DL (ref 20–60)
GLUCOSE BLDC GLUCOMTR-MCNC: 189 MG/DL (ref 70–99)
GLUCOSE BLDC GLUCOMTR-MCNC: 204 MG/DL (ref 70–99)
GLUCOSE BLDC GLUCOMTR-MCNC: 209 MG/DL (ref 70–99)
GLUCOSE BLDC GLUCOMTR-MCNC: 242 MG/DL (ref 70–99)
GLUCOSE BLDC GLUCOMTR-MCNC: 259 MG/DL (ref 70–99)

## 2023-04-27 PROCEDURE — 90853 GROUP PSYCHOTHERAPY: CPT

## 2023-04-27 PROCEDURE — 124N000002 HC R&B MH UMMC

## 2023-04-27 PROCEDURE — 99232 SBSQ HOSP IP/OBS MODERATE 35: CPT | Performed by: NURSE PRACTITIONER

## 2023-04-27 PROCEDURE — 250N000013 HC RX MED GY IP 250 OP 250 PS 637: Performed by: PHYSICIAN ASSISTANT

## 2023-04-27 PROCEDURE — 250N000013 HC RX MED GY IP 250 OP 250 PS 637: Performed by: PSYCHIATRY & NEUROLOGY

## 2023-04-27 PROCEDURE — H2032 ACTIVITY THERAPY, PER 15 MIN: HCPCS

## 2023-04-27 PROCEDURE — 250N000013 HC RX MED GY IP 250 OP 250 PS 637: Performed by: NURSE PRACTITIONER

## 2023-04-27 RX ORDER — IBUPROFEN 600 MG/1
600 TABLET, FILM COATED ORAL EVERY 6 HOURS PRN
Status: DISCONTINUED | OUTPATIENT
Start: 2023-04-27 | End: 2023-06-06 | Stop reason: HOSPADM

## 2023-04-27 RX ORDER — FLUTICASONE PROPIONATE 50 MCG
1 SPRAY, SUSPENSION (ML) NASAL 2 TIMES DAILY
Status: DISCONTINUED | OUTPATIENT
Start: 2023-04-27 | End: 2023-06-05

## 2023-04-27 RX ADMIN — Medication 250 MG: at 08:37

## 2023-04-27 RX ADMIN — FLUTICASONE PROPIONATE 1 SPRAY: 50 SPRAY, METERED NASAL at 20:40

## 2023-04-27 RX ADMIN — INSULIN ASPART 2 UNITS: 100 INJECTION, SOLUTION INTRAVENOUS; SUBCUTANEOUS at 12:17

## 2023-04-27 RX ADMIN — INSULIN ASPART 3 UNITS: 100 INJECTION, SOLUTION INTRAVENOUS; SUBCUTANEOUS at 17:27

## 2023-04-27 RX ADMIN — ACETAMINOPHEN 325MG 650 MG: 325 TABLET ORAL at 00:04

## 2023-04-27 RX ADMIN — IBUPROFEN 600 MG: 600 TABLET ORAL at 09:57

## 2023-04-27 RX ADMIN — INSULIN ASPART 2 UNITS: 100 INJECTION, SOLUTION INTRAVENOUS; SUBCUTANEOUS at 08:38

## 2023-04-27 RX ADMIN — METFORMIN HYDROCHLORIDE 1000 MG: 500 TABLET, EXTENDED RELEASE ORAL at 17:27

## 2023-04-27 RX ADMIN — IBUPROFEN 600 MG: 600 TABLET ORAL at 20:41

## 2023-04-27 RX ADMIN — FLUTICASONE PROPIONATE 1 SPRAY: 50 SPRAY, METERED NASAL at 10:36

## 2023-04-27 RX ADMIN — TRAZODONE HYDROCHLORIDE 50 MG: 50 TABLET ORAL at 22:05

## 2023-04-27 ASSESSMENT — ACTIVITIES OF DAILY LIVING (ADL)
ADLS_ACUITY_SCORE: 18
ORAL_HYGIENE: INDEPENDENT
ADLS_ACUITY_SCORE: 18
ADLS_ACUITY_SCORE: 18
HYGIENE/GROOMING: INDEPENDENT
ADLS_ACUITY_SCORE: 18

## 2023-04-27 NOTE — PLAN OF CARE
"  Problem: Psychotic Signs/Symptoms  Goal: Optimal Cognitive Function (Psychotic Signs/Symptoms)  Intervention: Support and Promote Cognitive Ability  Recent Flowsheet Documentation  Taken 4/27/2023 1800 by Tracy Mauro RN  Trust Relationship/Rapport:    care explained    choices provided    emotional support provided    empathic listening provided    questions answered    questions encouraged    reassurance provided    thoughts/feelings acknowledged   Goal Outcome Evaluation:  Patient was initially irritable but later cooperative and pleasant.  BG at 1700 was 242 - \"it's that high because I ate a big lunch - somebody else gave me food off their tray.\"   HS BG was 259 - patient stated that couldn't be right and the people who are following him had tampered with the system. He continued to state people are following him and have bad intentions toward him, but denied all mental health problems and said \"those nurses at [facility] who said I was hallucinating should lose their jobs\".  He reported back pain and asked for a chiropractic consultation while hospitalized and a steroid injection but then changed his mind - \"I don't want anyone injecting anything in my spine.\"  He denied all other physical problems.  Patient continues to exhibit flight of ideas, grandiosity, and disorganized thinking.  He stated he does not want 0200 BG check, will refuse every time if awakened for it, and asked that I convey this message to provider and night staff. At HS he refused Haldol - \"it makes my head hurt and I get too sleepy, same with Abilify.\"  When it was suggested he talk to provider about another neuroleptic he replied that it would be the same with all of them and he didn't need anything for his thoughts.  Possible consequences of medication refusal while in the commitment process were explained to him but he denied that the court has any power over him.  ./81 (BP Location: Right arm)   Pulse 90   Temp 97.6  F (36.4 "  C) (Temporal)   Resp 15   Wt 86.7 kg (191 lb 1.6 oz)   SpO2 99%   BMI 31.80 kg/m

## 2023-04-27 NOTE — PLAN OF CARE
"Goal Outcome Evaluation:    Plan of Care Reviewed With: patient        Pt continues to report that he has no idea why he is hospitalized. Denies that he has Mental illness.  Denies depression, anxiety or SI. Pt reports that the hospital [facility] \"made stuff up about me.\" Pt disorganized and rambles with some pressured speech at times. Is however, cooperative with peers and staff. Likes groups and unit functions. Spent a lot of time with the therapy dog this afternoon.    Lacks insight into his diabetes and is not interested in education but is open to dietician consult and help to 'get the food and protein I need\" . Pt seen by Dietician this afternoon. Compliant with blood glucose checks and insulin. Today's blood sugar checks were 209 and 204.    No complaints of ringing in the ears today. Some chronic back pain relieved by Ibuprofen.Will monitor.           "

## 2023-04-27 NOTE — PROGRESS NOTES
[facility],  Psychiatric Progress Note      Impression:     Alexander Mccoy is a 38-year-old male admitted to [address] on 4/22/2023.  He was admitted on a court hold from the [facility] ER due to psychosis.  He stated that he went to a police station because he believed undercover governmental agents were tracking him.  He said he no longer has a job because the government agents had infiltrated through his phone and computer.  He became involved in an altercation with the  at the [facility].  UTOX was negative and he denies substance abuse.  He was not taking any psychotropic medications prior to admission.  While in the ER, he was intermittently agitated.  He called 911.  During his time in the ER, a petition for commitment MI with [employer] was filed in [facility], and he was placed on a court hold.  He has type 2 diabetes.  HbA1c was 10.9.  He reports taking Metformin XR prior to admission.  While in the ER, he was inconsistently adherent to insulin and BG checks.  During the admission assessment, he reports rarely checking his blood glucose and states that his BG never exceeds 200, however BGs have often been over 300 since he was in the ER, which he relates to stress.  He says if he were not in the hospital, they would be lower.   He was not taking any psychotropic medications prior to admission.  Abilify was initiated in the ER, but he refused the second dose.  He states he will not take psychotropic medications nor consent to labs or insulin while hospitalized.  Throughout the conversation, he rarely responded directly to provider's inquiries and instead made comments about being tracked and the injustices of being hospitalized.  Since admission, Abilify was increased, and he has refused 3/4 doses.  Abilify was then discontinued and replaced with Haldol.  PRNs of Trazodone, Hydroxyzine and Zyprexa were  initiated.  He initially refused BG checks and insulin but has been adhering since the evening of 4/22 onward.  He has been calmer and less irritable.  He continues to have paranoid delusions and poor insight.  He has been sleeping poorly.          Diagnoses:     Unspecified psychosis (differentials include schizophrenia spectrum disorder and bipolar disorder)  Type 2 diabetes, insulin dependent         Plan:     Medications:  Continue Haldol 2 mg at HS, though he states intent to refuse it.  PRNs of Hydroxyzine, Zyprexa and Trazodone are available.  Ordered Flonase as well as PRNs of Ibuprofen and Ocean Spray.    A petition for commitment MI with [employer]ofer (requested meds Abilify, Risperdal, Haldol, Zyprexa) was filed in [facility], and he is on a court hold.  Preliminary hearing was 4/26.  Final hearing is 5/1.      Nutrition consult ordered.     Internal medicine continuing to follow for diabetes management.    First episode psychosis labs pending.     He lives with his parents.  Disposition to be determined pending the outcome of court.  He does not have outpatient providers. Plan for ENT referral for tinnitus.        Attestation:  Patient has been seen and evaluated by me, CLARA Anthony CNP  The patient was counseled on nature of illness and treatment plan/options  Care was coordinated with treatment team   Total time > 35 minutes        Clinical Global Impressions  First:  Considering your total clinical experience with this particular patient population, how severe are the patient's symptoms at this time?: 7 (04/22/23 0916)  Compared to the patient's condition at the START of treatment, this patient's condition is: 4 (04/22/23 0916)  Most recent:  Considering your total clinical experience with this particular patient population, how severe are the patient's symptoms at this time?: 7 (04/22/23 0916)  Compared to the patient's condition at the START of treatment, this patient's condition is: 4  "(04/22/23 0916)           Interim History:     The patient's care was discussed with the treatment team and chart notes were reviewed.  Pt was documented as sleeping 3.75 hours during the overnight shift.  He attended 1 partial group and spent time playing games on the [employer].  Pt has been allowing BG checks and taking insulin.  BGs have been in the high 100's - high 200's.  Pt reports congestion as well as sinus pain/pressure and tinnitus, all of which have been ongoing for years.  States he usually takes Flonase which is helpful for nasal symptoms.  Ordered Flonase and PRN Ocean Spray.  Informed him he will need to follow up outpatient with ENT for tinnitus after discharge.  Pt also reports back pain.  Ordered PRN Ibuprofen.  Pt asked for a nutrition consult so he can receive more protein.  Pt did take Haldol last night.  He said he felt drowsy and then experienced a mild headache, so he doesn't want to take it.  Pt reports he feels calmer now that he knows he will not be hospitalized for \"years.\"  He said he doesn't have any fear of being harmed.  When asked about the government tracking him and interfering in his life, he said, \"I don't want to talk about it anymore.\"  Pt complains of noisy vents in his room.  He has tried a sound machine and ear plugs.  Pt asked to move to a room farther down the gonzales, but none are currently available.           Medications:     Current Facility-Administered Medications   Medication     acetaminophen (TYLENOL) tablet 650 mg     alum & mag hydroxide-simethicone (MAALOX) suspension 30 mL     benztropine (COGENTIN) tablet 1 mg     glucose gel 15-30 g    Or     dextrose 50 % injection 25-50 mL    Or     glucagon injection 1 mg     fluticasone (FLONASE) 50 MCG/ACT spray 1 spray     haloperidol (HALDOL) tablet 2 mg     hydrOXYzine (ATARAX) tablet 25 mg     ibuprofen (ADVIL/MOTRIN) tablet 600 mg     insulin aspart (NovoLOG) injection (RAPID ACTING)     insulin aspart (NovoLOG) injection " "(RAPID ACTING)     metFORMIN (GLUCOPHAGE XR) 24 hr tablet 1,000 mg     [START ON 4/28/2023] metFORMIN (GLUCOPHAGE XR) 24 hr tablet 2,000 mg     OLANZapine (zyPREXA) tablet 10 mg    Or     OLANZapine (zyPREXA) injection 10 mg     senna-docusate (SENOKOT-S/PERICOLACE) 8.6-50 MG per tablet 1 tablet     sodium chloride (OCEAN) 0.65 % nasal spray 1 spray     traZODone (DESYREL) tablet 50 mg     vitamin C (ASCORBIC ACID) tablet 250 mg             Allergies:   No Known Allergies         Psychiatric Examination:     BP (!) 128/91 (BP Location: Right arm)   Pulse 98   Temp 97  F (36.1  C) (Oral)   Resp 18   Wt 88 kg (194 lb)   SpO2 97%   BMI 32.28 kg/m      Appearance:  awake, alert, fair grooming, dressed in scrubs  Attitude:  cooperative  Eye Contact:  fair  Mood:  \"good\"  Affect:  normal range  Speech:  clear, coherent, interrupting, rambling, normal volume  Psychomotor Behavior:  no evidence of tardive dyskinesia, dystonia, or tics  Thought Process:  illogical  Associations:  no loose associations  Thought Content:  denies suicidal and homicidal ideation, paranoid delusions are present, denies hallucinations  Insight:  poor  Judgment:  limited  Oriented to:  date, time, person, and place  Attention Span and Concentration:  fair  Recent and Remote Memory:  fair  Language:  intact, fluent English  Fund of Knowledge:  appropriate  Muscle Strength and Tone:  normal  Gait and Station:  normal          Labs:     Recent Results (from the past 24 hour(s))   Extra Purple Top Tube    Collection Time: 04/26/23 11:23 AM   Result Value Ref Range    Hold Specimen JI    Glucose by meter    Collection Time: 04/26/23 12:03 PM   Result Value Ref Range    GLUCOSE BY METER POCT 253 (H) 70 - 99 mg/dL   Glucose by meter    Collection Time: 04/26/23  5:31 PM   Result Value Ref Range    GLUCOSE BY METER POCT 248 (H) 70 - 99 mg/dL   Glucose by meter    Collection Time: 04/26/23  9:22 PM   Result Value Ref Range    GLUCOSE BY METER POCT " 289 (H) 70 - 99 mg/dL   Glucose by meter    Collection Time: 04/27/23  1:48 AM   Result Value Ref Range    GLUCOSE BY METER POCT 189 (H) 70 - 99 mg/dL   Glucose by meter    Collection Time: 04/27/23  8:32 AM   Result Value Ref Range    GLUCOSE BY METER POCT 209 (H) 70 - 99 mg/dL

## 2023-04-27 NOTE — PROGRESS NOTES
Pt appears to be sleeping 3.75 hrs. Pt is on status 15 min checks. Pt awake through out the night. Prn sleep medication offered, pt became argumentative and refused. Pt c/o of back pain. Prn tylenol given with minimal relief. Will continue to monitor.    Blocked bed for severe paranoia, delusional thoughts.

## 2023-04-27 NOTE — PLAN OF CARE
04/27/23 1535   Group Therapy Session   Group Attendance attended group session   Time Session Began 0215   Time Session Ended 0315   Total Time (minutes) 60   Total # Attendees 5   Group Type psychoeducation   Group Topic Covered coping skills/lifestyle management;other (see comments);problem-solving;cognitive therapy techniques   Group Session Detail DBT Group- Distress Tolerance- RESISTT Technique- RESISTT is a set of 7 techniques to help people deal with overwhelming emotions- Reframing the situation, Engaging in a distracting activity, Someone else ( refocus), Intense sensations, Shut it out, Thoughts ( Grounding), and Take a break.   Patient Response/Contribution cooperative with task;listened actively;offered helpful suggestions to peers   Patient Participation Detail Patient Actvely Participated in the group discussion. He provided feedback to peers about reframing statements but his statements were somewhat disorganized. He appeared calm and cooperative.

## 2023-04-27 NOTE — PLAN OF CARE
Appointment   Court exam  9:15 - 10:15 Exam  Zoom Link: Exam 1  Meeting ID:   Passcode:  [id number]    Preliminary Hearing  10:15-10:30  Zoom Link: Judge Abreu  Meeting ID:    Passcode:  [id number]    Final Hearing: May 1st @ 9:00AM TBD    Tasks Complete:    Chart review     Team meeting       Current Symptoms include the following: See RN note     Addressed patient needs/concerns: See above intervention    Discharge Plan or Goal   Plan  TBD likely discharge home with outpatient services    Commitment  PT is on a court hold   Hearings TBD    Care Team   No outpatient care team   Father - Gokul Mccoy ([phone])  Civil commitment - Fátima Nunez- [phone]     Barriers to Discharge   Ongoing stabilization  Court hold      Referral Status  PT would benefit from  such as case management, psychiatry, OP programming. No referrals have been made as of yet.      Legal Status  Court hold

## 2023-04-27 NOTE — PLAN OF CARE
"Occupational Therapy Group Note:       04/27/23 1538   Group Therapy Session   Group Attendance attended group session   Time Session Began 1115   Time Session Ended 1200   Total Time (minutes) 30 (no charge)   Total # Attendees 5   Group Type recreation   Group Topic Covered leisure exploration/use of leisure time;structured socialization   Group Session Detail OT Leisure Group   Patient Response/Contribution refused to participate   Patient Participation Detail Patient entered group late. Writer offered and encouraged patient to engage in group activity: Abhay card game. Patient reported that he did not know how to play; writer and peers offered to teach the game; patient declined. Patient reported, \"it looks like too much thinking.\" Patient was quick to deny engagement in group activity despite encouragement. Patient inquired if there were any crosswords or \"quizzes\" to work on in order to challenge himself. Writer offered patient a crossword puzzle per patient request; however, patient reported, \"this is too hard.\" Writer offered patient a word-search in which patient accepted; although, patient did not appear to make much progress on worksheet whilst in group. Patient appeared to have only fair attention to task. Patient became perseverative on discharge after hearing a peer discuss his pending discharge. Patient appeared to get irritable regarding his situation and inquired to peer \"why do you get to leave, and not me.\" Patient seems to have limited understanding/insight into mental health and situation. Writer needed to intervene and patient was redirectable. Patient presents as generally calm and cooperative; occasional pressured/tangential/delusional speech.         "

## 2023-04-27 NOTE — PROGRESS NOTES
"  CLINICAL NUTRITION SERVICES - BRIEF NOTE     Nutrition Prescription    Recommendations already ordered by Registered Dietitian (RD):  -Please send double portions meat (even sides like foster, sausage, etc)   -Please send rice and chicken broth once daily with dinner  -Ensure max (rotate flavors) once daily with breakfast      REASON FOR ASSESSMENT  Alexander Mccoy is a/an 38 year old male assessed by the dietitian for Patient/Family Request: Patient has diabetes.  He would like more protein and fewer carbs.  He prefers Asian food.    EVALUATION OF THE PROGRESS TOWARD GOALS   Diet: Regular  Intake: 100% per I/Os      FINDINGS   Visited with patient on the unit. He has limited insight into his diabetes, frequently making comments about how his diabetes is well managed at home and his blood sugar has been in the 800s before and he felt great. Informed patient that his A1C upon admission (10.9) was elevated indicating poorly controlled diabetes, and that his blood sugars have been quite high. He would also make comments about how he needs protein to \"use up\" the carbohydrates, we discussed that while protein can help moderate blood sugar spikes it does not use up the carbohydrates. He made similar comments about exercise. He also improperly said that rice is a carbohydrate and was scared that eating meat would raise his blood sugar. We discussed briefly sources of carbohydrates and protein, but patient will likely require consistent reinforcement/re-education re his diabetes.     He was amenable to the above interventions.    Will plan to monitor patient for discharge and re-educate closer to discharge.     Mojgan Weir, MPH, RD, LD  Pediatric & Adult Behavioral Health Dietitian   Pager: [phone]  Weekend/holiday pager: [phone]        "

## 2023-04-27 NOTE — PLAN OF CARE
Occupational Therapy Group Note:     04/27/23 1704   Group Therapy Session   Group Attendance attended group session   Time Session Began 1350   Time Session Ended 1410   Total Time (minutes) 20 (no charge)   Total # Attendees 5   Group Type psychoeducation   Group Topic Covered balanced lifestyle   Group Session Detail OT Topic Group: Goals   Patient Response/Contribution cooperative with task;listened actively   Patient Participation Detail Verbal topic group discussing goals was facilitated in order to provide a rudimentary understanding of: what are goals, how to formulate goals, and the therapeutic benefit for setting goals. The acronym (SMART) was utilized for goal development: specific, measurable, attainable, relevant, and timely. Patient was an active verbal participant; contributing thoughts and ideas to topic. Occasionally, verbal contributions were disorganized; however, patient was easily redirectable. Patient appeared to be interested in topic and offered active listening. Patient thanked writer for group at end of session.     Of note, this topic group started late due to pet therapy visit in which patient actively participated in.

## 2023-04-28 LAB
ARSENIC BLD-MCNC: <10 UG/L
GLUCOSE BLDC GLUCOMTR-MCNC: 220 MG/DL (ref 70–99)
GLUCOSE BLDC GLUCOMTR-MCNC: 234 MG/DL (ref 70–99)
LEAD BLDV-MCNC: <2 UG/DL
MERCURY BLD-MCNC: <2.5 UG/L

## 2023-04-28 PROCEDURE — 90853 GROUP PSYCHOTHERAPY: CPT

## 2023-04-28 PROCEDURE — 124N000002 HC R&B MH UMMC

## 2023-04-28 PROCEDURE — 99231 SBSQ HOSP IP/OBS SF/LOW 25: CPT

## 2023-04-28 PROCEDURE — 250N000013 HC RX MED GY IP 250 OP 250 PS 637: Performed by: PSYCHIATRY & NEUROLOGY

## 2023-04-28 PROCEDURE — 250N000013 HC RX MED GY IP 250 OP 250 PS 637: Performed by: NURSE PRACTITIONER

## 2023-04-28 PROCEDURE — 250N000013 HC RX MED GY IP 250 OP 250 PS 637

## 2023-04-28 RX ORDER — LANOLIN ALCOHOL/MO/W.PET/CERES
3 CREAM (GRAM) TOPICAL AT BEDTIME
Status: DISCONTINUED | OUTPATIENT
Start: 2023-04-28 | End: 2023-05-05

## 2023-04-28 RX ORDER — METFORMIN HCL 500 MG
1000 TABLET, EXTENDED RELEASE 24 HR ORAL 2 TIMES DAILY WITH MEALS
Status: DISCONTINUED | OUTPATIENT
Start: 2023-04-28 | End: 2023-04-28

## 2023-04-28 RX ADMIN — HYDROXYZINE HYDROCHLORIDE 25 MG: 25 TABLET, FILM COATED ORAL at 21:01

## 2023-04-28 RX ADMIN — HALOPERIDOL 2 MG: 2 TABLET ORAL at 19:02

## 2023-04-28 RX ADMIN — FLUTICASONE PROPIONATE 1 SPRAY: 50 SPRAY, METERED NASAL at 08:15

## 2023-04-28 RX ADMIN — FLUTICASONE PROPIONATE 1 SPRAY: 50 SPRAY, METERED NASAL at 19:04

## 2023-04-28 RX ADMIN — TRAZODONE HYDROCHLORIDE 50 MG: 50 TABLET ORAL at 21:01

## 2023-04-28 RX ADMIN — Medication 250 MG: at 08:16

## 2023-04-28 RX ADMIN — METFORMIN HYDROCHLORIDE 1000 MG: 500 TABLET, FILM COATED ORAL at 19:01

## 2023-04-28 RX ADMIN — METFORMIN HYDROCHLORIDE 1000 MG: 500 TABLET, FILM COATED ORAL at 12:16

## 2023-04-28 RX ADMIN — MELATONIN TAB 3 MG 3 MG: 3 TAB at 21:01

## 2023-04-28 RX ADMIN — INSULIN ASPART 2 UNITS: 100 INJECTION, SOLUTION INTRAVENOUS; SUBCUTANEOUS at 08:15

## 2023-04-28 ASSESSMENT — ACTIVITIES OF DAILY LIVING (ADL)
ADLS_ACUITY_SCORE: 18

## 2023-04-28 NOTE — PROGRESS NOTES
Internal Medicine Progress Note      Assessment and plan:  Alexander Mccoy is a 38 year old male with a history of type 2 diabetes, fatty infiltration of liver, childhood asthma, and unspecified psychosis.  Medicine following for diabetes management.    Diabetes mellitus type 2 (Hb A1c 10.9 on 4/28/23)  Poorly controlled diabetes mellitus type 2.  Patient states he was taking 2 tablets of metformin in the morning and 2 tablets in the evening.  Since he has been here, patient has been receiving long-acting metformin.  Patient states he is not tolerating this well, he believes he does not digest things quickly.  Patient requesting to go back on home dose of metformin.  Patient very unhappy with how often he is getting fingersticks.  Discussed with patient as we could decrease frequency of blood sugar checks for now, may need to increase frequency pending blood sugar trends.  -Change metformin to 1000 mg twice daily  -Discontinue sliding scale insulin  -Start Lantus 10 units daily  -POCT blood glucose checks twice daily and as needed  -Hypoglycemia protocol  -recheck Hb A1c in 3 months     Recent Labs   Lab 04/28/23  0807 04/27/23  2118 04/27/23  1701 04/27/23  1207 04/27/23  0832 04/27/23  0148   * 259* 242* 204* 209* 189*           Objective:  /87 (BP Location: Left arm)   Pulse 86   Temp 98.4  F (36.9  C) (Oral)   Resp 15   Wt 86.7 kg (191 lb 1.6 oz)   SpO2 97%   BMI 31.80 kg/m      Vitals signs reviewed and noted    GENERAL: Alert and oriented x3  HEENT: Anicteric sclera. MMM  CV: RRR, S1, S2. No murmur noted  RESPIRATORY: Effort normal on RA. Lungs CTAB with no wheezing or rales  GI: Abdomen soft, non-tender with active bowel sounds. No rebound or guarding  NEUROLOGICAL: No focal deficits. Moves all extremities  EXTREMITIES: No peripheral edema. Intact bilateral pedal pulses  SKIN: No jaundice, no rash    Pertinent labs and procedures were reviewed     Subjective:     Met with patient in room,  calm and cooperative for assessment.  Patient able to recall his home metformin regimen.  Difficult to know if he has been compliant.  Discussed hemoglobin A1c of greater than 10 and indication to start Lantus.  Patient wary, states in the past he has reacted poorly to insulin and gets tired easily.  Encouraged him to start Lantus here where he has 24-hour nursing care.  Patient states he has trouble digesting long-acting metformin and would prefer to be changed to the short acting.  Patient very unhappy with how often he is getting fingersticks.  Feel it is reasonable to decrease these to twice daily and as needed signs and symptoms of hypoglycemia or hyperglycemia.  Discussed with patient this may need to increase frequency depending on blood sugar trends.     Medicine will continue to follow along for medication changes and blood sugar monitoring.    CLARA Menon CNP  Internal Medicine [facility] Hospitalist  Page job code [phone] (3B), [phone] (3A), or [phone] ([facility] and )  Text paging via [employer] is appreciated  April 28, 2023

## 2023-04-28 NOTE — PLAN OF CARE
Appointment   Court exam  9:15 - 10:15 Exam  Zoom Link: Exam 1  Meeting ID:   Passcode:  [id number]    Preliminary Hearing  10:15-10:30  Zoom Link: Judge Abreu  Meeting ID:    Passcode:  [id number]    Final Hearing: May 1st @ 9:45AM TBD    Tasks Complete:    Chart review     Team meeting     PT final hearing confirmed for 9:45AM Monday 5/1/23      Current Symptoms include the following: See RN note     Addressed patient needs/concerns: See above intervention    Discharge Plan or Goal   Plan  TBD likely discharge home with outpatient services    Commitment  PT is on a court hold   Hearings TBD    Care Team   No outpatient care team   Father - Gokul Mccoy ([phone])  Civil commitment - Fátima Nunez- [phone]     Barriers to Discharge   Ongoing stabilization  Court hold      Referral Status  PT would benefit from  such as case management, psychiatry, OP programming. No referrals have been made as of yet.      Legal Status  Court hold

## 2023-04-28 NOTE — PROGRESS NOTES
Pt had requested staff not to wake him up for 0200 BG checks. Pt appears to be sleeping 4 hrs. He was up for personal requests. Pt declined prn sleeping medication. Pt is on status 15 min checks.     Blocked bed due to severe paranoia, delusional thoughts.

## 2023-04-28 NOTE — PROGRESS NOTES
"  04/27/23 1800   Group Therapy Session   Time Session Began 1800   Time Session Ended 1900   Total Time (minutes) 53   Total # Attendees 6   Group Type expressive therapy   Group Topic Covered balanced lifestyle;relaxation techniques   Group Session Detail Evening Relaxation   Patient Response/Contribution cooperative with task   Patient Participation Detail Cooperatively engaged in Evening Music Relaxation group to decrease anxiety and promote connectedness with self and others on the unit through music.  Upbeat affect, appropriately engaged in session, responding well to the music.  Came up to MT after group and said \"you should do music for the staff-to pep them up!\"  Pleasant demeanor, although did appear a bit altered.          "
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
## 2023-04-29 LAB
GLUCOSE BLDC GLUCOMTR-MCNC: 202 MG/DL (ref 70–99)
GLUCOSE BLDC GLUCOMTR-MCNC: 240 MG/DL (ref 70–99)

## 2023-04-29 PROCEDURE — 250N000012 HC RX MED GY IP 250 OP 636 PS 637

## 2023-04-29 PROCEDURE — 250N000013 HC RX MED GY IP 250 OP 250 PS 637: Performed by: PSYCHIATRY & NEUROLOGY

## 2023-04-29 PROCEDURE — H2032 ACTIVITY THERAPY, PER 15 MIN: HCPCS

## 2023-04-29 PROCEDURE — 124N000002 HC R&B MH UMMC

## 2023-04-29 PROCEDURE — 250N000013 HC RX MED GY IP 250 OP 250 PS 637

## 2023-04-29 PROCEDURE — 250N000013 HC RX MED GY IP 250 OP 250 PS 637: Performed by: NURSE PRACTITIONER

## 2023-04-29 RX ADMIN — SALINE NASAL SPRAY 1 SPRAY: 1.5 SOLUTION NASAL at 05:51

## 2023-04-29 RX ADMIN — MELATONIN TAB 3 MG 3 MG: 3 TAB at 22:40

## 2023-04-29 RX ADMIN — INSULIN GLARGINE 10 UNITS: 100 INJECTION, SOLUTION SUBCUTANEOUS at 08:14

## 2023-04-29 RX ADMIN — HYDROXYZINE HYDROCHLORIDE 25 MG: 25 TABLET, FILM COATED ORAL at 22:40

## 2023-04-29 RX ADMIN — Medication 250 MG: at 08:12

## 2023-04-29 RX ADMIN — TRAZODONE HYDROCHLORIDE 50 MG: 50 TABLET ORAL at 22:40

## 2023-04-29 RX ADMIN — FLUTICASONE PROPIONATE 1 SPRAY: 50 SPRAY, METERED NASAL at 08:14

## 2023-04-29 RX ADMIN — FLUTICASONE PROPIONATE 1 SPRAY: 50 SPRAY, METERED NASAL at 20:16

## 2023-04-29 RX ADMIN — METFORMIN HYDROCHLORIDE 1000 MG: 500 TABLET, FILM COATED ORAL at 08:12

## 2023-04-29 RX ADMIN — METFORMIN HYDROCHLORIDE 1000 MG: 500 TABLET, FILM COATED ORAL at 17:36

## 2023-04-29 ASSESSMENT — ACTIVITIES OF DAILY LIVING (ADL)
ADLS_ACUITY_SCORE: 18

## 2023-04-29 NOTE — PROGRESS NOTES
"   04/28/23 2300   Group Therapy Session   Group Attendance attended group session   Time Session Began 2000   Time Session Ended 2100   Total Time (minutes) 60   Total # Attendees 6   Group Type psychotherapeutic   Group Topic Covered cognitive therapy techniques   Group Session Detail DBT process   Patient Response/Contribution cooperative with task;discussed personal experience with topic     Pt said he felt \" in between\" He spoke about feeling trapped and being on the unit unjustly, he said they \" said I was psychotic and i've never been psychotic in my life.\" He was quite talkative , needed some redirection for side talk but is kind and has a nice friendship with a male pt on the unit, who said he appreciates the friendship with Alexander  "

## 2023-04-29 NOTE — PLAN OF CARE
Problem: Psychotic Signs/Symptoms  Goal: Increased Participation and Engagement (Psychotic Signs/Symptoms)  Outcome: Progressing   Goal Outcome Evaluation:    Plan of Care Reviewed With: patient             Patient awake at the start of day shift , BG check 240 before breakfast. Scheduled metformin and Lantus given. Patient unhappy with meal choices at lunchtime, but aside from a missing meal supplement his tray looked good. He participates in groups, socializes in the milieu with peers, is calm, cooperative, and medication compliant. Patient denies SI/HI, depression, and hallucinations. He contracts for safety. Patient has no insight into his mental health diagnosis and says he shouldn't even be here. Patient contracts for safety. He eats 100% of all meals. /78 (BP Location: Left arm, Patient Position: Sitting, Cuff Size: Adult Regular)   Pulse 80   Temp 97.2  F (36.2  C) (Temporal)   Resp 16   Wt 86.7 kg (191 lb 1.6 oz)   SpO2 99%   BMI 31.80 kg/m

## 2023-04-29 NOTE — PLAN OF CARE
Pt appeared to be sleeping at the start of the night shift.  He was awake at 2 am to get ice-water.  He approached staff to inquire about his 2 am BG check and appeared happy when staff told him that it was not necessary.  Pt returned to rest in his room.  He was awake at 3:15 am and was observed walking about the unit.  Pt sat in the lounge for approximately 40 minutes then returned to his room to rest.  Approximately 6 hours of sleep is recorded.  No angry outbursts or paranoid rants exhibited toward staff tonight.

## 2023-04-29 NOTE — PLAN OF CARE
Problem: Psychotic Signs/Symptoms  Goal: Increased Participation and Engagement (Psychotic Signs/Symptoms)  Outcome: Progressing   Goal Outcome Evaluation:    Plan of Care Reviewed With: patient        Patient awake at the start of day shift and went to bed at 2130. In between he was in the milieu, attending groups, interacting and socializing with staff. Patient spoke with RANJIT Lott to have 0200 BG check discontinued, and metformin dosage and time changed. Patient refused evening haldol, but requested and received PRN hydroxyzine for anxiety, and PRN trazadone, and PRN melatonin for sleep. Patient denies SI/HI, depression, and hallucinations. He contracts for safety. Patient has no insight into his mental health diagnosis and says he shouldn't even be here. Patient contracts for safety. He eats 100% of all meals. /79   Pulse 86   Temp 98.1  F (36.7  C) (Oral)   Resp 16   Wt 86.7 kg (191 lb 1.6 oz)   SpO2 98%   BMI 31.80 kg/m
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
## 2023-04-30 LAB — GLUCOSE BLDC GLUCOMTR-MCNC: 172 MG/DL (ref 70–99)

## 2023-04-30 PROCEDURE — 250N000013 HC RX MED GY IP 250 OP 250 PS 637: Performed by: PSYCHIATRY & NEUROLOGY

## 2023-04-30 PROCEDURE — 250N000013 HC RX MED GY IP 250 OP 250 PS 637: Performed by: NURSE PRACTITIONER

## 2023-04-30 PROCEDURE — 124N000002 HC R&B MH UMMC

## 2023-04-30 PROCEDURE — 250N000013 HC RX MED GY IP 250 OP 250 PS 637

## 2023-04-30 RX ADMIN — FLUTICASONE PROPIONATE 1 SPRAY: 50 SPRAY, METERED NASAL at 21:12

## 2023-04-30 RX ADMIN — METFORMIN HYDROCHLORIDE 1000 MG: 500 TABLET, FILM COATED ORAL at 17:10

## 2023-04-30 RX ADMIN — TRAZODONE HYDROCHLORIDE 50 MG: 50 TABLET ORAL at 22:42

## 2023-04-30 RX ADMIN — SALINE NASAL SPRAY 1 SPRAY: 1.5 SOLUTION NASAL at 05:39

## 2023-04-30 RX ADMIN — FLUTICASONE PROPIONATE 1 SPRAY: 50 SPRAY, METERED NASAL at 08:27

## 2023-04-30 RX ADMIN — Medication 250 MG: at 08:26

## 2023-04-30 RX ADMIN — MELATONIN TAB 3 MG 3 MG: 3 TAB at 22:42

## 2023-04-30 RX ADMIN — METFORMIN HYDROCHLORIDE 1000 MG: 500 TABLET, FILM COATED ORAL at 08:27

## 2023-04-30 RX ADMIN — INSULIN GLARGINE 10 UNITS: 100 INJECTION, SOLUTION SUBCUTANEOUS at 08:26

## 2023-04-30 RX ADMIN — HYDROXYZINE HYDROCHLORIDE 25 MG: 25 TABLET, FILM COATED ORAL at 22:42

## 2023-04-30 ASSESSMENT — ACTIVITIES OF DAILY LIVING (ADL)
HYGIENE/GROOMING: INDEPENDENT
ADLS_ACUITY_SCORE: 18
ORAL_HYGIENE: INDEPENDENT
ADLS_ACUITY_SCORE: 18
ADLS_ACUITY_SCORE: 18
ORAL_HYGIENE: INDEPENDENT
ADLS_ACUITY_SCORE: 18
HYGIENE/GROOMING: INDEPENDENT
DRESS: SCRUBS (BEHAVIORAL HEALTH);INDEPENDENT
ADLS_ACUITY_SCORE: 18
ADLS_ACUITY_SCORE: 18
DRESS: SCRUBS (BEHAVIORAL HEALTH);INDEPENDENT
ADLS_ACUITY_SCORE: 18

## 2023-04-30 NOTE — PLAN OF CARE
04/29/23 2100   Group Therapy Session   Group Attendance attended group session   Time Session Began 2000   Time Session Ended 2045   Total Time (minutes) 45   Total # Attendees 4   Group Type expressive therapy   Group Topic Covered emotions/expression   Patient Response/Contribution cooperative with task     Art Therapy directive was to create a circular visual art tool expressing both active and passive activities/coping skills using drawing media of pts choice. Pts were encouraged to use their artwork as a visual tool/reminder of these skills.  Goals of directive: emotional expression, emotional regulation, coping skills  Pt was an engaged participant, focused on task for the majority of time pt attended group.  Pt needs a second AT group to finish artwork.   Pts mood was calm, pleasant participant.

## 2023-04-30 NOTE — PLAN OF CARE
"Goal Outcome Evaluation:    Plan of Care Reviewed With: patient      Patient is visible in the milieu. Social and engaged with peers. Denied having anxiety and depression. Patient stated \"I'm not anxious. I just don't wanna be here. They just wanna get money from people\". Patient lacks insight to his mental illness.     Patient was complaining why his blood glucose need to be checked but he allowed staff to check it. BG was 172. Patient was pre occupied about food/menu. He was heard talking to peers about \"GMO\" food that should not be eaten. When breakfast came, patient complained that he should get more protein.     Patient was med compliant. He complained why he's only getting 250mg of Vit C. Patient was later observed playing chess with staff at the dining area. Patient's mom came to visit. Mom was heard telling patient to take his meds. After that conversation, patient asked his mom to leave. This afternoon, patient asked staff if he could get the door opened so he could leave. Will continue to monitor.               "

## 2023-04-30 NOTE — PLAN OF CARE
"Pt appeared to be quietly sleeping as staff did rounds.  No behavioral outbursts observed tonight.  He is awake at 5:30 am and requested his [place] nasal spray for nasal congestion.  \"I have allergies\".  Pt asked about his court hearing and whether he will need to call a .  Writer informed him that he will be appointed a  and when staff will let him know when his next court date is determined.  Pt returned to his room after getting some hot tea.  Approximately 6 hours of sleep is recorded tonight.  "

## 2023-05-01 ENCOUNTER — MEDICAL CORRESPONDENCE (OUTPATIENT)
Dept: HEALTH INFORMATION MANAGEMENT | Facility: CLINIC | Age: 38
End: 2023-05-01

## 2023-05-01 LAB — GLUCOSE BLDC GLUCOMTR-MCNC: 167 MG/DL (ref 70–99)

## 2023-05-01 PROCEDURE — 99232 SBSQ HOSP IP/OBS MODERATE 35: CPT | Performed by: NURSE PRACTITIONER

## 2023-05-01 PROCEDURE — H2032 ACTIVITY THERAPY, PER 15 MIN: HCPCS

## 2023-05-01 PROCEDURE — 250N000013 HC RX MED GY IP 250 OP 250 PS 637: Performed by: NURSE PRACTITIONER

## 2023-05-01 PROCEDURE — 124N000002 HC R&B MH UMMC

## 2023-05-01 PROCEDURE — 250N000013 HC RX MED GY IP 250 OP 250 PS 637

## 2023-05-01 PROCEDURE — 250N000013 HC RX MED GY IP 250 OP 250 PS 637: Performed by: PSYCHIATRY & NEUROLOGY

## 2023-05-01 RX ADMIN — INSULIN GLARGINE 10 UNITS: 100 INJECTION, SOLUTION SUBCUTANEOUS at 08:03

## 2023-05-01 RX ADMIN — FLUTICASONE PROPIONATE 1 SPRAY: 50 SPRAY, METERED NASAL at 20:51

## 2023-05-01 RX ADMIN — SALINE NASAL SPRAY 1 SPRAY: 1.5 SOLUTION NASAL at 05:11

## 2023-05-01 RX ADMIN — Medication 250 MG: at 08:03

## 2023-05-01 RX ADMIN — METFORMIN HYDROCHLORIDE 1000 MG: 500 TABLET, FILM COATED ORAL at 17:15

## 2023-05-01 RX ADMIN — METFORMIN HYDROCHLORIDE 1000 MG: 500 TABLET, FILM COATED ORAL at 08:03

## 2023-05-01 RX ADMIN — HYDROXYZINE HYDROCHLORIDE 25 MG: 25 TABLET, FILM COATED ORAL at 05:11

## 2023-05-01 RX ADMIN — FLUTICASONE PROPIONATE 1 SPRAY: 50 SPRAY, METERED NASAL at 08:03

## 2023-05-01 ASSESSMENT — ACTIVITIES OF DAILY LIVING (ADL)
ADLS_ACUITY_SCORE: 18
ADLS_ACUITY_SCORE: 18
ORAL_HYGIENE: INDEPENDENT
ADLS_ACUITY_SCORE: 18
ADLS_ACUITY_SCORE: 18
DRESS: SCRUBS (BEHAVIORAL HEALTH);INDEPENDENT
ADLS_ACUITY_SCORE: 18
LAUNDRY: WITH SUPERVISION
HYGIENE/GROOMING: INDEPENDENT
ADLS_ACUITY_SCORE: 18
ADLS_ACUITY_SCORE: 18

## 2023-05-01 NOTE — PLAN OF CARE
"Pt appeared to be sleeping in bed at the start of the night.  He approached writer at 4:15 am and calmly asked for his \"folder\" so he could \"prepare for court today\".  Pt had been keeping his personal folder with copies of his court papers at the desk.  When writer told him he could keep it in his room Pt replied he'd rather keep it at the desk with his shadow chart.  Pt commented that if he kept it in his room 'people' might enter his room and go through his personal information.  \"It's confidential\".  He took his folder and walked to his room.  Moments later he returned to ask for PRN medication to help him feel \"happier\". When writer asked him to specify what he wanted Pt asked for the medications he took last evening.  He couldn't recall the names but stated that one for for sleep and one was for depression.  Pt had taken PRN Trazodone, melatonin and hydroxyzine on evenings.  He agreed to take PRN Hydroxyzine after reviewing the medications with staff.  Pt also asked for PRN Ocean spray for sinus congestion.  He politely said 'thank you' and returned to his room.  Approximately 4.5 hours of sleep is recorded.  He continues on elopement and cheeking precautions.  No Roommate order is continued for paranoia, intrusiveness and impulsive behavior.    "

## 2023-05-01 NOTE — PROGRESS NOTES
Pt presented with full range affect, calm mood. Endorsed back pain 4/10, declined intervention. Denied all mental health symptoms, and contracted for safety. Pt is distrustful/suspicious of staff about the care and the whether the medications prescribed is good enough. Pt does not want to take responsibility for his health, reports that he does not eat a lot and exercises a lot so he does not see how come his BG is high, per staff reports likes to snack a lot. BG was 167 mg/dl, compliant with all medications which includes Lantus and metformin. Visible in the milieu, social and engaged with staff and peers. Observed pacing the hallway intermittently, playing checkers game with staff. Pt was compliant with all scheduled medications, no PRNs given this shift. No evidence of cheeking while taking medications. Pt has an order for lipid panel/fasting for tomorrow morning.
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
## 2023-05-01 NOTE — PROGRESS NOTES
[facility],  Psychiatric Progress Note      Impression:     Alexander Mccoy is a 38-year-old male admitted to [address] on 4/22/2023.  He was admitted on a court hold from the [facility] ER due to psychosis.  He stated that he went to a police station because he believed undercover governmental agents were tracking him.  He said he no longer has a job because the government agents had infiltrated through his phone and computer.  He became involved in an altercation with the  at the [facility].  UTOX was negative and he denies substance abuse.  He was not taking any psychotropic medications prior to admission.  While in the ER, he was intermittently agitated.  He called 911.  During his time in the ER, a petition for commitment MI with [employer]of[employer] was filed in [facility], and he was placed on a court hold.  He has type 2 diabetes.  HbA1c was 10.9.  He reports taking Metformin XR prior to admission.  While in the ER, he was inconsistently adherent to insulin and BG checks.  During the admission assessment, he reports rarely checking his blood glucose and states that his BG never exceeds 200, however BGs have often been over 300 since he was in the ER, which he relates to stress.  He says if he were not in the hospital, they would be lower.   He was not taking any psychotropic medications prior to admission.  Abilify was initiated in the ER, but he refused the second dose.  He states he will not take psychotropic medications nor consent to labs or insulin while hospitalized.  Throughout the conversation, he rarely responded directly to provider's inquiries and instead made comments about being tracked and the injustices of being hospitalized.  Since admission, Abilify was increased, and he has refused 3/4 doses.  Abilify was then discontinued and replaced with Haldol; he has taken 2 doses and has refused the rest.   Melatonin was initiated.  PRNs of Trazodone, Hydroxyzine and Zyprexa were initiated.  He initially refused BG checks and insulin but has been adhering since the evening of 4/22 onward.  He has been calmer and less irritable.  He continues to have paranoid delusions and poor insight.  He has been sleeping poorly.          Diagnoses:     Unspecified psychosis (differentials include schizophrenia spectrum disorder and bipolar disorder)  Type 2 diabetes, insulin dependent         Plan:     Medications:  Continue Melatonin 3 mg at HS.  Continue to offer Haldol 2 mg at HS, though he states intent to refuse it.  PRNs of Hydroxyzine, Zyprexa and Trazodone are available.    A petition for commitment MI with Kristofer (requested meds Abilify, Risperdal, Haldol, Zyprexa) was filed in [facility], and he is on a court hold.  Preliminary hearing was 4/26.  Final hearing was 5/1 with outcome TBD.    Internal medicine continuing to follow for diabetes management.    First episode psychosis labs were unremarkable.     Lipid panel tomorrow.      He lives with his parents.  Disposition to be determined pending the outcome of court.  He does not have outpatient providers. Plan for ENT referral for tinnitus.        Attestation:  Patient has been seen and evaluated by me, CLARA Anthony CNP  The patient was counseled on nature of illness and treatment plan/options  Care was coordinated with treatment team, internal medicine  Total time > 35 minutes       Clinical Global Impressions  First:  Considering your total clinical experience with this particular patient population, how severe are the patient's symptoms at this time?: 7 (04/22/23 0916)  Compared to the patient's condition at the START of treatment, this patient's condition is: 4 (04/22/23 0916)  Most recent:  Considering your total clinical experience with this particular patient population, how severe are the patient's symptoms at this time?: 7 (05/01/23  "0731)  Compared to the patient's condition at the START of treatment, this patient's condition is: 4 (05/01/23 0731)           Interim History:     The patient's care was discussed with the treatment team and chart notes were reviewed.  Pt was documented as sleeping 6, 6 and 4.5 hours during the weekend overnight shifts.  He took 1 of 3 scheduled Haldol doses from Friday evening onward.  He has been taking PRNs of Trazodone, Hydroxyzine and Ocean Spray.  His mother visited.  He attended some groups and spent time playing chess.  He told staff that they thought they were purposely harming him with blood draws.  BGs have been in the high 100's to low 200's.  Pt made a comment to staff that he thought his Metformin was \"fake.\"  Pt had his final court hearing today and it is under advisement.  He said his mood is \"sad because I'm here.\"  He said, \"I thought the government kept harassing me, driving around with M and J, Ted (the [employer]) and Kris, because I told them about the war in [facility].  They harass me with gerber numbers, triple digits.\"  He said he doesn't believe people from the government will harm him but worries that gangsters affiliated with the government will harm him.  Pt reports eating healthy foods and consuming Ensure Max.  Pt stated that when he commented about staff using their badge to open the door so he could leave the unit, he was joking because he was bored.  \"I had no intentions to leave.  I know if I leave I'll be here a lot longer.\"  Pt states he will not take Haldol.  He said the \"medication compresses my brain energy.  It's harmful to me physically.  It will make me sad.\"  States he will not take any antipsychotic medications.           Medications:     Current Facility-Administered Medications   Medication     acetaminophen (TYLENOL) tablet 650 mg     alum & mag hydroxide-simethicone (MAALOX) suspension 30 mL     benztropine (COGENTIN) tablet 1 mg     glucose gel 15-30 g    Or     " "dextrose 50 % injection 25-50 mL    Or     glucagon injection 1 mg     fluticasone (FLONASE) 50 MCG/ACT spray 1 spray     haloperidol (HALDOL) tablet 2 mg     hydrOXYzine (ATARAX) tablet 25 mg     ibuprofen (ADVIL/MOTRIN) tablet 600 mg     insulin glargine (LANTUS PEN) injection 10 Units     melatonin tablet 3 mg     metFORMIN (GLUCOPHAGE) tablet 1,000 mg     OLANZapine (zyPREXA) tablet 10 mg    Or     OLANZapine (zyPREXA) injection 10 mg     senna-docusate (SENOKOT-S/PERICOLACE) 8.6-50 MG per tablet 1 tablet     sodium chloride (OCEAN) 0.65 % nasal spray 1 spray     traZODone (DESYREL) tablet 50 mg     vitamin C (ASCORBIC ACID) tablet 250 mg             Allergies:   No Known Allergies         Psychiatric Examination:     /84   Pulse 82   Temp 98.3  F (36.8  C) (Oral)   Resp 17   Wt 88.2 kg (194 lb 8 oz)   SpO2 98%   BMI 32.37 kg/m      Appearance:  awake, alert, fair grooming, dressed in scrubs  Attitude:  cooperative  Eye Contact:  fair  Mood:  \"sad\"  Affect:  normal range  Speech:  clear, coherent, normal volume  Psychomotor Behavior:  no evidence of tardive dyskinesia, dystonia, or tics  Thought Process:  illogical, somewhat disorganized  Associations:  no loose associations  Thought Content:  denies suicidal and homicidal ideation, paranoid delusions are present, denies hallucinations  Insight:  poor  Judgment:  limited  Oriented to:  date, time, person, and place  Attention Span and Concentration:  fair  Recent and Remote Memory:  fair  Language:  intact, fluent English  Fund of Knowledge:  appropriate  Muscle Strength and Tone:  normal  Gait and Station:  normal          Labs:     Recent Results (from the past 24 hour(s))   Glucose by meter    Collection Time: 05/01/23  7:53 AM   Result Value Ref Range    GLUCOSE BY METER POCT 167 (H) 70 - 99 mg/dL     "

## 2023-05-01 NOTE — PLAN OF CARE
05/01/23 1634   Individualization/Patient Specific Goals   Patient Personal Strengths expressive of emotions;positive educational history;positive vocational history;stable living environment   Patient Vulnerabilities family/relationship conflict;lacks insight into illness;occupational insecurity;limited support system   Anxieties, Fears or Concerns PT notes his fears of taking antipsychotic medications   Interprofessional Rounds   Summary Pt engaged in his final court hearing today. He is still on a court hold. He continues to lack insight to his mental illness and does not want to take antipsychotic medications. He does continue to go to groups.   Participants advanced practice nurse;CTC;nursing   Behavioral Team Discussion   Participants Provider: Savannah Pope NP, CTC: Kasandra DRISCOLL [facility], Nursing:  Lea PEDRAZA   Progress Court hold.   Anticipated length of stay 2-4 weeks depending on disposition of the courts   Continued Stay Criteria/Rationale ongoing stabilization   Medical/Physical dx with diabeties type 2- pt has BG regime   Precautions see below   Plan TBD discharge home with OP services when stable. Pt is on a court hold   Anticipated Discharge Disposition other (see comments)     Goal Outcome Evaluation:

## 2023-05-01 NOTE — PLAN OF CARE
"Appointment   Court exam  9:15 - 10:15 Exam  Zoom Link: Exam 1  Meeting ID:   Passcode:  [id number]    Preliminary Hearing  10:15-10:30  Zoom Link: Judge Abreu  Meeting ID:    Passcode:  [phone]    Final Hearing: May 1st @ 9:45AM TBD    Tasks Complete:    Chart review     Team meeting     CTC provided updates the courts.     [facility] spoke with pt about upcoming court hearing. Pt asked CTC to be his  because \" he doesn't know what to say\". He notes that he doesn't like Abilify or Haldol because they \" take away my dopamine\". CTC informed pt to speak with his provider about medication education as there is a lot of information pt is missing surrounding this medication.    PT participated in court today. CTC explained next steps. Pt receptive       Current Symptoms include the following: See RN note     Addressed patient needs/concerns: See above intervention    Discharge Plan or Goal   Plan  TBD likely discharge home with outpatient services    Commitment  PT is on a court hold   Hearings TBD    Care Team   No outpatient care team   Father - Gokul Mccoy ([phone])  Civil commitment - Fátima Nunez- [phone]     Barriers to Discharge   Ongoing stabilization  Court hold      Referral Status  PT would benefit from  such as case management, psychiatry, OP programming. No referrals have been made as of yet.      Legal Status  Court hold       "

## 2023-05-01 NOTE — PLAN OF CARE
"Goal Outcome Evaluation:    Plan of Care Reviewed With: patient       Patient declined BG check and considers it \"annoying\". He said his fingers are like \"potatoes\" and he's been \"poked\" \"70 times\".     When writer gave patient his scheduled Metformin (package was opened in front of patient), patient complained that med was \"fake\" (meds were in patient's mouth). Patient stated \"It's not the same as the one I take at home. This does not turn into a gel\". Patient swallowed the medication and stated \"that's why you get to keep me here, I'm getting a fake medication\". Writer showed patient the med packaging. Writer also explained to patient that he could be taking a different brand at home but it's the same medication. Patient continued to talk about the \"fake\" medication.    Patient refused to take hs Haldol. Patient said he's not taking \"antipsychotic\" at all. He told writer to just \"throw\" the medication in the \"trash\" or give it to \"those people\". Patient said \"those people\" means \"everybody\". Patient took PRN Trazodone and Hydroxyzine with his scheduled Melatonin.             "

## 2023-05-02 LAB — GLUCOSE BLDC GLUCOMTR-MCNC: 204 MG/DL (ref 70–99)

## 2023-05-02 PROCEDURE — 250N000013 HC RX MED GY IP 250 OP 250 PS 637: Performed by: NURSE PRACTITIONER

## 2023-05-02 PROCEDURE — 124N000002 HC R&B MH UMMC

## 2023-05-02 PROCEDURE — 250N000013 HC RX MED GY IP 250 OP 250 PS 637: Performed by: PSYCHIATRY & NEUROLOGY

## 2023-05-02 PROCEDURE — G0177 OPPS/PHP; TRAIN & EDUC SERV: HCPCS

## 2023-05-02 PROCEDURE — 99232 SBSQ HOSP IP/OBS MODERATE 35: CPT | Performed by: NURSE PRACTITIONER

## 2023-05-02 RX ORDER — METFORMIN HCL 500 MG
2000 TABLET, EXTENDED RELEASE 24 HR ORAL EVERY MORNING
Status: DISCONTINUED | OUTPATIENT
Start: 2023-05-02 | End: 2023-06-05

## 2023-05-02 RX ORDER — HALOPERIDOL 5 MG/ML
5 INJECTION INTRAMUSCULAR EVERY EVENING
Status: DISCONTINUED | OUTPATIENT
Start: 2023-05-02 | End: 2023-05-05

## 2023-05-02 RX ORDER — HALOPERIDOL 5 MG/1
5 TABLET ORAL EVERY EVENING
Status: DISCONTINUED | OUTPATIENT
Start: 2023-05-02 | End: 2023-05-05

## 2023-05-02 RX ADMIN — MELATONIN TAB 3 MG 3 MG: 3 TAB at 00:11

## 2023-05-02 RX ADMIN — METFORMIN ER 500 MG 2000 MG: 500 TABLET ORAL at 08:17

## 2023-05-02 RX ADMIN — FLUTICASONE PROPIONATE 1 SPRAY: 50 SPRAY, METERED NASAL at 08:17

## 2023-05-02 RX ADMIN — TRAZODONE HYDROCHLORIDE 50 MG: 50 TABLET ORAL at 00:12

## 2023-05-02 RX ADMIN — OLANZAPINE 10 MG: 10 TABLET, FILM COATED ORAL at 16:15

## 2023-05-02 RX ADMIN — HYDROXYZINE HYDROCHLORIDE 25 MG: 25 TABLET, FILM COATED ORAL at 00:11

## 2023-05-02 RX ADMIN — INSULIN GLARGINE 10 UNITS: 100 INJECTION, SOLUTION SUBCUTANEOUS at 08:17

## 2023-05-02 RX ADMIN — HALOPERIDOL 5 MG: 5 TABLET ORAL at 21:32

## 2023-05-02 RX ADMIN — Medication 250 MG: at 08:17

## 2023-05-02 ASSESSMENT — ACTIVITIES OF DAILY LIVING (ADL)
LAUNDRY: WITH SUPERVISION
HYGIENE/GROOMING: INDEPENDENT
ADLS_ACUITY_SCORE: 18
ADLS_ACUITY_SCORE: 18
HYGIENE/GROOMING: INDEPENDENT
ADLS_ACUITY_SCORE: 18
DRESS: SCRUBS (BEHAVIORAL HEALTH)
ADLS_ACUITY_SCORE: 18
ADLS_ACUITY_SCORE: 18
ORAL_HYGIENE: INDEPENDENT
ADLS_ACUITY_SCORE: 18
DRESS: SCRUBS (BEHAVIORAL HEALTH)
LAUNDRY: WITH SUPERVISION
ADLS_ACUITY_SCORE: 18
ORAL_HYGIENE: INDEPENDENT
ADLS_ACUITY_SCORE: 18

## 2023-05-02 NOTE — PROGRESS NOTES
[facility],  Psychiatric Progress Note      Impression:     Alexander Mccoy is a 38-year-old male admitted to [address] on 4/22/2023.  He was admitted on a court hold from the [facility] ER due to psychosis.  He stated that he went to a police station because he believed undercover governmental agents were tracking him.  He said he no longer has a job because the government agents had infiltrated through his phone and computer.  He became involved in an altercation with the  at the [facility].  UTOX was negative and he denies substance abuse.  He was not taking any psychotropic medications prior to admission.  While in the ER, he was intermittently agitated.  He called 911.  During his time in the ER, a petition for commitment MI with [facility] was filed in [facility], and he was placed on a court hold.  He has type 2 diabetes.  HbA1c was 10.9.  He reports taking Metformin XR prior to admission.  While in the ER, he was inconsistently adherent to insulin and BG checks.  During the admission assessment, he reports rarely checking his blood glucose and states that his BG never exceeds 200, however BGs have often been over 300 since he was in the ER, which he relates to stress.  He says if he were not in the hospital, they would be lower.   He was not taking any psychotropic medications prior to admission.  Abilify was initiated in the ER, but he refused the second dose.  He states he will not take psychotropic medications nor consent to labs or insulin while hospitalized.  Throughout the conversation, he rarely responded directly to provider's inquiries and instead made comments about being tracked and the injustices of being hospitalized.  Since admission, Abilify was increased, and he refused 3/4 doses.  Abilify was then discontinued and replaced with Haldol; he has taken 2 doses and has refused the rest.  Melatonin  was initiated.  PRNs of Trazodone, Hydroxyzine and Zyprexa were initiated.  He initially refused BG checks and insulin but has been adhering since the evening of 4/22 onward.  He has been calmer and less irritable.  He continues to have paranoid delusions and poor insight.  He has been sleeping poorly.  He is committed and Jarvised as of 5/2.            Diagnoses:     Unspecified psychosis (differentials include schizophrenia spectrum disorder and bipolar disorder)  Type 2 diabetes, insulin dependent         Plan:     Medications:  Increase Haldol to 5 mg PO at HS with a back up of IM per [facility]er.  Continue Melatonin 3 mg at HS.  PRNs of Hydroxyzine, Zyprexa and Trazodone are available.    He is committed MI with [facility] (Abilify, Risperdal, Haldol, Zyprexa) in [facility].     Internal medicine continuing to follow for diabetes management.    First episode psychosis labs were unremarkable.     He lives with his parents.  Disposition to be determined pending the outcome of court.  He does not have outpatient providers. Plan for ENT referral for tinnitus.        Attestation:  Patient has been seen and evaluated by me, CLARA Anthony CNP  The patient was counseled on nature of illness and treatment plan/options  Care was coordinated with treatment team, internal medicine  Total time > 35 minutes       Clinical Global Impressions  First:  Considering your total clinical experience with this particular patient population, how severe are the patient's symptoms at this time?: 7 (04/22/23 0916)  Compared to the patient's condition at the START of treatment, this patient's condition is: 4 (04/22/23 0916)  Most recent:  Considering your total clinical experience with this particular patient population, how severe are the patient's symptoms at this time?: 7 (05/01/23 0731)  Compared to the patient's condition at the START of treatment, this patient's condition is: 4 (05/01/23 0731)           Interim History:  "    The patient's care was discussed with the treatment team and chart notes were reviewed.  Pt was documented as sleeping 5.5 hours during the overnight shift.  He attended 1 group yesterday.  He refused and then accepted Melatonin.  He was angry and yelled when his RN offered him scheduled Haldol.  He demanded to be discharged.  He asked that the treatment team call the  at home and contact the [place] Police.  He refused BG checks the past 2 evenings.  After requesting Pravastatin yesterday, he refused his blood draw for lipid panel today.  States he no longer wants to resume Pravastatin.  Pt asked for Metformin XR in lieu of regular release because he perceives it to be more effective.  \"The Metformin I have (at home) coats around the stomach to prevent sugar from absorbing.\"  Pt also asked that rice, broth, and Ensure be discontinued.  Pt states the the government is \"harassing\" him.  Pt stated he will not take neuroleptic medications.  \"Antipsychosis medicine makes me feel dumb.\"  Pt stated that if he is committed and Jarvised he will continue to refuse meds.  Pt was not receptive to feedback that refusal of PO meds would result in administration of IM medications.  Awaiting outcome of yesterday's final court hearing.      ADDENDUM:  Commitment and Miner paperwork received today.  Haldol increased to 5 mg at HS with back up of IM per Miner.           Medications:     Current Facility-Administered Medications   Medication     acetaminophen (TYLENOL) tablet 650 mg     alum & mag hydroxide-simethicone (MAALOX) suspension 30 mL     benztropine (COGENTIN) tablet 1 mg     glucose gel 15-30 g    Or     dextrose 50 % injection 25-50 mL    Or     glucagon injection 1 mg     fluticasone (FLONASE) 50 MCG/ACT spray 1 spray     haloperidol (HALDOL) tablet 2 mg     hydrOXYzine (ATARAX) tablet 25 mg     ibuprofen (ADVIL/MOTRIN) tablet 600 mg     [START ON 5/3/2023] insulin glargine (LANTUS PEN) injection 13 Units "     melatonin tablet 3 mg     metFORMIN (GLUCOPHAGE XR) 24 hr tablet 2,000 mg     OLANZapine (zyPREXA) tablet 10 mg    Or     OLANZapine (zyPREXA) injection 10 mg     senna-docusate (SENOKOT-S/PERICOLACE) 8.6-50 MG per tablet 1 tablet     sodium chloride (OCEAN) 0.65 % nasal spray 1 spray     traZODone (DESYREL) tablet 50 mg     vitamin C (ASCORBIC ACID) tablet 250 mg             Allergies:   No Known Allergies         Psychiatric Examination:     /68   Pulse 105   Temp 97.5  F (36.4  C)   Resp 16   Wt 88.5 kg (195 lb)   SpO2 99%   BMI 32.45 kg/m      Appearance:  awake, alert, fair grooming, dressed in scrubs  Attitude:  cooperative  Eye Contact:  fair  Mood:  frustrated  Affect:  increased intensity  Speech:  clear, coherent, normal volume   Psychomotor Behavior:  no evidence of tardive dyskinesia, dystonia, or tics  Thought Process:  illogical, somewhat disorganized  Associations:  no loose associations  Thought Content:  denies suicidal and homicidal ideation, paranoid delusions are present, denies hallucinations  Insight:  poor  Judgment:  limited  Oriented to:  date, time, person, and place  Attention Span and Concentration:  fair  Recent and Remote Memory:  fair  Language:  intact, fluent English  Fund of Knowledge:  appropriate  Muscle Strength and Tone:  normal  Gait and Station:  normal          Labs:     Recent Results (from the past 24 hour(s))   Glucose by meter    Collection Time: 05/02/23  8:08 AM   Result Value Ref Range    GLUCOSE BY METER POCT 204 (H) 70 - 99 mg/dL

## 2023-05-02 NOTE — PROGRESS NOTES
"Pt is refusing labs this morning.  He stated that he was \"poked many times already\".  Pt pointed to his left antecubital and his hands where he said the  had difficulty accessing a vein and needed to reinsert the needle several times.    Pt commented that he felt like fainting and described his experience as \"torture\".    He appears calmly walking the hallway wearing radio headphones at this time.   "

## 2023-05-02 NOTE — PROGRESS NOTES
05/01/23 2100   Group Therapy Session   Group Attendance attended group session   Time Session Began 2000   Time Session Ended 2050   Total Time (minutes) 45   Total # Attendees 4-5   Group Type recreation   Group Topic Covered leisure exploration/use of leisure time   Group Session Detail TR leisure group   Patient Response/Contribution cooperative with task;unable to interrupt patient   Patient Participation Detail Pt actively participated in a structured Therapeutic Recreation group with a focus on leisure participation, stress reduction, and social engagement via an active group game. Pt was eager to start the activity, but was constantly talking and distracted during the instructions prior to the game starting. Pt was very difficult to interrupt and get his focus. Pt often seemed to have difficulty with the scoring of the game, occasionally just guessing or stating random scores during the turns. Pt presented with high energy and sociable throughout the group.

## 2023-05-02 NOTE — PLAN OF CARE
"Pt appeared to be walking the unit wearing headphones.  Affect is calm but tense as he approached staff at midnight to request PRN medications.  \"I can't sleep.  Can I have the same medications I had last night.\"  Pt took PRN Trazodone, Melatonin and Hydroxyzine.  He then spoke with writer about frustrations with being in the hospital.  Pt remained calm and said that he thinks the court will likely force him to take antipsychotics and he believes it will harm his brain.  He argued with writer when reassured that antipsychotic medications will help his thoughts and mood.  He commented that he feels his mind was slower and that he felt \"retarded\" when he tried the antipsychotics.    Pt then spoke about being a good [employer] player and proudly stated that he beat some unit staff at [facility].   He remains adamant that he will not take antipsychotic medications but will consider antidepressants because they are 'happy pills'.  Pt walked to his room to rest after taking PRN medications.  He remained in his room resting in bed with no behavioral issues noted for the rest of the shift.  Approximately 5.5 hours of sleep is recorded.    Staff will continue to monitor and assess.  "

## 2023-05-02 NOTE — PLAN OF CARE
"  Problem: Psychotic Signs/Symptoms  Goal: Increased Participation and Engagement (Psychotic Signs/Symptoms)  Outcome: Not Progressing   Goal Outcome Evaluation:    Plan of Care Reviewed With: patient    Pt out in the milieu paranoid , delusional ,about his reason for being here . Pt is angry that writer was giving him meds he did not order . 'These are not my meds \" and  I am not taking them . He refused Blood sugar checks and his medication . Pt was angry and yelling stating the hospital got him here against his will . Pt was offered PRN Antianxiety meds but he refused  . Pt unable to participate in mental health assessment questions .                  "

## 2023-05-02 NOTE — PLAN OF CARE
Problem: Psychotic Signs/Symptoms  Goal: Improved Behavioral Control (Psychotic Signs/Symptoms)  Outcome: Not Progressing   Goal Outcome Evaluation:  Patient asked me when he would be discharged on the evening shift and it became apparent that he expected a ruling would come from the court this evening (In response to his appearance on day shift) directing staff  to discharge him immediately.  I explained to him that seldom if ever does the court return a ruling the same day as the session.  He initially took this well but returned to team station a few minutes later and began to yell demand that I call a  at home immediately and order them to come to the hospital,meet with him, and issue a ruling tonight.  Patient made this and other unrealistic demands for about twenty minutes, refusing to leave the desk area and not processing feedback that nobody at the hospital has the power to do this.  He also wanted me to call somebody in charge of the [place] police and was angry when this request was also refused.  This is the behavior patient displays when he is upset on evening shift; unsure if processing deficit is related to years of meth use he disclosed to psych associate this weekend, possible TBI, or something else.

## 2023-05-02 NOTE — PLAN OF CARE
"Occupational Therapy Group Note:       05/02/23 1533   Group Therapy Session   Group Attendance attended group session   Time Session Began 1415   Time Session Ended 1500   Total Time (minutes) 45   Total # Attendees 4   Group Type recreation   Group Topic Covered leisure exploration/use of leisure time;structured socialization   Group Session Detail OT Leisure Group: Italo Lockett   Patient Response/Contribution confronted peers appropriately;cooperative with task;listened actively;offered helpful suggestions to peers   Patient Participation Detail Patient engaged in a leisure activity with a visuospatial and cognitive component in order to promote: problem solving skills, improve attention, emphasize new learning, exercise cognitive skills, and foster leisure and relaxation. Patient found pacing the hallway reporting, \"I am so bored.\" Writer personally invited patient to engage in group game activity. Patient was initially hesitant to join, but reported he would come in the group room and watch because he is so bored. With encouragement, patient ended up actively participating in game. Patient presented with high energy and was distractible throughout group. Instructions of game/clarification of game rules needed to be repeated multiple times for patient's benefit. Patient appeared invested in game and offered unprompted and direct advice/strategy to peers. Patient occasionally needed gentle reminders to allow peers the opportunity to make their own choices during their turn. Patient confirmed that engaging in group/game did help to alleviate some feelings of boredom. Affect: congruent. Mood: energetic, cooperative.              "

## 2023-05-02 NOTE — PROGRESS NOTES
"Pt is awake at 6:15 am and was calmly walking in the hallway.  He approached staff to turn in his radio headphones and commented that he slept well after taking the PRN medications at the start of the night.  He stated that it only took 30 minutes for him to fall asleep.    Pt then asked writer \"Can you write down the recipe?\"  He then clarified that he wanted to have the medication combination (Trazodone, Melatonin and Hydroxyzine) prescribed for him when he gets discharged.    No behavioral issues noted at this time.    "

## 2023-05-02 NOTE — PLAN OF CARE
"    Tasks Complete:    Chart review     Team meeting     PT questioned if his insurance \" was really active\". [facility] showed pt a copy of his insurance card and provided the phone number to his insurance . Pt receptive    DA completed and sent to [facility] for case management    Commitment and Jarvised received. Pt provided copy and noted \" I am not  Mentally ill. You guys are going to try to kill me with this medications. Im not taking it\". [facility] provided pt his  phone number.     Commitment and ching paperwork filed in paperchart     Commtiment and ching paperwork emailed to UR      Current Symptoms include the following: See RN note     Addressed patient needs/concerns: See above intervention    Discharge Plan or Goal   Plan  TBD likely discharge home with outpatient services    Commitment  Commited and Jarvised 05/02/2023-11/02/2023    Care Team   No outpatient care team   Father - Gokul Mccoy ([phone])  Civil commitment - Fátima Nunez- [phone]     Barriers to Discharge   Ongoing stabilization  Court hold      Referral Status  PT would benefit from  such as case management, psychiatry, OP programming. No referrals have been made as of yet.      Legal Status  Court hold       "

## 2023-05-02 NOTE — PLAN OF CARE
"Patient was up at the beginning of the shift and visible in the milieu for the majority of the shift.  Social with staff and peers but remains suspicious/paranoid and expressing delusional thinking.  Denied anxiety, depression and all other mental health symptoms. Patient stated \"I just don't wanna be here\".  He was concerned about his insurance status and was directed to his assigned CTC who was able to provide him with information that seemed to satisfy him.  Patient was perseverative on why his blood glucose was 204 and his reasoning is not based in reality. Patient was med compliant but says he will not be taking any antipsychotic medication while he is here.  Refused lab draw x2 this AM stating he is too scared because he passed out the last time he had his blood drawn.  Lab was able to confirm with the technician that diamond his blood the last time that he did indeed lose consciousness.  Patient reports feeling safe and agrees to notify staff if he is experiencing any discomforts.  /68   Pulse 105   Temp 97.5  F (36.4  C)   Resp 16   Wt 88.5 kg (195 lb)   SpO2 99%   BMI 32.45 kg/m      "

## 2023-05-02 NOTE — PLAN OF CARE
"Goal Outcome Evaluation:    Pt behavior escalated after being served commitment and ching papers. He demanded to be discharged and said that staff are trying to kill him with the medications. Pt paranoid and talked about undercover people working against him. Pt emphatically stated he will not take medications and that they harm him. Pt yelling in the hallway stating \"I have to get out of here.\" Pt started to punch and kick the exit door repeatedly. Code Bryce called and pt was given option to take medication and stay in room until calmer or go to seclusion room as pt behavior impulsive and unsafe. Pt choose to take oral Zyprexa and after sat in his room quietly.   This shift pt perseverates about being forced to be in the hospital. Complained that the [place] PD said false things about him and that he has been calm the entire hospital stay. Also said \"just because there are undercover people doesn't mean I am delusional.\" Also asking staff to look up numbers for various Attorneys.   Pt refused blood sugar check. Said they over did it and he complied in hopes to get out of the hospital. Said now they are forcing stupid medication and that he only wants blood sugar check once a day.   Pt ate well at dinner, 100%. Spent time in room napping.   Code green called when ching medication given. Pt argumentative then took oral haldol with water. Pt was then asked to do a mouth check. He swirled the medication in his mouth then said he needed more water. Pt again asked to do a mouth check and was compliant at second request. He refused all his other medication.                           "

## 2023-05-03 LAB — GLUCOSE BLDC GLUCOMTR-MCNC: 198 MG/DL (ref 70–99)

## 2023-05-03 PROCEDURE — 250N000013 HC RX MED GY IP 250 OP 250 PS 637: Performed by: NURSE PRACTITIONER

## 2023-05-03 PROCEDURE — 99232 SBSQ HOSP IP/OBS MODERATE 35: CPT | Performed by: NURSE PRACTITIONER

## 2023-05-03 PROCEDURE — 124N000002 HC R&B MH UMMC

## 2023-05-03 PROCEDURE — 250N000013 HC RX MED GY IP 250 OP 250 PS 637: Performed by: PSYCHIATRY & NEUROLOGY

## 2023-05-03 RX ADMIN — Medication 250 MG: at 09:19

## 2023-05-03 RX ADMIN — FLUTICASONE PROPIONATE 1 SPRAY: 50 SPRAY, METERED NASAL at 20:24

## 2023-05-03 RX ADMIN — METFORMIN ER 500 MG 2000 MG: 500 TABLET ORAL at 08:02

## 2023-05-03 RX ADMIN — HALOPERIDOL 5 MG: 5 TABLET ORAL at 19:44

## 2023-05-03 RX ADMIN — ACETAMINOPHEN 325MG 650 MG: 325 TABLET ORAL at 08:21

## 2023-05-03 ASSESSMENT — ACTIVITIES OF DAILY LIVING (ADL)
ADLS_ACUITY_SCORE: 18
ADLS_ACUITY_SCORE: 18
LAUNDRY: WITH SUPERVISION
LAUNDRY: WITH SUPERVISION
ADLS_ACUITY_SCORE: 18
ADLS_ACUITY_SCORE: 18
ORAL_HYGIENE: INDEPENDENT
ORAL_HYGIENE: INDEPENDENT
ADLS_ACUITY_SCORE: 18
DRESS: INDEPENDENT
ADLS_ACUITY_SCORE: 18
HYGIENE/GROOMING: INDEPENDENT
ADLS_ACUITY_SCORE: 18
ADLS_ACUITY_SCORE: 18
HYGIENE/GROOMING: INDEPENDENT
DRESS: INDEPENDENT

## 2023-05-03 NOTE — PLAN OF CARE
"    Tasks Complete:    Chart review     Team meeting     Amended order authorizing use of neuroleptic medication received. The original order had the respondents date of birth incorrect. This amended order reflect the correct date of birth. CTC spoke with pt about the amended order pt notes that he \" thinks its wrong to force me to take medications. I am not harmful. I have a clean record. I wasn't going to hurt anyone yesterday. I was just mad\". He said he didn't want a copy but would like a copy placed in his folder by his chart.     Copy placed in pt's folder and paper chart    Copy provided to UR      Current Symptoms include the following: See RN note     Addressed patient needs/concerns: See above intervention    Discharge Plan or Goal   Plan  TBD likely discharge home with outpatient services    Commitment  Commited and Jarvised 05/02/2023-11/02/2023  [id number]    Care Team   No outpatient care team   Father - Gokul Mccoy ([phone])  Civil commitment - Fátima Nunez- [phone]     Barriers to Discharge   Ongoing stabilization  Committed and Jarvised      Referral Status  PT would benefit from  such as case management, psychiatry, OP programming. No referrals have been made as of yet.      Legal Status  Court hold       "

## 2023-05-03 NOTE — PLAN OF CARE
"Patient was up at the beginning of the shift, flowsheets indicate he slept for 7 hours, however he says he still feels tired.  Also reported a headache rated 6/10.  Gave tylenol and upon reassessment he says he no longer has a headache but his feet are sore.  He believes this is from walking around too much - encouraged him to use sandals which he has in his room.  Following breakfast he went back in his room for a couple hours and rested in bed.  Social with staff and peers, spent time in the dining area playing chess and watching TV in the lounge.  Remains a bit suspicious/paranoid but less so than previous interactions.  He allowed blood glucose check and insulin to be given without objection.  Denied anxiety, depression and all other mental health symptoms. Patient continues to report; \"I just don't wanna be here\".  Blood glucose was 198.  Patient was med compliant but says he will not be taking any antipsychotic medication while he is here.  Patient reports feeling safe and agrees to notify staff if he is experiencing any discomforts.  /80 (BP Location: Left arm, Patient Position: Sitting, Cuff Size: Adult Regular)   Pulse 93   Temp 98  F (36.7  C) (Temporal)   Resp 16   Wt 88.5 kg (195 lb)   SpO2 96%   BMI 32.45 kg/m      "

## 2023-05-03 NOTE — PROGRESS NOTES
Behavioral Health  Note   Behavioral Health  Spirituality Group Note     Unit 4A    Name: Alexander Mccoy    YOB: 1985   MRN: [id number]    Age: 38 year old     Patient attended -led group, which included discussion of spirituality, coping with illness and building resilience.   Patient attended group for [facility] - spirituality groups are not billed.   patient demonstrated an appreciation of topic's application for their personal circumstances.     [facility]  Staff    Page [phone]

## 2023-05-03 NOTE — PLAN OF CARE
Problem: Psychotic Signs/Symptoms  Goal: Improved Behavioral Control (Psychotic Signs/Symptoms)  5/3/2023 0623 by Orlando Bird, RN  Outcome: Progressing  5/3/2023 0622 by Orlando Bird, RN  Outcome: Progressing     Problem: Anxiety Signs/Symptoms  Goal: Optimized Energy Level (Anxiety Signs/Symptoms)  Outcome: Progressing   Goal Outcome Evaluation:       Pt appears to be sleeping comfortably during all safety checks. No psychotic signs and symptoms noted. Pt slept for a total of 7 hours. Will continue to monitor

## 2023-05-03 NOTE — PROGRESS NOTES
[facility],  Psychiatric Progress Note      Impression:     Alexander Mccoy is a 38-year-old male admitted to [address] on 4/22/2023.  He was admitted on a court hold from the [facility] ER due to psychosis.  He stated that he went to a police station because he believed undercover governmental agents were tracking him.  He said he no longer has a job because the government agents had infiltrated through his phone and computer.  He became involved in an altercation with the  at the [facility].  UTOX was negative and he denies substance abuse.  He was not taking any psychotropic medications prior to admission.  While in the ER, he was intermittently agitated.  He called 911.  During his time in the ER, a petition for commitment MI with [facility] was filed in [facility], and he was placed on a court hold.  He has type 2 diabetes.  HbA1c was 10.9.  He reports taking Metformin XR prior to admission.  While in the ER, he was inconsistently adherent to insulin and BG checks.  During the admission assessment, he reports rarely checking his blood glucose and states that his BG never exceeds 200, however BGs have often been over 300 since he was in the ER, which he relates to stress.  He says if he were not in the hospital, they would be lower.   He was not taking any psychotropic medications prior to admission.  Abilify was initiated in the ER, but he refused the second dose.  He states he will not take psychotropic medications nor consent to labs or insulin while hospitalized.  Throughout the conversation, he rarely responded directly to provider's inquiries and instead made comments about being tracked and the injustices of being hospitalized.  Since admission, Abilify was increased, and he refused 3/4 doses.  Abilify was then discontinued and replaced with Haldol; he has taken 2 doses and has refused the rest.  Melatonin  was initiated.  PRNs of Trazodone, Hydroxyzine and Zyprexa were initiated.  He initially refused BG checks and insulin but has been adhering since the evening of 4/22 onward.  He has been calmer and less irritable.  He continues to have paranoid delusions and poor insight.  He has been sleeping poorly.  He is committed and Jarvised as of 5/2.  Upon receiving court paperwork, he kicked and punched the exit door and a behavioral code was called.  He subsequently was agreeable to taking medications and going to his room, so he was not placed in seclusion.            Diagnoses:     Unspecified psychosis, likely paranoid schizophrenia  Type 2 diabetes, insulin dependent         Plan:     Medications:  Continue Haldol 5 mg PO at HS with a back up of IM per Kristofer.  Continue Melatonin 3 mg at HS.  PRNs of Hydroxyzine, Zyprexa and Trazodone are available.    He is committed MI with [facility] (Abilify, Risperdal, Haldol, Zyprexa) in [facility].     Internal medicine continuing to follow for diabetes management.    First episode psychosis labs were unremarkable.     He lives with his parents.  Consider IRTS placement.  He does not have outpatient providers. Plan for ENT referral for tinnitus.        Attestation:  Patient has been seen and evaluated by me, CLARA Anthony CNP  The patient was counseled on nature of illness and treatment plan/options  Care was coordinated with treatment team, internal medicine  Total time > 35 minutes           Interim History:     The patient's care was discussed with the treatment team and chart notes were reviewed.  Pt was documented as sleeping 7 hours during the overnight shift.  He attended 1 group yesterday.  He consented to 1 BG check, which was 204.  He refused scheduled Melatonin and Flonase.  After receiving his court paperwork yesterday, he said that staff are trying to kill him with antipsychotic medications.  He hit and kicked the exit door and stated he wanted to  "leave.  A behavioral code was called.  He was agreeable to taking PRN Zyprexa and remaining in his room.  He later took scheduled PO Haldol.  Today, pt reports feeling \"terrible after I took meds.\"  States he felt tired and lazy.  States he also had a headache.  Pt informed Tylenol and Ibuprofen are available.  He responded that he is concerned about his liver.  \"You're hurting each of my organs.\"  Pt believes his  was working against him and wants a new  to appeal the outcome of his case.  He intends to cooperate with PO Haldol.  He said, \"I don't want to fight back. There's no way out of here.   will bring me back.\"  Pt asked to use a computer or his phone to cancel some streaming services so he can save money, and this was approved.             Medications:     Current Facility-Administered Medications   Medication     acetaminophen (TYLENOL) tablet 650 mg     alum & mag hydroxide-simethicone (MAALOX) suspension 30 mL     benztropine (COGENTIN) tablet 1 mg     glucose gel 15-30 g    Or     dextrose 50 % injection 25-50 mL    Or     glucagon injection 1 mg     fluticasone (FLONASE) 50 MCG/ACT spray 1 spray     haloperidol (HALDOL) tablet 5 mg    Or     haloperidol lactate (HALDOL) injection 5 mg     hydrOXYzine (ATARAX) tablet 25 mg     ibuprofen (ADVIL/MOTRIN) tablet 600 mg     insulin glargine (LANTUS PEN) injection 13 Units     melatonin tablet 3 mg     metFORMIN (GLUCOPHAGE XR) 24 hr tablet 2,000 mg     OLANZapine (zyPREXA) tablet 10 mg    Or     OLANZapine (zyPREXA) injection 10 mg     senna-docusate (SENOKOT-S/PERICOLACE) 8.6-50 MG per tablet 1 tablet     sodium chloride (OCEAN) 0.65 % nasal spray 1 spray     traZODone (DESYREL) tablet 50 mg     vitamin C (ASCORBIC ACID) tablet 250 mg             Allergies:   No Known Allergies         Psychiatric Examination:     BP (!) 156/95 (BP Location: Right arm)   Pulse 105   Temp 97.9  F (36.6  C) (Oral)   Resp 16   Wt 88.5 kg (195 lb)   SpO2 " 97%   BMI 32.45 kg/m      Appearance:  awake, alert, fair grooming, dressed in scrubs  Attitude:  cooperative  Eye Contact:  fair  Mood:  frustrated  Affect:  increased intensity  Speech:  clear, coherent, normal volume   Psychomotor Behavior:  no evidence of tardive dyskinesia, dystonia, or tics  Thought Process:  illogical, somewhat disorganized  Associations:  no loose associations  Thought Content:  denies suicidal and homicidal ideation, paranoid delusions are present, denies hallucinations  Insight:  poor  Judgment:  limited  Oriented to:  date, time, person, and place  Attention Span and Concentration:  fair  Recent and Remote Memory:  fair  Language:  intact, fluent English  Fund of Knowledge:  appropriate  Muscle Strength and Tone:  normal  Gait and Station:  normal          Labs:     Recent Results (from the past 24 hour(s))   Glucose by meter    Collection Time: 05/03/23  8:07 AM   Result Value Ref Range    GLUCOSE BY METER POCT 198 (H) 70 - 99 mg/dL

## 2023-05-03 NOTE — PLAN OF CARE
"Goal Outcome Evaluation:    Plan of Care Reviewed With: patient          Pt states he is fine. Observed walking the hallway listening to headphones. Complained about the hospital and said he will not be taking medication. Attended the community meeting. Pt making paranoid statements that \"they lied\" and \"they have no right to put me here.\" \"I want to rolanda the police station and the hospital too for wrongly putting me here.\" Mood is labile pt presenting as intermittently angry and other times calm. Speech is tangential and less hyper verbal. Pt called the [place] Police department x1 this shift. Pt asked not to call PD. Was argumentative and defensive and talked about police pulling out their tazer.   At hs pt argumentative and paranoid said he will die in his sleep if he takes medication. Staff talked with patient and he then he took oral Haldol. Refused Melatonin, said he may decide to ask for this later if needed.                    "

## 2023-05-04 LAB — GLUCOSE BLDC GLUCOMTR-MCNC: 213 MG/DL (ref 70–99)

## 2023-05-04 PROCEDURE — 250N000013 HC RX MED GY IP 250 OP 250 PS 637: Performed by: NURSE PRACTITIONER

## 2023-05-04 PROCEDURE — 250N000013 HC RX MED GY IP 250 OP 250 PS 637: Performed by: PSYCHIATRY & NEUROLOGY

## 2023-05-04 PROCEDURE — 99232 SBSQ HOSP IP/OBS MODERATE 35: CPT | Performed by: NURSE PRACTITIONER

## 2023-05-04 PROCEDURE — G0177 OPPS/PHP; TRAIN & EDUC SERV: HCPCS

## 2023-05-04 PROCEDURE — 124N000002 HC R&B MH UMMC

## 2023-05-04 RX ADMIN — Medication 250 MG: at 08:15

## 2023-05-04 RX ADMIN — HALOPERIDOL 5 MG: 5 TABLET ORAL at 21:07

## 2023-05-04 RX ADMIN — TRAZODONE HYDROCHLORIDE 50 MG: 50 TABLET ORAL at 23:36

## 2023-05-04 RX ADMIN — ACETAMINOPHEN 325MG 650 MG: 325 TABLET ORAL at 13:35

## 2023-05-04 RX ADMIN — METFORMIN ER 500 MG 2000 MG: 500 TABLET ORAL at 08:15

## 2023-05-04 RX ADMIN — HYDROXYZINE HYDROCHLORIDE 25 MG: 25 TABLET, FILM COATED ORAL at 23:35

## 2023-05-04 ASSESSMENT — ACTIVITIES OF DAILY LIVING (ADL)
DRESS: INDEPENDENT
ADLS_ACUITY_SCORE: 18
LAUNDRY: WITH SUPERVISION
ADLS_ACUITY_SCORE: 18
ADLS_ACUITY_SCORE: 18
HYGIENE/GROOMING: INDEPENDENT
ADLS_ACUITY_SCORE: 18
DRESS: INDEPENDENT
LAUNDRY: WITH SUPERVISION
ORAL_HYGIENE: INDEPENDENT
HYGIENE/GROOMING: INDEPENDENT
ORAL_HYGIENE: INDEPENDENT

## 2023-05-04 NOTE — PROGRESS NOTES
[facility],  Psychiatric Progress Note      Impression:     Alexander Mccoy is a 38-year-old male admitted to [address] on 4/22/2023.  He was admitted on a court hold from the [facility] ER due to psychosis.  He stated that he went to a police station because he believed undercover governmental agents were tracking him.  He said he no longer has a job because the government agents had infiltrated through his phone and computer.  He became involved in an altercation with the  at the [facility].  UTOX was negative and he denies substance abuse.  He was not taking any psychotropic medications prior to admission.  While in the ER, he was intermittently agitated.  He called 911.  During his time in the ER, a petition for commitment MI with [facility] was filed in [facility], and he was placed on a court hold.  He has type 2 diabetes.  HbA1c was 10.9.  He reports taking Metformin XR prior to admission.  While in the ER, he was inconsistently adherent to insulin and BG checks.  During the admission assessment, he reports rarely checking his blood glucose and states that his BG never exceeds 200, however BGs have often been over 300 since he was in the ER, which he relates to stress.  He says if he were not in the hospital, they would be lower.   He was not taking any psychotropic medications prior to admission.  Abilify was initiated in the ER, but he refused the second dose.  He states he will not take psychotropic medications nor consent to labs or insulin while hospitalized.  Throughout the conversation, he rarely responded directly to provider's inquiries and instead made comments about being tracked and the injustices of being hospitalized.  Since admission, Abilify was increased, and he refused 3/4 doses.  Abilify was then discontinued and replaced with Haldol; he took 2 doses and then refused the rest until Kristofer was  enacted.  Melatonin was initiated.  PRNs of Trazodone, Hydroxyzine and Zyprexa were initiated.  He initially refused BG checks and insulin but has been adhering to insulin administration and more than 50% of BG checks since the evening of 4/22 onward.  He is committed and Jarvised as of 5/2.  Upon receiving court paperwork, he kicked and punched the exit door and a behavioral code was called.  He subsequently was agreeable to taking medications and going to his room, so he was not placed in seclusion.  He continues to have paranoid delusions and poor insight.  Sleep is improved since he began taking Haldol.          Diagnoses:     Unspecified psychosis, likely paranoid schizophrenia  Type 2 diabetes, insulin dependent         Plan:     Medications:  Continue Haldol 5 mg PO at HS with a back up of IM per Kristofer; plan to increase.  Continue Melatonin 3 mg at HS.  PRNs of Hydroxyzine, Zyprexa and Trazodone are available.    He is committed MI with [facility] (Abilify, Risperdal, Haldol, Zyprexa) in [facility].     Internal medicine continuing to follow for diabetes management.    First episode psychosis labs were unremarkable.     He lives with his parents.  Likely plan for IRTS placement when stable.  He does not have outpatient providers. Plan for ENT referral for tinnitus and ongoing nasal congesiton.        Attestation:  Patient has been seen and evaluated by me, Savannah Pope, APRN CNP  The patient was counseled on nature of illness and treatment plan/options  Care was coordinated with treatment team, internal medicine  Total time > 35 minutes           Interim History:     The patient's care was discussed with the treatment team and chart notes were reviewed.  Pt was documented as sleeping 7 hours during the overnight shift.  He attended 1 group yesterday.  He refused Melatonin and 1 dose of Flonase.  He took PRN Tylenol.  He accepted PO Haldol.  He agreed to 1 BG check with a result of 198.  Staff  "report he was labile and made paranoid statements.  He called the [place] [employer].  \"I had to talk to them about why they put me here, just for raising my voice at the officer and talking about undercovers.  They got something to cover up.  Do they have a deal with this hospital where they send people here to get money?  I hate these undercovers.  They keep messing with me.\"  Pt reports that he felt \"tired and drained\" and had nightmares after taking Haldol.  He has been sleeping better since taking it.  Provider asked him if he wants to discontinue Melatonin since he has been refusing it, but he was disorganized and did not provide a coherent response.  Pt states that he doesn't want to complete a blood draw for lipids and doesn't need to take the Pravastatin which he previously requested, so this order was cancelled.           Medications:     Current Facility-Administered Medications   Medication     acetaminophen (TYLENOL) tablet 650 mg     alum & mag hydroxide-simethicone (MAALOX) suspension 30 mL     benztropine (COGENTIN) tablet 1 mg     glucose gel 15-30 g    Or     dextrose 50 % injection 25-50 mL    Or     glucagon injection 1 mg     fluticasone (FLONASE) 50 MCG/ACT spray 1 spray     haloperidol (HALDOL) tablet 5 mg    Or     haloperidol lactate (HALDOL) injection 5 mg     hydrOXYzine (ATARAX) tablet 25 mg     ibuprofen (ADVIL/MOTRIN) tablet 600 mg     insulin glargine (LANTUS PEN) injection 13 Units     melatonin tablet 3 mg     metFORMIN (GLUCOPHAGE XR) 24 hr tablet 2,000 mg     OLANZapine (zyPREXA) tablet 10 mg    Or     OLANZapine (zyPREXA) injection 10 mg     senna-docusate (SENOKOT-S/PERICOLACE) 8.6-50 MG per tablet 1 tablet     sodium chloride (OCEAN) 0.65 % nasal spray 1 spray     traZODone (DESYREL) tablet 50 mg     vitamin C (ASCORBIC ACID) tablet 250 mg             Allergies:   No Known Allergies         Psychiatric Examination:     /68 (BP Location: Left arm, Patient " Position: Sitting, Cuff Size: Adult Regular)   Pulse 68   Temp 98.8  F (37.1  C) (Temporal)   Resp 20   Wt 88.5 kg (195 lb)   SpO2 97%   BMI 32.45 kg/m      Appearance:  awake, alert, fair grooming, dressed in scrubs  Attitude:  cooperative  Eye Contact:  fair  Mood:  frustrated  Affect:  increased intensity  Speech:  clear, coherent, normal volume   Psychomotor Behavior:  no evidence of tardive dyskinesia, dystonia, or tics  Thought Process:  illogical, disorganized  Associations:  no loose associations  Thought Content:  denies suicidal and homicidal ideation, paranoid delusions are present, denies hallucinations  Insight:  poor  Judgment:  limited  Oriented to:  date, time, person, and place  Attention Span and Concentration:  fair  Recent and Remote Memory:  fair  Language:  intact, fluent English  Fund of Knowledge:  appropriate  Muscle Strength and Tone:  normal  Gait and Station:  normal          Labs:     Recent Results (from the past 24 hour(s))   Glucose by meter    Collection Time: 05/04/23  8:09 AM   Result Value Ref Range    GLUCOSE BY METER POCT 213 (H) 70 - 99 mg/dL

## 2023-05-04 NOTE — PLAN OF CARE
Problem: Psychotic Signs/Symptoms  Goal: Improved Behavioral Control (Psychotic Signs/Symptoms)  Outcome: Progressing   Goal Outcome Evaluation:    Plan of Care Reviewed With: patient        Pt said he is feeling tired today otherwise fine. Pt asked for his blood sugar check -213. Took all scheduled medication other than Flonase. Pt social with peers. Ate adequately at meal. He napped in his room after breakfast. Attended group. Pt complained about food tray. He has not been protesting about medications or talking about the police department as he has in recent past.

## 2023-05-04 NOTE — PLAN OF CARE
"Goal Outcome Evaluation:    Plan of Care Reviewed With: patient      Pt present in the milieu, occasionally social with peers. Talked about the unfairness of being forced to take medications. Declined afternoon blood sugar and said he only gets it done in the morning. Pt guarded upon approach. He was asked if his hand was feeling better. Pt denied having any issues with his hand said no pain, anxiety, depression and SI/HI/HA. Pt quick to dismiss writer and said \"well thanks for the talk\". Attended group and played video games. After administering Haldol pt was asked for mouth check. He stated he had peanut butter in his mouth. Again asked for mouth check and was compliant with latter request. Pt then complained for a short time regarding how bored he is and how unfair it is that he is in the hospital and cannot leave then went to his room. Refused Melatonin and Flonase. Pt requires no room mate order due to paranoia, delusions and insomnia.                    "

## 2023-05-04 NOTE — PLAN OF CARE
OT PROGRESS NOTE:     05/04/23 1256   Group Therapy Session   Group Attendance attended group session   Time Session Began 1115   Time Session Ended 1200   Total Time (minutes) 45   Total # Attendees 7   Group Type recreation;task skill   Group Topic Covered balanced lifestyle;coping skills/lifestyle management;leisure exploration/use of leisure time;problem-solving;structured socialization   Group Session Detail Apples to apples to promote social interaction, exploration of leisure activities for structure, decision making, fun and use of executive skills for improved self management.   Patient Response/Contribution able to recall/repeat info presented;cooperative with task;expressed understanding of topic;organized   Patient Participation Detail Initiated group and was eager to engage in familiar group activity. Bright, pleasant and social entire group time. Full range of affect. Attentive to details and turn taking. Laughed and added to the insights of group. Good sense of humor.

## 2023-05-04 NOTE — PLAN OF CARE
OT PROGRESS NOTE:     05/04/23 1256   Group Therapy Session   Group Attendance attended group session   Time Session Began 1115   Time Session Ended 1200   Total Time (minutes) 45   Total # Attendees 7   Group Type recreation;task skill   Group Topic Covered balanced lifestyle;coping skills/lifestyle management;leisure exploration/use of leisure time;problem-solving;structured socialization   Group Session Detail Apples to apples to promote social interaction, exploration of leisure activities for structure, decision making, fun and use of executive skills for improved self management.   Patient Response/Contribution able to recall/repeat info presented;cooperative with task;expressed understanding of topic;organized   Patient Participation Detail Attended leisure group following general announcement that group was starting. Attentive and engaged entire group session. Full range of affect. Thoughts and comments were focused and appropriate for the most part. At one point over analyzed why a specific card was chosen as the winner. Easily redirected to focus/judges right to choose the winner. Pleasant and social with peers. Spontaneous in decision making and keeping track of turn taking. Was a task master regarding groups duration and possible over lap with lunch. Shared his personal leisure interests and activities with the group.

## 2023-05-04 NOTE — PLAN OF CARE
Goal Outcome Evaluation:           Problem: Sleep Disturbance  Goal: Adequate Sleep/Rest  Outcome: Progressing       Patient appeared to be asleep for 7 hours during safety checks this shift. No complaints or concerns voiced by patient or noted by staff. Will continue to monitor and update if there are changes.

## 2023-05-05 PROCEDURE — 250N000013 HC RX MED GY IP 250 OP 250 PS 637: Performed by: NURSE PRACTITIONER

## 2023-05-05 PROCEDURE — 124N000002 HC R&B MH UMMC

## 2023-05-05 PROCEDURE — 99232 SBSQ HOSP IP/OBS MODERATE 35: CPT | Performed by: NURSE PRACTITIONER

## 2023-05-05 RX ORDER — HALOPERIDOL 5 MG/ML
5 INJECTION INTRAMUSCULAR EVERY EVENING
Status: DISCONTINUED | OUTPATIENT
Start: 2023-05-05 | End: 2023-05-11

## 2023-05-05 RX ORDER — HALOPERIDOL 10 MG/1
10 TABLET ORAL EVERY EVENING
Status: DISCONTINUED | OUTPATIENT
Start: 2023-05-05 | End: 2023-05-11

## 2023-05-05 RX ORDER — LANOLIN ALCOHOL/MO/W.PET/CERES
3 CREAM (GRAM) TOPICAL
Status: DISCONTINUED | OUTPATIENT
Start: 2023-05-05 | End: 2023-06-06 | Stop reason: HOSPADM

## 2023-05-05 RX ADMIN — HALOPERIDOL 10 MG: 10 TABLET ORAL at 19:03

## 2023-05-05 RX ADMIN — Medication 250 MG: at 08:07

## 2023-05-05 RX ADMIN — METFORMIN ER 500 MG 2000 MG: 500 TABLET ORAL at 08:07

## 2023-05-05 RX ADMIN — FLUTICASONE PROPIONATE 1 SPRAY: 50 SPRAY, METERED NASAL at 08:08

## 2023-05-05 RX ADMIN — IBUPROFEN 600 MG: 600 TABLET ORAL at 22:35

## 2023-05-05 RX ADMIN — FLUTICASONE PROPIONATE 1 SPRAY: 50 SPRAY, METERED NASAL at 19:03

## 2023-05-05 RX ADMIN — SALINE NASAL SPRAY 1 SPRAY: 1.5 SOLUTION NASAL at 08:08

## 2023-05-05 RX ADMIN — Medication 3 MG: at 21:35

## 2023-05-05 ASSESSMENT — ACTIVITIES OF DAILY LIVING (ADL)
ADLS_ACUITY_SCORE: 18
DRESS: INDEPENDENT
ADLS_ACUITY_SCORE: 18
LAUNDRY: WITH SUPERVISION
ADLS_ACUITY_SCORE: 18
ORAL_HYGIENE: INDEPENDENT
ADLS_ACUITY_SCORE: 18
LAUNDRY: WITH SUPERVISION
ADLS_ACUITY_SCORE: 18
DRESS: INDEPENDENT
ADLS_ACUITY_SCORE: 18
HYGIENE/GROOMING: INDEPENDENT
ADLS_ACUITY_SCORE: 18
ORAL_HYGIENE: INDEPENDENT
HYGIENE/GROOMING: INDEPENDENT
ADLS_ACUITY_SCORE: 18

## 2023-05-05 NOTE — PROGRESS NOTES
Pt awake at the start of the shift, irritable, angry for being here and feeling like he is in a cage. Pt vocalized wanting to leave and blames the police for being here. Pt requested/given prn Trazodone and Hydroxyzine. Pt went to the lounge. Few minutes later, pt walked to his room, banged his door loudly. Pt appears to be sleeping 5 hrs. Will continue to monitor.

## 2023-05-05 NOTE — PROGRESS NOTES
[facility],  Psychiatric Progress Note      Impression:     Alexander Mccoy is a 38-year-old male admitted to [address] on 4/22/2023.  He was admitted on a court hold from the [facility] ER due to psychosis.  He stated that he went to a police station because he believed undercover governmental agents were tracking him.  He said he no longer has a job because the government agents had infiltrated through his phone and computer.  He became involved in an altercation with the  at the [facility].  UTOX was negative and he denies substance abuse.  He was not taking any psychotropic medications prior to admission.  While in the ER, he was intermittently agitated.  He called 911.  During his time in the ER, a petition for commitment MI with [facility] was filed in [facility], and he was placed on a court hold.  He has type 2 diabetes.  HbA1c was 10.9.  He reports taking Metformin XR prior to admission.  While in the ER, he was inconsistently adherent to insulin and BG checks.  During the admission assessment, he reports rarely checking his blood glucose and states that his BG never exceeds 200, however BGs have often been over 300 since he was in the ER, which he relates to stress.  He says if he were not in the hospital, they would be lower.   He was not taking any psychotropic medications prior to admission.  Abilify was initiated in the ER, but he refused the second dose.  He states he will not take psychotropic medications nor consent to labs or insulin while hospitalized.  Throughout the conversation, he rarely responded directly to provider's inquiries and instead made comments about being tracked and the injustices of being hospitalized.  Since admission, Abilify was increased, and he refused 3/4 doses.  Abilify was then discontinued and replaced with Haldol; he took 2 doses and then refused the rest until Kristofer was  enacted.  PRNs of Melatonin, Hydroxyzine and Zyprexa were initiated.  He initially refused BG checks and insulin but has been adhering since the evening of 4/22 onward.  He has been calmer and less irritable.  He continues to have paranoid delusions and poor insight.  He has been sleeping poorly.  He is committed and Jarvised as of 5/2.  Upon receiving court paperwork, he kicked and punched the exit door and a behavioral code was called.  He subsequently was agreeable to taking medications and going to his room, so he was not placed in seclusion.            Diagnoses:     Unspecified psychosis, likely paranoid schizophrenia  Type 2 diabetes, insulin dependent         Plan:     Medications:  Increase Haldol to 10 mg PO at HS with a back up of 5 mg IM per Kristofer.  Change Melatonin 3 mg from scheduled to PRN.  Continue PRNs of Hydroxyzine and Zyprexa.    He is committed MI with [facility] (Abilify, Risperdal, Haldol, Zyprexa) in [facility].     Internal medicine continuing to follow for diabetes management.    First episode psychosis labs were unremarkable.     He lives with his parents.  Consider IRTS placement.  He does not have outpatient providers. Plan for ENT referral for tinnitus.        Attestation:  Patient has been seen and evaluated by me, CLARA Anthony CNP  The patient was counseled on nature of illness and treatment plan/options  Care was coordinated with treatment team  Total time > 35 minutes           Interim History:     The patient's care was discussed with the treatment team and chart notes were reviewed.  Pt was documented as sleeping 5 hours during the overnight shift.  He attended 1 group yesterday.  He spent time playing games on the [employer].  He refused Melatonin and Flonase.  He took PRNs of Trazodone, Hydroxyzine and Tylenol.  Pt would like Melatonin to be PRN rather than scheduled.  During the overnight shift he was rather agitated, made paranoid statements, and banged his door.   "Pt reports ongoing frustration related to being hospitalized.  \"This place is like emptiness.  Everything is so boring.\"  Pt reports difficulty sleeping on the soft care mattress because it retains too much heat.  He moved to sleep on the other bed in his room.  He reports ongoing back pain.  Pt said, \"The doctor misdiagnosed me because the doctor is greedy.  I'm not psychotic and I never have been.  The doctor and the court all plotted against me and that's why I'm mad.\"  Provider apprised him of plan to increase Haldol to 10 mg.  Pt expressed opposition to this.  \"The court said I only needed to take 5 mg.\"  Provider informed him that 5 mg is a very small dose, unlikely to adequately treat symptoms, and that dosing is not determined by the court, but rather at the discretion of treating provider.           Medications:     Current Facility-Administered Medications   Medication     acetaminophen (TYLENOL) tablet 650 mg     alum & mag hydroxide-simethicone (MAALOX) suspension 30 mL     benztropine (COGENTIN) tablet 1 mg     glucose gel 15-30 g    Or     dextrose 50 % injection 25-50 mL    Or     glucagon injection 1 mg     fluticasone (FLONASE) 50 MCG/ACT spray 1 spray     haloperidol (HALDOL) tablet 10 mg    Or     haloperidol lactate (HALDOL) injection 5 mg     hydrOXYzine (ATARAX) tablet 25 mg     ibuprofen (ADVIL/MOTRIN) tablet 600 mg     insulin glargine (LANTUS PEN) injection 13 Units     melatonin tablet 3 mg     metFORMIN (GLUCOPHAGE XR) 24 hr tablet 2,000 mg     OLANZapine (zyPREXA) tablet 10 mg    Or     OLANZapine (zyPREXA) injection 10 mg     senna-docusate (SENOKOT-S/PERICOLACE) 8.6-50 MG per tablet 1 tablet     sodium chloride (OCEAN) 0.65 % nasal spray 1 spray     vitamin C (ASCORBIC ACID) tablet 250 mg             Allergies:   No Known Allergies         Psychiatric Examination:     /87 (BP Location: Right arm, Patient Position: Sitting, Cuff Size: Adult Regular)   Pulse 101   Temp 97.3  F " (36.3  C) (Temporal)   Resp 15   Wt 88.5 kg (195 lb)   SpO2 98%   BMI 32.45 kg/m      Appearance:  awake, alert, fair grooming, dressed in scrubs  Attitude:  cooperative  Eye Contact:  fair  Mood:  frustrated  Affect:  increased intensity  Speech:  clear, coherent, normal volume   Psychomotor Behavior:  no evidence of tardive dyskinesia, dystonia, or tics  Thought Process:  illogical, somewhat disorganized   Associations:  no loose associations  Thought Content:  denies suicidal and homicidal ideation, paranoid delusions are present, denies hallucinations  Insight:  poor  Judgment:  limited  Oriented to:  date, time, person, and place  Attention Span and Concentration:  fair  Recent and Remote Memory:  fair  Language:  intact, fluent English  Fund of Knowledge:  appropriate  Muscle Strength and Tone:  normal  Gait and Station:  normal          Labs:     No results found for this or any previous visit (from the past 24 hour(s)).

## 2023-05-05 NOTE — PLAN OF CARE
"  Problem: Psychotic Signs/Symptoms  Goal: Improved Behavioral Control (Psychotic Signs/Symptoms)  Outcome: Progressing   Goal Outcome Evaluation:    Patient was up for breakfast, blood sugar at 0800 was 156 patient was given his scheduled LANTUS, and took all AM scheduled medications. Patient told writer he doesn't want his HALDOL  increased because it is not doing nothing for him. Staff nurse encouraged patient  to discussed his concern with the provider, and he did. Provider told patient the medication takes  time to work.    Patient denied SI,SIB, and stated, \"I am not depressed, but I am disappointed with this place because my HALDOL  was increased.  There is nothing wrong with me\". Other than that patient was calmed  and cooperative no behavior concerns.                         "

## 2023-05-06 LAB — GLUCOSE BLDC GLUCOMTR-MCNC: 225 MG/DL (ref 70–99)

## 2023-05-06 PROCEDURE — 124N000002 HC R&B MH UMMC

## 2023-05-06 PROCEDURE — 250N000013 HC RX MED GY IP 250 OP 250 PS 637: Performed by: NURSE PRACTITIONER

## 2023-05-06 RX ADMIN — FLUTICASONE PROPIONATE 1 SPRAY: 50 SPRAY, METERED NASAL at 08:33

## 2023-05-06 RX ADMIN — Medication 3 MG: at 23:19

## 2023-05-06 RX ADMIN — METFORMIN ER 500 MG 2000 MG: 500 TABLET ORAL at 08:33

## 2023-05-06 RX ADMIN — HALOPERIDOL 10 MG: 10 TABLET ORAL at 20:21

## 2023-05-06 RX ADMIN — FLUTICASONE PROPIONATE 1 SPRAY: 50 SPRAY, METERED NASAL at 20:22

## 2023-05-06 RX ADMIN — IBUPROFEN 600 MG: 600 TABLET ORAL at 14:25

## 2023-05-06 RX ADMIN — Medication 250 MG: at 08:34

## 2023-05-06 ASSESSMENT — ACTIVITIES OF DAILY LIVING (ADL)
DRESS: INDEPENDENT
ADLS_ACUITY_SCORE: 18
HYGIENE/GROOMING: INDEPENDENT
ADLS_ACUITY_SCORE: 18
HYGIENE/GROOMING: INDEPENDENT
LAUNDRY: WITH SUPERVISION
ADLS_ACUITY_SCORE: 18
ORAL_HYGIENE: INDEPENDENT
ORAL_HYGIENE: INDEPENDENT
ADLS_ACUITY_SCORE: 18
DRESS: INDEPENDENT
LAUNDRY: WITH SUPERVISION

## 2023-05-06 NOTE — PROGRESS NOTES
Brief Medicine Note    Following patient for diabetes mellitus type 2.    Today's vital signs, medications, and nursing notes were reviewed.    Laboratory and Imaging studies reviewed in the results review section of Epic. Pertinent studies are as below:       Recent Labs   Lab 05/06/23  0827 05/04/23  0809 05/03/23  0807 05/02/23  0808 05/01/23  0753 04/30/23  0820   * 213* 198* 204* 167* 172*         /86 (BP Location: Right arm)   Pulse 85   Temp 97.3  F (36.3  C) (Temporal)   Resp 18   Wt 88.5 kg (195 lb)   SpO2 98%   BMI 32.45 kg/m    General: A&O. NAD.       A/P:    Diabetes mellitus type 2 (hemoglobin A1c 10.9 on 4/28/2023)  Originally when writer saw patient on date of consult, he asked to be switched to short acting metformin 1000 mg twice daily.  Psychiatry changed him back to metformin XL 2000 mg daily.  Initiated Lantus this admission and started with 10 units and increased to 13 units.  Blood sugars remain poorly controlled.  -Increase Lantus to 15 units daily  -POCT blood glucose checks twice daily and as needed  -Hypoglycemia protocol  -Recheck hemoglobin A1c in 3 months    CLARA Menon CNP  Internal Medicine [facility] Hospitalist  Page job code [id number] (3B), [phone] (3A), or [phone] ([facility] and 4A)  Text paging via [email or web address] is appreciated  May 6, 2023

## 2023-05-06 NOTE — PLAN OF CARE
"Patient was up at the beginning of the shift, flowsheets indicate he slept for 7 hours, however he says he still feels tired.  Reported no pain this morning.   Following breakfast he went back in his room for a couple hours and rested in bed.  Social with staff and peers, spent time in the dining area reading and watching TV in the lounge.  Remains a bit suspicious/paranoid but less so than previous interactions.  He allowed blood glucose check and insulin to be given without objection.  Denied anxiety, depression and all other mental health symptoms. Patient continues to report; \"I just don't wanna be here\".  Blood glucose was 225.  Patient was med compliant.  Patient reports feeling safe and agrees to notify staff if he is experiencing any discomforts.  /86 (BP Location: Right arm)   Pulse 85   Temp 97.3  F (36.3  C) (Temporal)   Resp 18   Wt 88.5 kg (195 lb)   SpO2 98%   BMI 32.45 kg/m      "

## 2023-05-06 NOTE — PLAN OF CARE
Problem: Psychotic Signs/Symptoms  Goal: Improved Behavioral Control (Psychotic Signs/Symptoms)  Outcome: Progressing     Problem: Anxiety Signs/Symptoms  Goal: Optimized Energy Level (Anxiety Signs/Symptoms)  Outcome: Progressing  Intervention: Optimize Energy Level  Activity (Behavioral Health): up ad maida   Goal Outcome Evaluation:    Plan of Care Reviewed With: patient      Patient appeared restrictive, guarded and mistrustful. Patient was easily irritable as well. Patient's affect was flat, alert and oriented x 3. Patient disorganized and disoriented to situation. Patient was minimally social, he spent a lot of time playing the X-Box. Patient accepted Ibuprofen 600 mg prn for  Pain 6/10, Patient refused his HS blood sugar checks, denies anxiety and depression. No SI/SIB/AVH during this shift.  Patient eating adequately, medication compliant and no stated side effects. Will continue to monitor.

## 2023-05-06 NOTE — PLAN OF CARE
Problem: Suicidal Behavior  Goal: Suicidal Behavior is Absent or Managed  Outcome: Progressing     Problem: Psychotic Signs/Symptoms  Goal: Improved Behavioral Control (Psychotic Signs/Symptoms)  Outcome: Progressing   Goal Outcome Evaluation:      Pt said his mood is fine and that he is frustrated that the Haldol was increased. Said it makes him too tired especially in the morning and that he is just now this afternoon feeling more awake. Pt also talked about being a non violent person, said he has never hurt anyone. Said that medications make him angry. Pt continues to decline afternoon blood sugar check, said there is no point in checking it because he does not get medication in the afternoon for diabetes and he is getting poked too many times. Reports the Ibuprofen he had earlier was helpful for his back pain and its now tolerable and does not need further intervention. Ate adequately at meal. Played video games, used the exercise bike, spent time in room and attended group for a few minutes then left. Compliant with medication and mouth check. No roommate due to severe paranoia, delusional thoughts and insomnia.

## 2023-05-07 LAB
GLUCOSE BLDC GLUCOMTR-MCNC: 243 MG/DL (ref 70–99)
GLUCOSE BLDC GLUCOMTR-MCNC: 299 MG/DL (ref 70–99)

## 2023-05-07 PROCEDURE — 250N000013 HC RX MED GY IP 250 OP 250 PS 637: Performed by: NURSE PRACTITIONER

## 2023-05-07 PROCEDURE — 250N000013 HC RX MED GY IP 250 OP 250 PS 637: Performed by: PSYCHIATRY & NEUROLOGY

## 2023-05-07 PROCEDURE — 124N000002 HC R&B MH UMMC

## 2023-05-07 RX ADMIN — ACETAMINOPHEN 325MG 650 MG: 325 TABLET ORAL at 13:17

## 2023-05-07 RX ADMIN — ACETAMINOPHEN 325MG 650 MG: 325 TABLET ORAL at 22:24

## 2023-05-07 RX ADMIN — IBUPROFEN 600 MG: 600 TABLET ORAL at 10:22

## 2023-05-07 RX ADMIN — INSULIN GLARGINE 15 UNITS: 100 INJECTION, SOLUTION SUBCUTANEOUS at 08:44

## 2023-05-07 RX ADMIN — Medication 3 MG: at 22:43

## 2023-05-07 RX ADMIN — HALOPERIDOL 10 MG: 10 TABLET ORAL at 20:27

## 2023-05-07 RX ADMIN — FLUTICASONE PROPIONATE 1 SPRAY: 50 SPRAY, METERED NASAL at 20:27

## 2023-05-07 RX ADMIN — HYDROXYZINE HYDROCHLORIDE 25 MG: 25 TABLET, FILM COATED ORAL at 22:43

## 2023-05-07 RX ADMIN — Medication 250 MG: at 08:20

## 2023-05-07 RX ADMIN — METFORMIN ER 500 MG 2000 MG: 500 TABLET ORAL at 08:15

## 2023-05-07 ASSESSMENT — ACTIVITIES OF DAILY LIVING (ADL)
HYGIENE/GROOMING: INDEPENDENT
ADLS_ACUITY_SCORE: 18
ADLS_ACUITY_SCORE: 18
LAUNDRY: WITH SUPERVISION
ADLS_ACUITY_SCORE: 18
HYGIENE/GROOMING: INDEPENDENT
ADLS_ACUITY_SCORE: 18
ORAL_HYGIENE: INDEPENDENT
ORAL_HYGIENE: INDEPENDENT
ADLS_ACUITY_SCORE: 18
DRESS: INDEPENDENT
DRESS: INDEPENDENT
ADLS_ACUITY_SCORE: 18
LAUNDRY: WITH SUPERVISION

## 2023-05-07 NOTE — PLAN OF CARE
"Goal Outcome Evaluation:    Plan of Care Reviewed With: patient      Problem: Anxiety Signs/Symptoms  Goal: Optimized Energy Level (Anxiety Signs/Symptoms)  Outcome: Progressing  Intervention: Optimize Energy Level  Recent Flowsheet Documentation  Taken 5/7/2023 1700 by Jill Murrell RN  Activity (Behavioral Health): up ad maida     Patient inappropriately focused on discharge from here. Patient asked, \"what can I do to get discharged from here.\" RN explained that he has to attend group therapies and follow his provider's orders. Patient stated that he do not like going to groups. He denied anxiety and depression. He denied SI/HI and hallucinations. Patient complained of back pain of 10/10. He declined PRN medication for pain. Patient stated \"I don't like taking medications. \"Those medications they raise my blood pressure.\" Patient also declined to apply hot pack on his back.     [facility] and [facility] [employer] game. He was seen socially interactive with peers. Patient was observed at one time pacing the hallway. At a point, he stopped and was repeatedly squatting on the hallway in the form of exercise. He was also joined by a staff. Patient did not appear to be in pain, niether did he complain of pain again this shift. He was compliant with his night medication. Blood sugar check at dinner was 299. Patient finished his dinner quickly. The blood sugar was done after he ate.  Patient later came back for pain medication. PRN Tylenol administered at 2224 for pain of 8/10. He even accepted warm pack. At 2243 patient came back to request sleep medication. PRN Melatonin and Hydroxyzine administered. Will continue to monitor.                     "

## 2023-05-07 NOTE — PROGRESS NOTES
Pt appears to be sleeping 7 hrs. Pt is on status 15 min checks.   Room blocked due to severe paranoia, delusional thoughts

## 2023-05-07 NOTE — PLAN OF CARE
"Patient was up at the beginning of the shift, flowsheets indicate he slept for 7 hours, however he says he still feels tired.  Reported no pain this morning.  At 10:20am he approached the desk requesting ibuprofen for back pain rated 6/10.  Gave Ibuprofen which reduced pain to 4/10.  He described a Chiropractic visit which resulted in further injury and he has had chronic back pain ever since.  He requested Tylenol at 1:15pm for back pain 5/10.  1 hour after administration he reports the pain is still there.  Given egg-crate mattress but he took it off and threw it away because \"it didn't help\".  Gave ice pack which he used for less than 30 seconds before returning it and saying it didn't help.  Patient demanding to leave saying he is in too much pain to stay here any longer.  Following breakfast he went back in his room for about an hour and rested in bed.  Social with staff and peers, spent time in the dining area reading and watching TV in the lounge.  Remains a bit suspicious/paranoid but seems somewhat less so than previous interactions.  He allowed blood glucose check and insulin to be given without objection.  Denied anxiety, depression and all other mental health symptoms. Patient continues to report; \"I just don't wanna be here\".  Blood glucose was 243 - says he had eaten an applesauce and banana .  Patient is med compliant, refused flonase.  Mom visited this afternoon which seemed to go well.  Patient reports feeling safe and agrees to notify staff if he is experiencing any discomforts.  Continues to require no roommate order due to paranoia.  /74 (BP Location: Left arm, Patient Position: Sitting)   Pulse 95   Temp 97.8  F (36.6  C) (Oral)   Resp 18   Wt 88.9 kg (196 lb)   SpO2 96%   BMI 32.62 kg/m      "

## 2023-05-07 NOTE — PLAN OF CARE
Tasks Complete:    Chart review     Team meeting   PT observed resting. CTC will follow up with applying for benefits on Monday   Current Symptoms include the following: See RN note     Addressed patient needs/concerns: See above intervention    Discharge Plan or Goal   Plan  TBD likely discharge to an IRTS     Commitment  Commited and Jarvised 05/02/2023-11/02/2023  18-PD-EA-    Care Team   No outpatient care team   Father - Gokul Mccoy ([phone])  Civil commitment - Fátima Nunez- [phone]     Barriers to Discharge   Ongoing stabilization  Committed and Jarvised      Referral Status  PT would benefit from  such as case management, psychiatry, OP programming. No referrals have been made as of yet.      Legal Status  Court hold

## 2023-05-08 LAB
GLUCOSE BLDC GLUCOMTR-MCNC: 170 MG/DL (ref 70–99)
GLUCOSE BLDC GLUCOMTR-MCNC: 187 MG/DL (ref 70–99)

## 2023-05-08 PROCEDURE — 124N000002 HC R&B MH UMMC

## 2023-05-08 PROCEDURE — H2032 ACTIVITY THERAPY, PER 15 MIN: HCPCS

## 2023-05-08 PROCEDURE — 250N000013 HC RX MED GY IP 250 OP 250 PS 637: Performed by: NURSE PRACTITIONER

## 2023-05-08 PROCEDURE — 99231 SBSQ HOSP IP/OBS SF/LOW 25: CPT

## 2023-05-08 PROCEDURE — 250N000013 HC RX MED GY IP 250 OP 250 PS 637: Performed by: PSYCHIATRY & NEUROLOGY

## 2023-05-08 RX ORDER — GABAPENTIN 300 MG/1
300 CAPSULE ORAL 3 TIMES DAILY PRN
Status: DISCONTINUED | OUTPATIENT
Start: 2023-05-08 | End: 2023-06-06 | Stop reason: HOSPADM

## 2023-05-08 RX ADMIN — ACETAMINOPHEN 325MG 650 MG: 325 TABLET ORAL at 13:25

## 2023-05-08 RX ADMIN — ACETAMINOPHEN 325MG 650 MG: 325 TABLET ORAL at 19:26

## 2023-05-08 RX ADMIN — Medication 250 MG: at 08:04

## 2023-05-08 RX ADMIN — METFORMIN ER 500 MG 2000 MG: 500 TABLET ORAL at 07:59

## 2023-05-08 RX ADMIN — HALOPERIDOL 10 MG: 10 TABLET ORAL at 19:40

## 2023-05-08 RX ADMIN — FLUTICASONE PROPIONATE 1 SPRAY: 50 SPRAY, METERED NASAL at 19:40

## 2023-05-08 RX ADMIN — INSULIN GLARGINE 15 UNITS: 100 INJECTION, SOLUTION SUBCUTANEOUS at 08:00

## 2023-05-08 RX ADMIN — GABAPENTIN 300 MG: 300 CAPSULE ORAL at 12:44

## 2023-05-08 ASSESSMENT — ACTIVITIES OF DAILY LIVING (ADL)
ADLS_ACUITY_SCORE: 18
DRESS: INDEPENDENT
ADLS_ACUITY_SCORE: 18
LAUNDRY: WITH SUPERVISION
HYGIENE/GROOMING: INDEPENDENT
ADLS_ACUITY_SCORE: 18
ORAL_HYGIENE: INDEPENDENT

## 2023-05-08 NOTE — PROGRESS NOTES
05/08/23 1359   Group Therapy Session   Group Attendance attended group session   Time Session Began 1015   Time Session Ended 1200   Total Time (minutes) 30   Total # Attendees 5   Group Type task skill   Group Session Detail Group discussion on the importance of meaningful activities with hands on [employer] for creative expression, FM skills, organization/planning, concentration, coping, frustration tolerance, attention to detail, building self- esteem, mood stabilization, reality-based activity, and an opportunity for socialization.   Patient Participation Detail Pt initially joined group and worked on a suncatcher.  He used only a few colors with attention to only gross detail.  Pt was displeased with the background being all one color as he had ignored the leaves and other items in the background, painting it all one color.  Pt worked quickly and without much thought or care with his project.  Pt went on to work on a 1 step project, but ignored much of the detail in that as well.  Pt shared with therapist that he was feeling tired and needed to lay down.  No charge.

## 2023-05-08 NOTE — PROGRESS NOTES
Brief Medicine Cross Cover Note:    In brief, this is a 38-year-old male with apparently no psychiatric history, admitted station 4A after he was brought in by police to the Emergency Department at [facility] for severe paranoia.  Has paranoid delusions that someone is following him and that his life is in danger.  An Internal Medicine consultation was ordered by Dr. Thorpe to assess medical problems including poorly controlled type 2 diabetes    A/P:     Diabetes mellitus type 2 (hemoglobin A1c 10.9 on 4/28/2023)  See BG trends below. Originally when writer saw patient on date of consult, he asked to be switched to short acting metformin 1000 mg twice daily.  Psychiatry changed him back to metformin XL 2000 mg daily.  Initiated Lantus this admission and started with 10 units and increased to 13 units.  Patient was previously on sliding scale NovoLog but was taken off due to complaints of frequency of blood sugar checks.   Discussed blood sugar trends with patient as below as they continued to be elevated.  Patient states that sometimes he forgets to check his blood sugar and gets it checked after he eats.  We discussed the importance of checking the blood sugars as we do not want to resume the sliding scale which would mean checking his blood sugar 4 times a day  - Lantus to 15 units daily  -POCT blood glucose checks twice daily and as needed  -Hypoglycemia protocol  -Recheck hemoglobin A1c in 3 months  - Medicine will continue to trend BG.       Gianna Mendieta, CNP, APRN  Internal Medicine [facility] Hospitalist  [facility]  [phone]  Securely message with the Vocera Web Console   Text page via [employer]   Text page via [employer]     ----------------------------------------------------------------------------------------------------------------------------------------------------------------------  This chart documentation was completed with Tete  "voice-recognition software. Even though reviewed, this chart may still contain some grammatical, spelling, and word errors.    Subjective: Patient feeling good today.  Stated that his blood sugar was checked after eating it yesterday and that is why his blood sugars are still high.  Patient stating that he would like to avoid additional blood sugar checks as they are painful to his fingers.  Denies any headaches, increase in thirst/hunger/urination, chest pain, and shortness of breath.    Objective:    Vital signs:  Temp: 97.4  F (36.3  C) Temp src: Temporal BP: 114/75 Pulse: 98   Resp: 16 SpO2: 96 % O2 Device: None (Room air)     Weight: 88.9 kg (196 lb)  Estimated body mass index is 32.62 kg/m  as calculated from the following:    Height as of 4/18/23: 1.651 m (5' 5\").    Weight as of this encounter: 88.9 kg (196 lb).    Labs:    Recent Labs   Lab 05/08/23  0754 05/07/23  1733 05/07/23  0824 05/06/23  0827 05/04/23  0809 05/03/23  0807   * 299* 243* 225* 213* 198*     Imaging:  None indicated.    PE:  General Appearance: In NAD, sitting in chair  Skin: Intact on face, arms, legs. No wounds, bruising, or lesions noted.  Other: A&Ox4, moving all extremities      30 MINUTES SPENT BY ME on the date of service doing chart review, history, exam, documentation & further activities per the note    "

## 2023-05-08 NOTE — PROGRESS NOTES
NOC Shift Report    Pt in bed at beginning of shift, breathing quiet and unlabored. Pt slept 7 hours. No pt complaints or concerns at this time.  Will continue to monitor.     Room blocked due to severe paranoia, delusional thoughts, insomnia .

## 2023-05-08 NOTE — PROGRESS NOTES
" 23 1500   Group Therapy Session   Time Session Began 1325   Time Session Ended 1405   Total Time (minutes) 40   Total # Attendees 5   Group Type expressive therapy   Group Topic Covered coping skills/lifestyle management   Group Session Detail Music Autobiography   Patient Response/Contribution cooperative with task   Patient Participation Detail Patients wrote down and listened to songs expressin) where you have been 2) where you are now 3) where you would like to go and shared with the group.       Bennett chose the song \"Waymaker\" by Alisson for a song of where he wants to be.  \"Close to God\", he stated.  He was more quiet and to himself, sitting in the back of the group room by the window, and engaged more observationally for most of group.        "

## 2023-05-08 NOTE — PLAN OF CARE
"Patient was up at the beginning of the shift, flowsheets indicate he slept for 7 hours, however he says he still feels tired.  Did not complain of back pain this morning.  After lunch he approached the desk saying \"I need to leave here, I have too much pain\"  Nothing helps, the egg-crate mattress makes it works and the medications don't do anything\".  He did agree to try the gabapentin but came to the desk 10 minutes after administration demanding tylenol because \"the gabapentin is doing nothing\".  Attempted to explain that it will take more time for the gabapentin to take effect but he was insistent, saying his back pain is 20/10 and he needs to leave.  Tylenol was given as requested but upon reassessment he says it didn't help, nothing helps and he needs to leave due to his back pain.  He walks with a steady gait, appears comfortable lying down, sitting, standing and walking.  No grimacing or other signs of extreme pain noted. Refused heat and/or ice pack.  He allowed blood glucose check and insulin to be given without objection.  Denied anxiety, depression and all other mental health symptoms. Patient continues to report; \"I just don't wanna be here\".  Blood glucose was 170 - says he had eaten an applesauce and banana .  Patient is med compliant, refused flonase.  Patient reports feeling safe and agrees to notify staff if he is experiencing any discomforts.  Continues to require no roommate order due to paranoia.  /75 (BP Location: Left arm, Patient Position: Sitting, Cuff Size: Adult Large)   Pulse 98   Temp 97.4  F (36.3  C) (Temporal)   Resp 16   Wt 88.9 kg (196 lb)   SpO2 96%   BMI 32.62 kg/m      "
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
## 2023-05-08 NOTE — PLAN OF CARE
Tasks Complete:    Chart review     Team meeting     [facility] started cash assistance application. Pt noted that he think he may have worked for his dad but is unsure of the amount. He is unsure how to access his bank account for proofs to send to the [facility]. [facility] asked pt to call his parents and schedule a time to meet tomorrow to complete the application. PT agreed.     Civil commitment CM assigned- [facility] left  for [facility] [phone]. Appointment pending    Current Symptoms include the following: See RN note     Addressed patient needs/concerns: See above intervention    Discharge Plan or Goal   Plan  TBD likely discharge to an IRTS     Commitment  Commited and Jarvised 05/02/2023-11/02/2023  [id number]    Care Team   No outpatient care team   Father - Gokul Mccoy ([phone])  Civil commitment - Fátima Nunez- [phone]     Barriers to Discharge   Ongoing stabilization  Committed and Jarvised      Referral Status  PT would benefit from  such as case management, psychiatry, OP programming. No referrals have been made as of yet.      Legal Status  Court hold

## 2023-05-09 LAB
GLUCOSE BLDC GLUCOMTR-MCNC: 124 MG/DL (ref 70–99)
GLUCOSE BLDC GLUCOMTR-MCNC: 172 MG/DL (ref 70–99)

## 2023-05-09 PROCEDURE — 250N000013 HC RX MED GY IP 250 OP 250 PS 637: Performed by: NURSE PRACTITIONER

## 2023-05-09 PROCEDURE — 250N000013 HC RX MED GY IP 250 OP 250 PS 637: Performed by: PSYCHIATRY & NEUROLOGY

## 2023-05-09 PROCEDURE — 99232 SBSQ HOSP IP/OBS MODERATE 35: CPT | Performed by: NURSE PRACTITIONER

## 2023-05-09 PROCEDURE — 124N000002 HC R&B MH UMMC

## 2023-05-09 PROCEDURE — H2032 ACTIVITY THERAPY, PER 15 MIN: HCPCS

## 2023-05-09 PROCEDURE — G0177 OPPS/PHP; TRAIN & EDUC SERV: HCPCS

## 2023-05-09 RX ADMIN — ACETAMINOPHEN 325MG 650 MG: 325 TABLET ORAL at 22:55

## 2023-05-09 RX ADMIN — FLUTICASONE PROPIONATE 1 SPRAY: 50 SPRAY, METERED NASAL at 21:12

## 2023-05-09 RX ADMIN — FLUTICASONE PROPIONATE 1 SPRAY: 50 SPRAY, METERED NASAL at 09:12

## 2023-05-09 RX ADMIN — IBUPROFEN 600 MG: 600 TABLET ORAL at 14:08

## 2023-05-09 RX ADMIN — HYDROXYZINE HYDROCHLORIDE 25 MG: 25 TABLET, FILM COATED ORAL at 23:46

## 2023-05-09 RX ADMIN — METFORMIN ER 500 MG 2000 MG: 500 TABLET ORAL at 09:12

## 2023-05-09 RX ADMIN — Medication 250 MG: at 09:12

## 2023-05-09 RX ADMIN — INSULIN GLARGINE 15 UNITS: 100 INJECTION, SOLUTION SUBCUTANEOUS at 09:12

## 2023-05-09 RX ADMIN — HALOPERIDOL 10 MG: 10 TABLET ORAL at 21:11

## 2023-05-09 RX ADMIN — GABAPENTIN 300 MG: 300 CAPSULE ORAL at 13:18

## 2023-05-09 RX ADMIN — Medication 3 MG: at 23:46

## 2023-05-09 ASSESSMENT — ACTIVITIES OF DAILY LIVING (ADL)
ADLS_ACUITY_SCORE: 18
ADLS_ACUITY_SCORE: 18
DRESS: INDEPENDENT
ADLS_ACUITY_SCORE: 18
DRESS: INDEPENDENT;SCRUBS (BEHAVIORAL HEALTH)
ADLS_ACUITY_SCORE: 18
ADLS_ACUITY_SCORE: 18
HYGIENE/GROOMING: INDEPENDENT
ORAL_HYGIENE: INDEPENDENT
ADLS_ACUITY_SCORE: 18
LAUNDRY: WITH SUPERVISION
ADLS_ACUITY_SCORE: 18
ADLS_ACUITY_SCORE: 18
ORAL_HYGIENE: INDEPENDENT
ADLS_ACUITY_SCORE: 18
HYGIENE/GROOMING: INDEPENDENT
ADLS_ACUITY_SCORE: 18
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
## 2023-05-09 NOTE — CONSULTS
[facility]    Patient: Alexander Mccoy  : 1985  MRN: [id number]  Date of Admission: 2023  Requesting Provider: Kelsey Thorpe - Reason for consult: loose lower left molar     Assessment and Recommendations:     Assessment:   38 year old male with PMH of type II DM and unspecified psychosis was admitted for likely paranoid schizophrenia. Upon telehealth consultation, patient is suspect to have mobility on a lower left molar.       Recommendations:  1. Recommend care team at the [facility] contact the [facility] dental clinic for scheduling a dental visit with dental exam and imaging. A treatment plan will be created after the exam.    2. Recommend escort and  stay the entirety of the visit.     Clinic information:   [facility]  [address] [place], [place] [address] [place], [place] [address] [phone]    Recommendations discussed with attending Dr. Marc Mcdonough    Thank you for this consult. The [facility] team will continue to follow along. Please feel free to call dental on call team (number on amcom) with any questions.     Kimmy Mccoy DDS   [facility] PGY-1   Pager: [phone]    Past Medical History:     Past Medical History:   Diagnosis Date     Asthma     as a kid        Allergies:   No Known Allergies      Family History:   No family history on file.    Social History:     Social History     Socioeconomic History     Marital status: Single     Spouse name: Not on file     Number of children: Not on file     Years of education: Not on file     Highest education level: Not on file   Occupational History     Not on file   Tobacco Use     Smoking status: Never     Smokeless tobacco: Never   Vaping Use     Vaping status: Not on file   Substance and Sexual Activity     Alcohol use: No     Drug use: No     Sexual activity: Not on file   Other Topics Concern     Parent/sibling w/ CABG, MI or angioplasty before  65F 55M? Not Asked   Social History Narrative     Not on file     Social Determinants of Health     Financial Resource Strain: Not on file   Food Insecurity: Not on file   Transportation Needs: Not on file   Physical Activity: Not on file   Stress: Not on file   Social Connections: Not on file   Intimate Partner Violence: Not on file   Housing Stability: Not on file       Recent Microbiology Data:   No results for input(s): CULT, SDES in the last 168 hours.    Imaging:   No imaging was obtained to review

## 2023-05-09 NOTE — PLAN OF CARE
Occupational Therapy Group    05/09/23 1247   Group Therapy Session   Group Attendance attended group session   Time Session Began 1115   Time Session Ended 1200   Total Time (minutes) 45   Total # Attendees 6   Group Type life skill;psychoeducation   Group Topic Covered coping skills/lifestyle management;relationship;structured socialization   Group Session Detail Journaling and Discussions: group activity to encourage connection, communication, and self-reflection through discussion and journal prompts.   Patient Participation Detail Pt participated in a group activity promoting connection and self-reflection using discussion topic cards. Pt declined writing in a journal, reporting having difficulty with fine motor capabilities with hand. Pt appeared engaged and shared personal experiences to the group, about forgiveness and managing emotions (anger).

## 2023-05-09 NOTE — PLAN OF CARE
"  Problem: Suicidal Behavior  Goal: Suicidal Behavior is Absent or Managed  Outcome: Progressing   Goal Outcome Evaluation:    Pt said his mood is good and that he wants to do everything he can do discharge soon. Denied anxiety, depression, no SI/HA. Reports back pain '3'/10, chronic. Has loose tooth (lower, left) rated pain '2'/10. Pt has a consult to Dentistry in place. Will need an appointment made when scheduling is open.  Pt is aware to ask for cold pack and Ibuprofen for pain management as needed. Was willing to have blood sugar check -124. Pt has complaints that he should not be in the hospital and that the medication is wearing him down. Pt stated,  \"the  put me here\" \"they lied to me\". Present in the milieu played video games. Attended evening group. Took scheduled medication and compliant with mouth check.                            "

## 2023-05-09 NOTE — PLAN OF CARE
Problem: Psychotic Signs/Symptoms  Goal: Improved Behavioral Control (Psychotic Signs/Symptoms)  Outcome: Progressing     Problem: Anxiety Signs/Symptoms  Goal: Optimized Energy Level (Anxiety Signs/Symptoms)  Outcome: Not Progressing  Goal: Improved Mood Symptoms (Anxiety Signs/Symptoms)  Outcome: Progressing     Problem: Anxiety Signs/Symptoms  Goal: Improved Mood Symptoms (Anxiety Signs/Symptoms)  Outcome: Progressing   Goal Outcome Evaluation:    Patient complained of low energy and dizziness at  the beginning  of the shift, Vital signs were taken, BP was 123/82, T 97.3. O2 sat, 94%, Pulse was elevated at 104. Patient was encouraged to drink some fluid. Blood Sugar taken before dinner was 187 mg/dl      He also complained of headache which he rated 5/10,  Tylenol 650 mg was given and it  was helpful, headache was zero when reassessed. He attributed his low energy  to Haldol.    Patient said he does not know why he is here, that he is not psychotic, and not paranoid. He was calm throughout the shift, ate dinner and took all his scheduled medications.

## 2023-05-09 NOTE — PROGRESS NOTES
[facility],  Psychiatric Progress Note      Impression:     Alexander Mcocy is a 38-year-old male admitted to [address] on 4/22/2023.  He was admitted on a court hold from the [facility] ER due to psychosis.  He stated that he went to a police station because he believed undercover governmental agents were tracking him.  He said he no longer has a job because the government agents had infiltrated through his phone and computer.  He became involved in an altercation with the  at the [facility].  UTOX was negative and he denies substance abuse.  He was not taking any psychotropic medications prior to admission.  While in the ER, he was intermittently agitated.  He called 911.  During his time in the ER, a petition for commitment MI with [facility] was filed in [facility], and he was placed on a court hold.  He has type 2 diabetes.  HbA1c was 10.9.  He reports taking Metformin XR prior to admission.  While in the ER, he was inconsistently adherent to insulin and BG checks.  During the admission assessment, he reports rarely checking his blood glucose and states that his BG never exceeds 200, however BGs have often been over 300 since he was in the ER, which he relates to stress.  He says if he were not in the hospital, they would be lower.   He was not taking any psychotropic medications prior to admission.  Abilify was initiated in the ER, but he refused the second dose.  He states he will not take psychotropic medications nor consent to labs or insulin while hospitalized.  Throughout the conversation, he rarely responded directly to provider's inquiries and instead made comments about being tracked and the injustices of being hospitalized.  Since admission, Abilify was increased, and he refused 3/4 doses.  Abilify was then discontinued and replaced with Haldol; he took 2 doses and then refused the rest until Kristofer was  enacted.  PRNs of Melatonin, Hydroxyzine and Zyprexa were initiated.  He initially refused BG checks and insulin but has been adhering since the evening of 4/22 onward.  He has been calmer and less irritable.  He continues to have paranoid delusions and poor insight.  He has been sleeping poorly.  He is committed and Jarvised as of 5/2.  Upon receiving court paperwork, he kicked and punched the exit door and a behavioral code was called.  He subsequently was agreeable to taking medications and going to his room, so he was not placed in seclusion.            Diagnoses:     Unspecified psychosis, likely paranoid schizophrenia  Type 2 diabetes, insulin dependent  Low back pain with left-sided sciatica         Plan:     Medications:  Continue Haldol 10 mg PO at HS with a back up of 5 mg IM per [facility].  Change Melatonin 3 mg from scheduled to PRN.  Continue PRNs of Hydroxyzine and Zyprexa.      He is committed MI with [facility] (Abilify, Risperdal, Haldol, Zyprexa) in [facility].     Internal medicine continuing to follow for diabetes management.  He reports using FreeStyle Candi at home for blood glucose monitoring.     First episode psychosis labs were unremarkable.     He lives with his parents.  Likely plan for IRTS placement.  He does not have outpatient providers. Plan for ENT referral for tinnitus.        Attestation:  Patient has been seen and evaluated by me, Savannah Pope, APRN CNP  The patient was counseled on nature of illness and treatment plan/options  Care was coordinated with treatment team  Total time > 35 minutes           Interim History:     The patient's care was discussed with the treatment team and chart notes were reviewed.  Pt was documented as sleeping 6.5 hours during the overnight shift.  He attended groups yesterday, noted to be rather impulsive.  He took PRN Tylenol x 2 and PRN Neurontin x 1 for low back pain with left-sided sciatica.  He reports they were both somewhat beneficial.  " Pt continues to report drowsiness since Haldol was initiated.  BGs yesterday were 170 and 187.  Pt reports his mood is \"good.\"  He reports a little difficulty falling asleep last night.  Discussed his goals for the future.  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  States he would like to return to college for his bachelor's.  He also mentioned that he should qualify for disability benefits because \"the doctor says I have psychosis.\"  He later stated that he was \"misdiagnosed.\"  With regard to monitoring and \"harassment\" by the government, he said, \"That doesn't concern me.  I don't think about that anymore.\"           Medications:     Current Facility-Administered Medications   Medication     acetaminophen (TYLENOL) tablet 650 mg     alum & mag hydroxide-simethicone (MAALOX) suspension 30 mL     benztropine (COGENTIN) tablet 1 mg     glucose gel 15-30 g    Or     dextrose 50 % injection 25-50 mL    Or     glucagon injection 1 mg     fluticasone (FLONASE) 50 MCG/ACT spray 1 spray     gabapentin (NEURONTIN) capsule 300 mg     haloperidol (HALDOL) tablet 10 mg    Or     haloperidol lactate (HALDOL) injection 5 mg     hydrOXYzine (ATARAX) tablet 25 mg     ibuprofen (ADVIL/MOTRIN) tablet 600 mg     insulin glargine (LANTUS PEN) injection 15 Units     melatonin tablet 3 mg     menthol (Topical Analgesic) 2.5% (BENGAY VANISHING SCENT) 2.5 % topical gel     metFORMIN (GLUCOPHAGE XR) 24 hr tablet 2,000 mg     OLANZapine (zyPREXA) tablet 10 mg    Or     OLANZapine (zyPREXA) injection 10 mg     senna-docusate (SENOKOT-S/PERICOLACE) 8.6-50 MG per tablet 1 tablet     sodium chloride (OCEAN) 0.65 % nasal spray 1 spray     vitamin C (ASCORBIC ACID) tablet 250 mg             Allergies:   No Known Allergies         Psychiatric Examination:     /86 (BP Location: Left arm, Patient Position: " Sitting)   Pulse 92   Temp 97.5  F (36.4  C) (Oral)   Resp 15   Wt 89.1 kg (196 lb 6.4 oz)   SpO2 98%   BMI 32.68 kg/m      Appearance:  awake, alert, fair grooming, dressed in scrubs  Attitude:  cooperative  Eye Contact:  fair  Mood:  frustrated related to being hospitalized, denies feeling sad, anxious or irritable  Affect:  increased intensity  Speech:  clear, coherent, normal volume   Psychomotor Behavior:  no evidence of tardive dyskinesia, dystonia, or tics  Thought Process:  illogical, somewhat disorganized   Associations:  no loose associations  Thought Content:  denies suicidal and homicidal ideation, paranoid delusions are present but less pervasive compared to admission, denies hallucinations  Insight:  poor  Judgment:  limited  Oriented to:  date, time, person, and place  Attention Span and Concentration:  fair  Recent and Remote Memory:  fair  Language:  intact, fluent English  Fund of Knowledge:  appropriate  Muscle Strength and Tone:  normal  Gait and Station:  normal          Labs:     Recent Results (from the past 24 hour(s))   Glucose by meter    Collection Time: 05/08/23  4:51 PM   Result Value Ref Range    GLUCOSE BY METER POCT 187 (H) 70 - 99 mg/dL   Glucose by meter    Collection Time: 05/09/23  8:45 AM   Result Value Ref Range    GLUCOSE BY METER POCT 172 (H) 70 - 99 mg/dL

## 2023-05-09 NOTE — PLAN OF CARE
"Goal Outcome Evaluation:     Plan of Care Reviewed With: patient     Patient alert and oriented to person, place, and time, adequately groomed. Pt is pleasant, blunted affect, good eye contact, cooperative, medication compliant, denied all psych symptoms or pain. No PRN's given. BS (BID) this morning 172, pt on oral metformin and long-acting Lantus. Pt diet 60 gram carb per meal. Pt on suicidal, SIB, assault, elopement, falls precautions, no behaviors observed. Pt contracts for safety. Pt reports being \"tired,\" slept until breakfast arrived, ate well and returned to his room, slept until 1115, attended group, appropriate with peers and staff. Pt demonstrates ability to communicate needs to staff. No behaviors noted. Pt reported pain 6/10 after lunch, education provided regarding tylenol vs ibuprofen vs gabapentin. Pt stated \"the gabapentin made me really tired yesterday but I'll try it again.\" Reassessment of pain 5/10, pt requested ibuprofen which was effective. Pt also reported a loose tooth - molar on lower left - provider ordered consult. Will continue to monitor behavior and encourage engagement.     NURSING ASSESSMENT  Pain: denied AM - 1300 reported pain of 6/10 lower back medial - prn given  Anxiety: denied  Depression: denied  SI: denied  SIB: denied  HI: denied  AVH: denied, did not appear to be responding to internal stimuli, did not demonstrate delusional thinking.  Mood/Affect: blunted   Sleep: pt states \"  Medication: compliant, no side effects reported or observed  PRN: gabapentin for back pain 6/10 after lunch  Appetite: breakfast 100%     Lunch 100%  ADLs: independent   Visits: none  Vitals: WNL   Medical:  chronic back pain, prn gabapentin given, loose tooth - consult ordered.    Problem: Psychotic Signs/Symptoms  Goal: Improved Behavioral Control (Psychotic Signs/Symptoms)  5/9/2023 0837 by Willa Benton, RN  Outcome: Progressing  5/9/2023 0833 by Willa Benton, RN  Outcome: " Progressing     Problem: Psychotic Signs/Symptoms  Goal: Increased Participation and Engagement (Psychotic Signs/Symptoms)  5/9/2023 0833 by Willa Benton RN  Outcome: Progressing     Problem: Psychotic Signs/Symptoms  Goal: Improved Mood Symptoms  5/9/2023 0833 by Willa Benton RN  Outcome: Progressing     Problem: Psychotic Signs/Symptoms  Goal: Improved Sleep (Psychotic Signs/Symptoms)  5/9/2023 0833 by Willa Benton RN  Outcome: Progressing     Problem: Psychotic Signs/Symptoms  Goal: Enhanced Social, Occupational or Functional Skills (Psychotic Signs/Symptoms)  5/9/2023 0833 by Willa Benton RN  Outcome: Progressing     Problem: Sleep Disturbance  Goal: Adequate Sleep/Rest  5/9/2023 0833 by Willa Benton RN  Outcome: Progressing     Problem: Suicidal Behavior  Goal: Suicidal Behavior is Absent or Managed  5/9/2023 0833 by Willa Benton RN  Outcome: Progressing

## 2023-05-10 LAB
GLUCOSE BLDC GLUCOMTR-MCNC: 151 MG/DL (ref 70–99)
GLUCOSE BLDC GLUCOMTR-MCNC: 190 MG/DL (ref 70–99)

## 2023-05-10 PROCEDURE — 99232 SBSQ HOSP IP/OBS MODERATE 35: CPT | Performed by: NURSE PRACTITIONER

## 2023-05-10 PROCEDURE — 250N000013 HC RX MED GY IP 250 OP 250 PS 637: Performed by: NURSE PRACTITIONER

## 2023-05-10 PROCEDURE — 124N000002 HC R&B MH UMMC

## 2023-05-10 PROCEDURE — 250N000013 HC RX MED GY IP 250 OP 250 PS 637: Performed by: PSYCHIATRY & NEUROLOGY

## 2023-05-10 RX ADMIN — ACETAMINOPHEN 325MG 650 MG: 325 TABLET ORAL at 14:42

## 2023-05-10 RX ADMIN — FLUTICASONE PROPIONATE 1 SPRAY: 50 SPRAY, METERED NASAL at 19:55

## 2023-05-10 RX ADMIN — METFORMIN ER 500 MG 2000 MG: 500 TABLET ORAL at 08:40

## 2023-05-10 RX ADMIN — Medication 250 MG: at 08:41

## 2023-05-10 RX ADMIN — FLUTICASONE PROPIONATE 1 SPRAY: 50 SPRAY, METERED NASAL at 08:59

## 2023-05-10 RX ADMIN — INSULIN GLARGINE 15 UNITS: 100 INJECTION, SOLUTION SUBCUTANEOUS at 08:41

## 2023-05-10 RX ADMIN — Medication 3 MG: at 22:10

## 2023-05-10 RX ADMIN — IBUPROFEN 600 MG: 600 TABLET ORAL at 09:30

## 2023-05-10 RX ADMIN — HALOPERIDOL 10 MG: 10 TABLET ORAL at 19:55

## 2023-05-10 ASSESSMENT — ACTIVITIES OF DAILY LIVING (ADL)
ADLS_ACUITY_SCORE: 18
DRESS: INDEPENDENT
ADLS_ACUITY_SCORE: 18
HYGIENE/GROOMING: INDEPENDENT
ADLS_ACUITY_SCORE: 18

## 2023-05-10 NOTE — PLAN OF CARE
OT PROGRESS NOTE:     05/09/23 2000   Group Therapy Session   Group Attendance attended group session   Time Session Began 1315   Time Session Ended 1400   Total Time (minutes) 45   Total # Attendees 7   Group Type recreation;task skill   Group Topic Covered balanced lifestyle;coping skills/lifestyle management;leisure exploration/use of leisure time;structured socialization   Group Session Detail Apples to Apples game to promote focus, concentration. social interaction, increased awareness of others, balanced life style and positive use of time to distract and find new activities for structure.   Patient Response/Contribution able to recall/repeat info presented;cooperative with task;discussed personal experience with topic;expressed understanding of topic;organized   Patient Participation Detail Initiated group and actively participated Full range of affect with laughing and making jokes with peers. Attentive and organized entire session. Spontaneous in responses. Cards selections appeared to speak to match peers personalities accurately. Identified video games as his favority social activity. Thoughts organized and on task entire session.

## 2023-05-10 NOTE — PLAN OF CARE
Appointment   May 12th at 11AM    Tasks Complete:    Chart review     Team meeting     CTC complete benefits application through [facility]. Patient will need a letter from his parents regarding his employment to submit the application    CM plans to call patient today to schedule an in person meeting - scheduled for 11AM this Friday May 12th- Americo Cheatham   [facility] ([facility]) Cell: [phone]  Office: [phone]  Fax: [phone]       Current Symptoms include the following: See RN note     Addressed patient needs/concerns: See above intervention    Discharge Plan or Goal   Plan  TBD likely discharge to an IRTS     Commitment  Commited and Jarvised 05/02/2023-11/02/2023  [id number]    Care Team   No outpatient care team   Father - Gokul Mccoy ([phone])  Civil commitment - Fátima Nunez- [phone]     Barriers to Discharge   Ongoing stabilization  Committed and Jarvised      Referral Status  PT would benefit from  such as case management, psychiatry, OP programming. No referrals have been made as of yet.      Legal Status  Court hold

## 2023-05-10 NOTE — PROGRESS NOTES
05/09/23 2100   Group Therapy Session   Group Attendance attended group session   Time Session Began 2000   Time Session Ended 2050   Total Time (minutes) 50   Total # Attendees 6   Group Type recreation   Group Topic Covered relaxation techniques   Group Session Detail stress reduction   Patient Response/Contribution cooperative with task   Patient Participation Detail Pt actively participated in a structured Therapeutic Recreation group with a focus on leisure participation, socializing, and exercise. Pt participated in the guided exercise for the full duration of the group. Pt followed along, engaged in the guided chair exercise routine and added to the discussion prompts throughout the routine.  Pt was encouraged to use positive imagery with the deep breathing and stretching to foster relaxation, improves focus, and reduce stress.

## 2023-05-10 NOTE — PROGRESS NOTES
"Pt escalated into an agitated state this afternoon. Pounded nurses station desk and demanded to leave. Pt yelling and swearing stating \"I have to go home right now\" \"You are keeping me against my will\" pt unwilling to take prn medications. Nurse spent time verbally deescalating patient ,  Pt responsive and was able to calm. Will continue to monitor closely.  "

## 2023-05-10 NOTE — PLAN OF CARE
Pt was awake at the start of the night shift and appeared to be walking in the hallway.  He approached the desk at 11:45 pm to request PRN medications for insomnia.  Pt reported that he had already taken his hs medications including Haldol which usually makes him feel 'tired' all day but he isn't feeling tired yet tonight.  Pt asked for PRN Trazodone and seemed disappointed that is was no longer ordered.  He declined offering of Zyprexa but took PRN melatonin and hydroxyzine then went to his room.    Pt appears to be sleeping uninterrupted since 00:45 and remains in bed snoring at 6 am.  Approximately 5 hours of sleep is recorded tonight.

## 2023-05-10 NOTE — PLAN OF CARE
"  Problem: Psychotic Signs/Symptoms  Goal: Improved Mood Symptoms  Outcome: Progressing   Goal Outcome Evaluation:    Plan of Care Reviewed With: patient        Pt up and visible on unit, but also spending time in his room due to chronic back pain. Pt given prn Ibuprofen and was able to rest after medication. Soft care mattress applied to bed for comfort.    Pt has poor insight, denies that there is any mental illness or MH concerns. Pt denies depression, anxiety or SI. Denies hallucinations. Pre occupied with wanting to go home.    Blood sugar 151 this am and pt compliant with BG checks and medications. Ate well for breakfast but did not eat much lunch as he was unhappy with meal. Snack offered.    Pt also has dental pain to lower left. Dental consult noted and dental appointment made. Pt reporting this afternoon that he \"has trouble breathing from the Haldol\" no dyspnea noted. Resp rate is 16 and regular. RA sats 96%. Pt has multiple complaints related to Haldol. Pressured speech and anxiety at times that responds well to re direction.               "

## 2023-05-10 NOTE — PROGRESS NOTES
Brief Medicine Note:    In brief, this is a 38-year-old male with apparently no psychiatric history, admitted station 4A after he was brought in by police to the Emergency Department at [facility] for severe paranoia.  Has paranoid delusions that someone is following him and that his life is in danger.  An Internal Medicine consultation was ordered by Dr. Thorpe to assess medical problems including poorly controlled type 2 diabetes.    Pt was not seen, but relevant vital signs, lab work, and notes reviewed as well.     A/P:     Diabetes mellitus type 2 (hemoglobin A1c 10.9 on 4/28/2023)  See BG trends below. Originally when writer saw patient on date of consult, he asked to be switched to short acting metformin 1000 mg twice daily.  Psychiatry changed him back to metformin XL 2000 mg daily.  Initiated Lantus this admission and started with 10 units and increased to 13 units.  Patient was previously on sliding scale NovoLog but was taken off due to complaints of frequency of blood sugar checks.   Discussed blood sugar trends with patient as below as they continued to be elevated.  Patient states that sometimes he forgets to check his blood sugar and gets it checked after he eats.  We discussed the importance of checking the blood sugars as we do not want to resume the sliding scale which would mean checking his blood sugar 4 times a day.  - Metformin 24 hr 2,000 mg daily  - Lantus to 15 units daily  - POCT blood glucose checks twice daily and as needed  -Hypoglycemia protocol  -Recheck hemoglobin A1c in 3 months  - Medicine will sign off  - Discharge recommendations:   - Metformin 24 hr 2,000 mg daily   - Lantus to 15 units daily   - Ensure he has a glucometer and supplies and practices getting BG prior to leaving   - Establish primary care visit to discuss T2DM with PCP prior to leaving       Gianna Mendieta, CNP, APRN  Internal Medicine [facility] Hospitalist  [facility]  "[facility]  [phone]  Securely message with the Vocera Web Console   Text page via [employer] Paging/Directory   Text page via [employer]         Vital signs:  Temp: 98.8  F (37.1  C) Temp src: Oral BP: 113/80 Pulse: 90   Resp: 16 SpO2: 96 % O2 Device: None (Room air)     Weight: 89.1 kg (196 lb 6.4 oz)  Estimated body mass index is 32.68 kg/m  as calculated from the following:    Height as of 4/18/23: 1.651 m (5' 5\").    Weight as of this encounter: 89.1 kg (196 lb 6.4 oz).    Labs:    Recent Labs   Lab 05/09/23  1654 05/09/23  0845 05/08/23  1651 05/08/23  0754 05/07/23  1733 05/07/23  0824   * 172* 187* 170* 299* 243*           "

## 2023-05-10 NOTE — PROGRESS NOTES
[facility],  Psychiatric Progress Note      Impression:     Alexander Mccoy is a 38-year-old male admitted to [address] on 4/22/2023.  He was admitted on a court hold from the [facility] ER due to psychosis.  He stated that he went to a police station because he believed undercover governmental agents were tracking him.  He said he no longer has a job because the government agents had infiltrated through his phone and computer.  He became involved in an altercation with the  at the [facility].  UTOX was negative and he denies substance abuse.  He was not taking any psychotropic medications prior to admission.  While in the ER, he was intermittently agitated.  He called 911.  During his time in the ER, a petition for commitment MI with [facility] was filed in [facility], and he was placed on a court hold.  He has type 2 diabetes.  HbA1c was 10.9.  He reports taking Metformin XR prior to admission.  While in the ER, he was inconsistently adherent to insulin and BG checks.  During the admission assessment, he reports rarely checking his blood glucose and states that his BG never exceeds 200, however BGs have often been over 300 since he was in the ER, which he relates to stress.  He says if he were not in the hospital, they would be lower.   He was not taking any psychotropic medications prior to admission.  Abilify was initiated in the ER, but he refused the second dose.  He states he will not take psychotropic medications nor consent to labs or insulin while hospitalized.  Throughout the conversation, he rarely responded directly to provider's inquiries and instead made comments about being tracked and the injustices of being hospitalized.  Since admission, Abilify was increased, and he refused 3/4 doses.  Abilify was then discontinued and replaced with Haldol; he took 2 doses and then refused the rest until [facility] was  enacted.  PRNs of Melatonin, Hydroxyzine and Zyprexa were initiated.  He initially refused BG checks and insulin but has been adhering since the evening of 4/22 onward.   He is committed and Jarvised as of 5/2.  Upon receiving court paperwork, he kicked and punched the exit door and a behavioral code was called.  He subsequently was agreeable to taking medications and going to his room, so he was not placed in seclusion.  He has been calmer and less irritable.  He continues to have paranoid delusions and poor insight.  Sleep is improved.           Diagnoses:     Unspecified psychosis, likely paranoid schizophrenia  Type 2 diabetes, insulin dependent  Low back pain with left-sided sciatica  Mobility on left lower molar         Plan:     Medications:  Continue Haldol 10 mg PO at HS with a back up of 5 mg IM per [facility].  Continue PRNs of Melatonin, Hydroxyzine and Zyprexa.      He is committed MI with [facility] (Abilify, Risperdal, Haldol, Zyprexa) in [facility].     Internal medicine signed off on diabetes management on 5/10.  He reports using FreeStyle Candi at home for blood glucose monitoring.     First episode psychosis labs were unremarkable.     He has a dental appointment 5/16 at 0900 in [facility] due to mobility on left lower molar.    He lives with his parents.  Likely plan for IRTS placement.  He does not have outpatient providers. Plan for ENT referral for tinnitus.        Attestation:  Patient has been seen and evaluated by me, Savannah Pope, CLARA CNP  The patient was counseled on nature of illness and treatment plan/options  Care was coordinated with treatment team  Total time > 35 minutes           Interim History:     The patient's care was discussed with the treatment team and chart notes were reviewed.  Pt was documented as sleeping 5 hours during the overnight shift.  Yesterday he attended groups and played games on the [employer]  Staff report that he continues to make  "paranoid/delusional statements, but no disruptive behaviors were noted, and he has generally been polite during conversations with staff.  He took PRNs of Ibuprofen, Tylenol, Neurontin, Hydroxyzine and Melatonin.  Pt reports that his left lower wisdom tooth fell out shortly after he was admitted.  The adjacent molar is mobile with pain of 2-3/10.  Dental consult recommends exam in clinic on 5/16 at 0900.  Internal medicine signed off on management of diabetes.  Pt states his mood is \"good.\"  He does endorse some irritability \"because I'm here.\"  Pt states that he becomes upset when staff ask him whether he has hallucinations.  \"I've never had a hallucination in my life.\"  Pt also stated that he does not believe he is paranoid or delusional.  Provider inquired as to whether he believes it is possible that he misinterpreted information regarding his surroundings.  He initially stated this was possible, then later stated he is certain he was being harassed by \"undercovers.\"  Pt said, \"They manipulate people around me.\"  Pt stated that this began in 2019, around the time he was using meth, when he was held at [facility].           Medications:     Current Facility-Administered Medications   Medication     acetaminophen (TYLENOL) tablet 650 mg     alum & mag hydroxide-simethicone (MAALOX) suspension 30 mL     benztropine (COGENTIN) tablet 1 mg     glucose gel 15-30 g    Or     dextrose 50 % injection 25-50 mL    Or     glucagon injection 1 mg     fluticasone (FLONASE) 50 MCG/ACT spray 1 spray     gabapentin (NEURONTIN) capsule 300 mg     haloperidol (HALDOL) tablet 10 mg    Or     haloperidol lactate (HALDOL) injection 5 mg     hydrOXYzine (ATARAX) tablet 25 mg     ibuprofen (ADVIL/MOTRIN) tablet 600 mg     insulin glargine (LANTUS PEN) injection 15 Units     melatonin tablet 3 mg     menthol (Topical Analgesic) 2.5% (BENGAY VANISHING SCENT) 2.5 % topical gel     metFORMIN (GLUCOPHAGE XR) 24 hr tablet 2,000 mg     " "OLANZapine (zyPREXA) tablet 10 mg    Or     OLANZapine (zyPREXA) injection 10 mg     senna-docusate (SENOKOT-S/PERICOLACE) 8.6-50 MG per tablet 1 tablet     sodium chloride (OCEAN) 0.65 % nasal spray 1 spray     vitamin C (ASCORBIC ACID) tablet 250 mg             Allergies:   No Known Allergies         Psychiatric Examination:     /80 (BP Location: Left arm)   Pulse 90   Temp 98  F (36.7  C) (Oral)   Resp 16   Wt 89.1 kg (196 lb 6.4 oz)   SpO2 97%   BMI 32.68 kg/m      Appearance:  awake, alert, fair grooming, dressed in scrubs  Attitude:  cooperative  Eye Contact:  fair  Mood:  \"good\" overall with some irritability  Affect:  mildly increased intensity  Speech:  clear, coherent, normal volume   Psychomotor Behavior:  no evidence of tardive dyskinesia, dystonia, or tics  Thought Process:  illogical, somewhat disorganized   Associations:  no loose associations  Thought Content:  denies suicidal and homicidal ideation, paranoid delusions are present but less pervasive compared to admission, denies hallucinations  Insight:  poor  Judgment:  limited  Oriented to:  date, time, person, and place  Attention Span and Concentration:  fair  Recent and Remote Memory:  fair  Language:  intact, fluent English  Fund of Knowledge:  appropriate  Muscle Strength and Tone:  normal  Gait and Station:  normal          Labs:     Recent Results (from the past 24 hour(s))   Glucose by meter    Collection Time: 05/09/23  4:54 PM   Result Value Ref Range    GLUCOSE BY METER POCT 124 (H) 70 - 99 mg/dL   Glucose by meter    Collection Time: 05/10/23  8:34 AM   Result Value Ref Range    GLUCOSE BY METER POCT 151 (H) 70 - 99 mg/dL     "

## 2023-05-11 LAB
GLUCOSE BLDC GLUCOMTR-MCNC: 159 MG/DL (ref 70–99)
GLUCOSE BLDC GLUCOMTR-MCNC: 159 MG/DL (ref 70–99)

## 2023-05-11 PROCEDURE — 124N000002 HC R&B MH UMMC

## 2023-05-11 PROCEDURE — 99232 SBSQ HOSP IP/OBS MODERATE 35: CPT | Performed by: NURSE PRACTITIONER

## 2023-05-11 PROCEDURE — 250N000013 HC RX MED GY IP 250 OP 250 PS 637: Performed by: NURSE PRACTITIONER

## 2023-05-11 PROCEDURE — 250N000013 HC RX MED GY IP 250 OP 250 PS 637: Performed by: PSYCHIATRY & NEUROLOGY

## 2023-05-11 RX ORDER — RISPERIDONE 1 MG/1
1 TABLET ORAL EVERY EVENING
Status: DISCONTINUED | OUTPATIENT
Start: 2023-05-11 | End: 2023-05-12

## 2023-05-11 RX ORDER — HALOPERIDOL 5 MG/ML
5 INJECTION INTRAMUSCULAR EVERY EVENING
Status: DISCONTINUED | OUTPATIENT
Start: 2023-05-11 | End: 2023-05-12

## 2023-05-11 RX ADMIN — FLUTICASONE PROPIONATE 1 SPRAY: 50 SPRAY, METERED NASAL at 08:41

## 2023-05-11 RX ADMIN — Medication 3 MG: at 23:18

## 2023-05-11 RX ADMIN — INSULIN GLARGINE 15 UNITS: 100 INJECTION, SOLUTION SUBCUTANEOUS at 08:39

## 2023-05-11 RX ADMIN — ACETAMINOPHEN 325MG 650 MG: 325 TABLET ORAL at 19:55

## 2023-05-11 RX ADMIN — FLUTICASONE PROPIONATE 1 SPRAY: 50 SPRAY, METERED NASAL at 19:55

## 2023-05-11 RX ADMIN — RISPERIDONE 1 MG: 1 TABLET ORAL at 19:55

## 2023-05-11 RX ADMIN — GABAPENTIN 300 MG: 300 CAPSULE ORAL at 22:44

## 2023-05-11 RX ADMIN — METFORMIN ER 500 MG 2000 MG: 500 TABLET ORAL at 08:41

## 2023-05-11 RX ADMIN — Medication 250 MG: at 08:41

## 2023-05-11 RX ADMIN — IBUPROFEN 600 MG: 600 TABLET ORAL at 13:39

## 2023-05-11 RX ADMIN — SALINE NASAL SPRAY 1 SPRAY: 1.5 SOLUTION NASAL at 21:52

## 2023-05-11 ASSESSMENT — ACTIVITIES OF DAILY LIVING (ADL)
ADLS_ACUITY_SCORE: 18
ADLS_ACUITY_SCORE: 18
LAUNDRY: WITH SUPERVISION
ADLS_ACUITY_SCORE: 18
ADLS_ACUITY_SCORE: 18
LAUNDRY: WITH SUPERVISION
HYGIENE/GROOMING: INDEPENDENT
ADLS_ACUITY_SCORE: 18
DRESS: INDEPENDENT;SCRUBS (BEHAVIORAL HEALTH)
DRESS: INDEPENDENT
ORAL_HYGIENE: INDEPENDENT
ADLS_ACUITY_SCORE: 18
ORAL_HYGIENE: INDEPENDENT
ADLS_ACUITY_SCORE: 18
HYGIENE/GROOMING: INDEPENDENT
ADLS_ACUITY_SCORE: 18

## 2023-05-11 NOTE — PROGRESS NOTES
NOC Shift Report    Pt in bed at beginning of shift, breathing quiet and unlabored. Pt slept 7 hours. No pt complaints or concerns at this time.

## 2023-05-11 NOTE — PROGRESS NOTES
[facility],  Psychiatric Progress Note      Impression:     Alexander Mccoy is a 38-year-old male admitted to [address] on 4/22/2023.  He was admitted on a court hold from the [facility] ER due to psychosis.  He stated that he went to a police station because he believed undercover governmental agents were tracking him.  He said he no longer has a job because the government agents had infiltrated through his phone and computer.  He became involved in an altercation with the  at the [facility].  UTOX was negative and he denies substance abuse.  He was not taking any psychotropic medications prior to admission.  While in the ER, he was intermittently agitated.  He called 911.  During his time in the ER, a petition for commitment MI with [facility] was filed in [facility], and he was placed on a court hold.  He has type 2 diabetes.  HbA1c was 10.9.  He reports taking Metformin XR prior to admission.  While in the ER, he was inconsistently adherent to insulin and BG checks.  During the admission assessment, he reports rarely checking his blood glucose and states that his BG never exceeds 200, however BGs have often been over 300 since he was in the ER, which he relates to stress.  He says if he were not in the hospital, they would be lower.   He was not taking any psychotropic medications prior to admission.  Abilify was initiated in the ER, but he refused the second dose.  He states he will not take psychotropic medications nor consent to labs or insulin while hospitalized.  Throughout the conversation, he rarely responded directly to provider's inquiries and instead made comments about being tracked and the injustices of being hospitalized.  Since admission, Abilify was increased, and he refused 3/4 doses.  Abilify was then discontinued and replaced with Haldol; he took 2 doses and then refused the rest until Kristofer was  enacted.  Due to his complaints of side effects including drowsiness, Haldol was discontinued and replaced with Risperdal on 5/11.  PRNs of Melatonin, Hydroxyzine and Zyprexa were initiated.  He initially refused BG checks and insulin but has been adhering since the evening of 4/22 onward.   He is committed and Jarvised as of 5/2.  Upon receiving court paperwork, he kicked and punched the exit door and a behavioral code was called.  He subsequently was agreeable to taking medications and going to his room, so he was not placed in seclusion.  On 5/10 he pounded his fist on the desk, stating he wanted to discharge.  He continues to have paranoid delusions and poor insight.  Sleep is improved.           Diagnoses:     Unspecified psychosis, likely paranoid schizophrenia  Type 2 diabetes, insulin dependent  Low back pain with left-sided sciatica  Mobility on left lower molar         Plan:     Medications:  Discontinue Haldol.  Begin Risperdal 1 mg at HS with a back up of IM Haldol 5 mg per [facility].  Continue PRNs of Melatonin, Hydroxyzine and Zyprexa.      He is committed MI with [facility] (Abilify, Risperdal, Haldol, Zyprexa) in [facility].     Internal medicine signed off on diabetes management on 5/10.  He reports using FreeStyle Candi at home for blood glucose monitoring.     First episode psychosis labs were unremarkable.     He has a dental appointment 5/16 at 0900 in [facility] due to mobility on left lower molar.    He lives with his parents.  Likely plan for IRTS placement.  He does not have outpatient providers. Plan for ENT referral for tinnitus.  His  will meet with him 5/12 at 1100.        Attestation:  Patient has been seen and evaluated by me, Savannah Pope, APRN CNP  The patient was counseled on nature of illness and treatment plan/options  Care was coordinated with treatment team  Total time > 35 minutes           Interim History:     The patient's care was discussed  "with the treatment team and chart notes were reviewed.  Pt was documented as sleeping 7 hours during the over night shift.  Staff report that pt was more irritable yesterday, yelling, pounding his fist on the desk and demanding discharge.  Staff also report he has made frequent complaints of the temperature in his room being too hot or too cold and was not  satisfied with any thermostat setting.  Staff also report he was quite resistant to taking PO Haldol last evening but eventually followed through. Pt states Haldol causes drowsiness, numbness and headaches.  Pt states he understands he must take an antipsychotic medication per court order, but he doesn't want to take Haldol.  Also states he doesn't want to take Abilify because he claims to have done research on this medication prior to admission and believes that \"it killed a lot of people.\"  Provider informed him that the other two [facility]er options, Risperdal and Zyprexa, could exacerbate diabetes.  Nevertheless, pt stated that he would prefer to take a different medication, so Risperdal was ordered.  Pt believes that the government was harassing him \"because I exposed an \" and that they continued to pursue him for years afterwards.  He said, \"I'm tired of thinking about it.  I can't get the answers so now I'm going to ignore it.\"           Medications:     Current Facility-Administered Medications   Medication     acetaminophen (TYLENOL) tablet 650 mg     alum & mag hydroxide-simethicone (MAALOX) suspension 30 mL     benztropine (COGENTIN) tablet 1 mg     glucose gel 15-30 g    Or     dextrose 50 % injection 25-50 mL    Or     glucagon injection 1 mg     fluticasone (FLONASE) 50 MCG/ACT spray 1 spray     gabapentin (NEURONTIN) capsule 300 mg     risperiDONE (risperDAL) tablet 1 mg    Or     haloperidol lactate (HALDOL) injection 5 mg     hydrOXYzine (ATARAX) tablet 25 mg     ibuprofen (ADVIL/MOTRIN) tablet 600 mg     insulin glargine (LANTUS PEN) " injection 15 Units     melatonin tablet 3 mg     menthol (Topical Analgesic) 2.5% (BENGAY VANISHING SCENT) 2.5 % topical gel     metFORMIN (GLUCOPHAGE XR) 24 hr tablet 2,000 mg     OLANZapine (zyPREXA) tablet 10 mg    Or     OLANZapine (zyPREXA) injection 10 mg     senna-docusate (SENOKOT-S/PERICOLACE) 8.6-50 MG per tablet 1 tablet     sodium chloride (OCEAN) 0.65 % nasal spray 1 spray     vitamin C (ASCORBIC ACID) tablet 250 mg             Allergies:   No Known Allergies         Psychiatric Examination:     /79 (BP Location: Left arm)   Pulse 104   Temp 96.9  F (36.1  C) (Temporal)   Resp 16   Wt 89.1 kg (196 lb 6.4 oz)   SpO2 96%   BMI 32.68 kg/m      Appearance:  awake, alert, disheveled, dressed in scrubs  Attitude:  cooperative  Eye Contact:  fair  Mood:  frustrated  Affect:  mildly increased intensity  Speech:  clear, coherent, normal volume   Psychomotor Behavior:  no evidence of tardive dyskinesia, dystonia, or tics  Thought Process:  illogical, somewhat disorganized   Associations:  no loose associations  Thought Content:  denies suicidal and homicidal ideation, paranoid delusions are present but less pervasive compared to admission, denies hallucinations  Insight:  poor  Judgment:  limited  Oriented to:  date, time, person, and place  Attention Span and Concentration:  fair  Recent and Remote Memory:  fair  Language:  intact, fluent English  Fund of Knowledge:  appropriate  Muscle Strength and Tone:  normal  Gait and Station:  normal          Labs:     Recent Results (from the past 24 hour(s))   Glucose by meter    Collection Time: 05/10/23  5:01 PM   Result Value Ref Range    GLUCOSE BY METER POCT 190 (H) 70 - 99 mg/dL   Glucose by meter    Collection Time: 05/11/23  8:22 AM   Result Value Ref Range    GLUCOSE BY METER POCT 159 (H) 70 - 99 mg/dL

## 2023-05-11 NOTE — PLAN OF CARE
Appointment   May 12th at 11AM    Tasks Complete:    Chart review     Team meeting     CTC spoke with pt about paperwork requirements for benefits application. PT mom will bring income letter tonight. CTC will submit paperwork tomorrow morning       Current Symptoms include the following: See RN note     Addressed patient needs/concerns: See above intervention    Discharge Plan or Goal   Plan  TBD likely discharge to an IRTS     Commitment  Commited and Jarvised 05/02/2023-11/02/2023  [id number]    Care Team   No outpatient care team   Father - Gokul Mccoy ([phone])  Civil commitment - Fátima Nunez- [phone]     Barriers to Discharge   Ongoing stabilization  Committed and Jarvised      Referral Status  PT would benefit from  such as case management, psychiatry, OP programming. No referrals have been made as of yet.      Legal Status  Court hold

## 2023-05-11 NOTE — PLAN OF CARE
"Problem: Suicidal Behavior  Goal: Suicidal Behavior is Absent or Managed  Outcome: Progressing     Problem: Psychotic Signs/Symptoms  Goal: Improved Behavioral Control (Psychotic Signs/Symptoms)  Outcome: Progressing  Goal: Improved Mood Symptoms  Outcome: Progressing     Pt is out in the milieu. Pleasant and cooperative on approach. He was dismissive and guarded during assessment stating \"I'm fine\". Pt was compliant with blood glucose check approaching RN before dinner. . He reported toothache at HS and requested PRN tylenol with relief. Pt took his jarvised medication with no evidence of cheeking.   He complained of nasal congestion at HS. Pt asked to have the dehumidifier be brought back to the unit. He used PRN Ocean spray x 1. He also complained of having bumps on his head proximal to R ear. He thinks the medication is causing it. On assessment, bumps appear to be normal contour of head/skull.   Received PRN Gabapentin 300 mg at 2244 for report of neuropathic pain.   Room is blocked due to severe paranoia (See Orders).    2318: Pt came out asking for melatonin and was given. He reports inability to sleep.     "

## 2023-05-11 NOTE — PLAN OF CARE
"  Problem: Psychotic Signs/Symptoms  Goal: Optimal Cognitive Function (Psychotic Signs/Symptoms)  Outcome: Not Progressing   Goal Outcome Evaluation:    Plan of Care Reviewed With: patient        Patient anxious, agitated, and delusional today. He is upset with us for \"Keeping him here against his will\". At the top of his lungs he accuses the police of racially profiling him, and that unknown persons are conspiring to keep him here indefinitely. He feels staff here are in on the conspiracy against him. He is also perseverating about the thermostat and temperature of his room, coming to the  multiple times throughout the day to have the temperature raised and lowered.  He initially refused his evening haldol, concerned that the medication makes him feel drowsy and weird. I told him id be willing to give it to him at bed time if it had such a sedative affect, but he said he wasn't taking the medication at all anymore. I reminded him that haldol was Jarvised and that id be right back with an IM injection. He immediately agreed to take the PO haldol, but wanted to discuss with RANJIT Lott in the morning. Pt has no insight into his mental health diagnoses, but contracts for safety. /81 (BP Location: Right arm)   Pulse 87   Temp 98.3  F (36.8  C) (Oral)   Resp 16   Wt 89.1 kg (196 lb 6.4 oz)   SpO2 98%   BMI 32.68 kg/m  .           "

## 2023-05-11 NOTE — PLAN OF CARE
"  Problem: Psychotic Signs/Symptoms  Goal: Optimal Cognitive Function (Psychotic Signs/Symptoms)  Outcome: Not Progressing   Goal Outcome Evaluation:    Plan of Care Reviewed With: patient      \"I shouldn't be here, I just yelled at the police and they sent me here. I can't do anything here, I need to go home. I'm numb, I can't do anything, my head hurts, my teeth hurt, I can't take it here, I shouldn't be here, can you let me go? There's nothing wrong with me All I do is sleep here.\" Patient was encouraged to attend groups, \"my head hurts, I can't do anything there. I can't ride the bike, it hurts my back. My back hurts from the bed.\" Patient was medicated with Ibuprofen for tooth and back pain with some relief. This afternoon patient visited [place] the dog and pet him several times. States is depressed, \"I don't like being here.\" Denies anxiety and hallucinations. Denies SI and HI. States thoughts are more clear. Has spent most the shift in his room. Patient was offered prn medication but did not respond to offer, just kept talking about how he shouldn't be here. Patient was offered Bengay and Gabapentin for pain and Hydroxyzine for anxiety but did not respond.              " 3

## 2023-05-11 NOTE — PLAN OF CARE
05/10/23 2124   Group Therapy Session   Group Attendance refused to attend group session   Time Session Began 2000   Time Session Ended 2100   Total Time (minutes) 60   Total # Attendees 5   Group Type psychoeducation   Group Topic Covered coping skills/lifestyle management;emotions/expression;problem-solving   Group Session Detail CTC-Group members completed/discussed worksheet on goal planning, obstacles and coping skills.

## 2023-05-12 LAB
GLUCOSE BLDC GLUCOMTR-MCNC: 135 MG/DL (ref 70–99)
GLUCOSE BLDC GLUCOMTR-MCNC: 186 MG/DL (ref 70–99)

## 2023-05-12 PROCEDURE — 250N000013 HC RX MED GY IP 250 OP 250 PS 637: Performed by: NURSE PRACTITIONER

## 2023-05-12 PROCEDURE — 99232 SBSQ HOSP IP/OBS MODERATE 35: CPT | Performed by: NURSE PRACTITIONER

## 2023-05-12 PROCEDURE — H2032 ACTIVITY THERAPY, PER 15 MIN: HCPCS

## 2023-05-12 PROCEDURE — 124N000002 HC R&B MH UMMC

## 2023-05-12 PROCEDURE — G0177 OPPS/PHP; TRAIN & EDUC SERV: HCPCS

## 2023-05-12 RX ORDER — RISPERIDONE 1 MG/1
2 TABLET ORAL EVERY EVENING
Status: DISCONTINUED | OUTPATIENT
Start: 2023-05-12 | End: 2023-05-15

## 2023-05-12 RX ORDER — HALOPERIDOL 5 MG/ML
5 INJECTION INTRAMUSCULAR EVERY EVENING
Status: DISCONTINUED | OUTPATIENT
Start: 2023-05-12 | End: 2023-05-15

## 2023-05-12 RX ADMIN — GABAPENTIN 300 MG: 300 CAPSULE ORAL at 15:00

## 2023-05-12 RX ADMIN — RISPERIDONE 2 MG: 1 TABLET ORAL at 19:24

## 2023-05-12 RX ADMIN — Medication 3 MG: at 22:53

## 2023-05-12 RX ADMIN — FLUTICASONE PROPIONATE 1 SPRAY: 50 SPRAY, METERED NASAL at 08:21

## 2023-05-12 RX ADMIN — INSULIN GLARGINE 15 UNITS: 100 INJECTION, SOLUTION SUBCUTANEOUS at 08:19

## 2023-05-12 RX ADMIN — Medication 250 MG: at 08:19

## 2023-05-12 RX ADMIN — IBUPROFEN 600 MG: 600 TABLET ORAL at 19:22

## 2023-05-12 RX ADMIN — IBUPROFEN 600 MG: 600 TABLET ORAL at 13:17

## 2023-05-12 RX ADMIN — METFORMIN ER 500 MG 2000 MG: 500 TABLET ORAL at 08:19

## 2023-05-12 ASSESSMENT — ACTIVITIES OF DAILY LIVING (ADL)
ORAL_HYGIENE: INDEPENDENT
ADLS_ACUITY_SCORE: 18
HYGIENE/GROOMING: INDEPENDENT
ADLS_ACUITY_SCORE: 18
HYGIENE/GROOMING: INDEPENDENT
DRESS: INDEPENDENT
DRESS: SCRUBS (BEHAVIORAL HEALTH)
LAUNDRY: WITH SUPERVISION
ADLS_ACUITY_SCORE: 18
ADLS_ACUITY_SCORE: 18
ORAL_HYGIENE: INDEPENDENT
ADLS_ACUITY_SCORE: 18

## 2023-05-12 NOTE — PLAN OF CARE
OT PROGRESS NOTE:     05/12/23 1402   Group Therapy Session   Group Attendance attended group session   Time Session Began 1315   Time Session Ended 1400   Total Time (minutes) 45   Total # Attendees 4   Group Type recreation;task skill   Group Topic Covered balanced lifestyle;leisure exploration/use of leisure time;problem-solving   Group Session Detail Sequence game to promote social interaction, exploration of various games/leisure activities to positively structure time, recharge self  plan, problem solve and strategize.       Patient Response/Contribution cooperative with task;discussed personal experience with topic;disorganized;expressed understanding of topic   Patient Participation Detail Noted he was familiar with game, however, needed all directions verbalized. Somewhat argumentative and interrupted writer and peers when sharing directions with him. Was more interested in defensive actions vs making points for himself. Ignored what his partner was noted he should do. None the less he won and rubbed it in others faces. Lacks social graces and good sportsmanship.

## 2023-05-12 NOTE — PROGRESS NOTES
[facility],  Psychiatric Progress Note      Impression:     Alexander Mccoy is a 38-year-old male admitted to [address] on 4/22/2023.  He was admitted on a court hold from the [facility] ER due to psychosis.  He stated that he went to a police station because he believed undercover governmental agents were tracking him.  He said he no longer has a job because the government agents had infiltrated through his phone and computer.  He became involved in an altercation with the  at the [facility].  UTOX was negative and he denies substance abuse.  He was not taking any psychotropic medications prior to admission.  While in the ER, he was intermittently agitated.  He called 911.  During his time in the ER, a petition for commitment MI with [facility] was filed in [facility], and he was placed on a court hold.  He has type 2 diabetes.  HbA1c was 10.9.  He reports taking Metformin XR prior to admission.  While in the ER, he was inconsistently adherent to insulin and BG checks.  During the admission assessment, he reports rarely checking his blood glucose and states that his BG never exceeds 200, however BGs have often been over 300 since he was in the ER, which he relates to stress.  He says if he were not in the hospital, they would be lower.   He was not taking any psychotropic medications prior to admission.  Abilify was initiated in the ER, but he refused the second dose.  He states he will not take psychotropic medications nor consent to labs or insulin while hospitalized.  Throughout the conversation, he rarely responded directly to provider's inquiries and instead made comments about being tracked and the injustices of being hospitalized.  Since admission, Abilify was increased, and he refused 3/4 doses.  Abilify was then discontinued and replaced with Haldol; he took 2 doses and then refused the rest until Kristofer was  enacted.  Due to his complaints of side effects including drowsiness, Haldol was discontinued and replaced with Risperdal on 5/11.  PRNs of Melatonin, Hydroxyzine and Zyprexa were initiated.  He initially refused BG checks and insulin but has been adhering since the evening of 4/22 onward.   He is committed and Jarvised as of 5/2.  Upon receiving court paperwork, he kicked and punched the exit door and a behavioral code was called.  He subsequently was agreeable to taking medications and going to his room, so he was not placed in seclusion.  On 5/10 he pounded his fist on the desk, stating he wanted to discharge.  He continues to have paranoid delusions and poor insight.  Sleep is improved.           Diagnoses:     Unspecified psychosis, likely paranoid schizophrenia  Type 2 diabetes, insulin dependent  Low back pain with left-sided sciatica  Mobility on left lower molar         Plan:     Medications:  Increase Risperdal  to 2mg at HS with a back up of IM Haldol 5 mg per [facility].  Continue PRNs of Melatonin, Hydroxyzine and Zyprexa.      He is committed MI with [facility] (Abilify, Risperdal, Haldol, Zyprexa) in [facility].     Internal medicine signed off on diabetes management on 5/10.  He reports using FreeStyle Candi at home for blood glucose monitoring.     First episode psychosis labs were unremarkable.     He has a dental appointment 5/16 at 0900 in the [facility] due to mobility on left lower molar.    He lives with his parents.  Likely plan for IRTS placement.  He does not have outpatient providers. Plan for ENT referral for tinnitus.          Attestation:  Patient has been seen and evaluated by me, Savannah Pope, APRN CNP  The patient was counseled on nature of illness and treatment plan/options  Care was coordinated with treatment team  Total time > 35 minutes           Interim History:     The patient's care was discussed with the treatment team and chart notes were reviewed.  Pt was  "documented as sleeping 6.15 hours during the overnight shift.  He did not attend groups yesterday but did pet the therapy dog who visited.  He used PRNs of Ocean Spray, Melatonin, Neurontin, Ibuprofen and Tylenol.  Pt states that he finds Risperdal a much better option for him compared to Haldol.  He said he didn't feel drowsy when he woke up and denies any side effects.  Insight remains poor.  Pt said, \"I've been thinking about how 1 coincidence can change my entire life.\"  Pt talked about how he described a dream about a war in [facility] to a friend, and then \"people started tormenting me with numbers with triple digits.\"  Pt continues to insist, \"I'm not psychotic.\"  Pt states that he struck the desk a few days ago because of side effects from Haldol  Pt sought reassurance that he will not be hospitalized for \"years\" and expressed relief when provider informed him that his total length of hospitalization is unlikely to exceed a couple months, depending on his progress and response to meds.           Medications:     Current Facility-Administered Medications   Medication     acetaminophen (TYLENOL) tablet 650 mg     alum & mag hydroxide-simethicone (MAALOX) suspension 30 mL     benztropine (COGENTIN) tablet 1 mg     glucose gel 15-30 g    Or     dextrose 50 % injection 25-50 mL    Or     glucagon injection 1 mg     fluticasone (FLONASE) 50 MCG/ACT spray 1 spray     gabapentin (NEURONTIN) capsule 300 mg     risperiDONE (risperDAL) tablet 2 mg    Or     haloperidol lactate (HALDOL) injection 5 mg     hydrOXYzine (ATARAX) tablet 25 mg     ibuprofen (ADVIL/MOTRIN) tablet 600 mg     insulin glargine (LANTUS PEN) injection 15 Units     melatonin tablet 3 mg     menthol (Topical Analgesic) 2.5% (BENGAY VANISHING SCENT) 2.5 % topical gel     metFORMIN (GLUCOPHAGE XR) 24 hr tablet 2,000 mg     OLANZapine (zyPREXA) tablet 10 mg    Or     OLANZapine (zyPREXA) injection 10 mg     senna-docusate (SENOKOT-S/PERICOLACE) 8.6-50 MG " per tablet 1 tablet     sodium chloride (OCEAN) 0.65 % nasal spray 1 spray     vitamin C (ASCORBIC ACID) tablet 250 mg             Allergies:   No Known Allergies         Psychiatric Examination:     /69 (Cuff Size: Adult Large)   Pulse 96   Temp (!) 96.4  F (35.8  C) (Oral)   Resp 18   Wt 89.1 kg (196 lb 6.4 oz)   SpO2 95%   BMI 32.68 kg/m      Appearance:  awake, alert, disheveled, dressed in scrubs  Attitude:  cooperative  Eye Contact:  fair  Mood:  frustrated  Affect:  mildly increased intensity  Speech:  clear, coherent, normal volume   Psychomotor Behavior:  no evidence of tardive dyskinesia, dystonia, or tics  Thought Process:  illogical, somewhat disorganized   Associations:  no loose associations  Thought Content:  denies suicidal and homicidal ideation, paranoid delusions are present but less pervasive compared to admission, denies hallucinations  Insight:  poor  Judgment:  limited  Oriented to:  date, time, person, and place  Attention Span and Concentration:  fair  Recent and Remote Memory:  fair  Language:  intact, fluent English  Fund of Knowledge:  appropriate  Muscle Strength and Tone:  normal  Gait and Station:  normal          Labs:     Recent Results (from the past 24 hour(s))   Glucose by meter    Collection Time: 05/11/23  4:45 PM   Result Value Ref Range    GLUCOSE BY METER POCT 159 (H) 70 - 99 mg/dL   Glucose by meter    Collection Time: 05/12/23  8:14 AM   Result Value Ref Range    GLUCOSE BY METER POCT 186 (H) 70 - 99 mg/dL

## 2023-05-12 NOTE — PLAN OF CARE
"  Problem: Psychotic Signs/Symptoms  Goal: Improved Behavioral Control (Psychotic Signs/Symptoms)  Outcome: Progressing   Goal Outcome Evaluation:         Patient was compliant with diabetic cares this morning. His BG was 184 before breakfast today. Patient was compliant with all scheduled medication. Patient said he is sleeping and eating well. He denied depression, anxiety, suicidal thoughts, AH and VH. Patient continues to demonstrate limited insight into his mental health. He had no physical complaints so far this shift.  Nursing continues to monitor.  Blood pressure 102/69, pulse 96, temperature (!) 96.4  F (35.8  C), temperature source Oral, resp. rate 18, weight 89.1 kg (196 lb 6.4 oz), SpO2 95 %.     Patient requested Ibuprofen and received prn for back pain 7/10 @ 1317.  Patient requested the \"yellow pill for nerve pain\" and  received Neurontin prn at 1500.    "

## 2023-05-12 NOTE — PLAN OF CARE
"OT PROGRESS NOTE:     05/12/23 1109   Group Therapy Session   Group Attendance attended group session   Time Session Began 0915   Time Session Ended 1100   Total Time (minutes) 45   Total # Attendees 5   Group Type expressive therapy;task skill   Group Topic Covered balanced lifestyle;coping skills/lifestyle management;emotions/expression;leisure exploration/use of leisure time;problem-solving   Group Session Detail Crafts/creative expression to promote focus, concentration, exploration of healthy creative activities for time management, structure, planning problem solving and organization.      Patient Response/Contribution discussed personal experience with topic;disorganized   Patient Participation Detail Initiated group late, \"over slept\". Surprisingly asked to work on a project but, wanted writer to pick one out for him. Declined what was offered and followed the lead of peer. Discussed finishing process and agreed, however, after 10-15 minutes became bored and didn't want to finish. Gave it to peer to complete. Low frustration tolerance, poor planning and problem solving skills, low motivation and interest.                             "

## 2023-05-13 LAB
GLUCOSE BLDC GLUCOMTR-MCNC: 130 MG/DL (ref 70–99)
GLUCOSE BLDC GLUCOMTR-MCNC: 227 MG/DL (ref 70–99)

## 2023-05-13 PROCEDURE — 124N000002 HC R&B MH UMMC

## 2023-05-13 PROCEDURE — 250N000013 HC RX MED GY IP 250 OP 250 PS 637: Performed by: PSYCHIATRY & NEUROLOGY

## 2023-05-13 PROCEDURE — H2032 ACTIVITY THERAPY, PER 15 MIN: HCPCS

## 2023-05-13 PROCEDURE — 250N000013 HC RX MED GY IP 250 OP 250 PS 637: Performed by: NURSE PRACTITIONER

## 2023-05-13 PROCEDURE — G0177 OPPS/PHP; TRAIN & EDUC SERV: HCPCS

## 2023-05-13 RX ADMIN — Medication 3 MG: at 23:00

## 2023-05-13 RX ADMIN — IBUPROFEN 600 MG: 600 TABLET ORAL at 18:00

## 2023-05-13 RX ADMIN — SALINE NASAL SPRAY 1 SPRAY: 1.5 SOLUTION NASAL at 00:03

## 2023-05-13 RX ADMIN — RISPERIDONE 2 MG: 1 TABLET ORAL at 21:20

## 2023-05-13 RX ADMIN — INSULIN GLARGINE 15 UNITS: 100 INJECTION, SOLUTION SUBCUTANEOUS at 08:34

## 2023-05-13 RX ADMIN — FLUTICASONE PROPIONATE 1 SPRAY: 50 SPRAY, METERED NASAL at 08:38

## 2023-05-13 RX ADMIN — Medication 250 MG: at 08:34

## 2023-05-13 RX ADMIN — FLUTICASONE PROPIONATE 1 SPRAY: 50 SPRAY, METERED NASAL at 21:20

## 2023-05-13 RX ADMIN — METFORMIN ER 500 MG 2000 MG: 500 TABLET ORAL at 08:34

## 2023-05-13 RX ADMIN — HYDROXYZINE HYDROCHLORIDE 25 MG: 25 TABLET, FILM COATED ORAL at 00:17

## 2023-05-13 RX ADMIN — HYDROXYZINE HYDROCHLORIDE 25 MG: 25 TABLET, FILM COATED ORAL at 23:00

## 2023-05-13 RX ADMIN — GABAPENTIN 300 MG: 300 CAPSULE ORAL at 12:27

## 2023-05-13 ASSESSMENT — ACTIVITIES OF DAILY LIVING (ADL)
ADLS_ACUITY_SCORE: 18
DRESS: SCRUBS (BEHAVIORAL HEALTH)
ADLS_ACUITY_SCORE: 18
DRESS: SCRUBS (BEHAVIORAL HEALTH)
ADLS_ACUITY_SCORE: 18
LAUNDRY: WITH SUPERVISION
ADLS_ACUITY_SCORE: 18
ORAL_HYGIENE: INDEPENDENT
ADLS_ACUITY_SCORE: 18
LAUNDRY: WITH SUPERVISION
ORAL_HYGIENE: INDEPENDENT
ADLS_ACUITY_SCORE: 18
HYGIENE/GROOMING: INDEPENDENT
HYGIENE/GROOMING: INDEPENDENT

## 2023-05-13 NOTE — PLAN OF CARE
"Goal Outcome Evaluation:    Plan of Care Reviewed With: patient      Patient was visible in the milieu for the majority of the shift. He was polite and cooperative with staff and interacted appropriately with peers. Patient attended and participated in group. Patient's affect was flat his mood was calm. Patient denied all mental health concerns including SI/SIB/AVH/HI stating \"I feel fine, I don't have any depression or anything\". Patient was medication compliant, received ibuprofen PRN for tooth and low back pain that they rated 4/10. Patient stated medication was helpful. VSS. No behavioral concerns this shift.     Patient remains on assault, cheeking, and elopement precautions. Patient has active order for no roommate due to severe paranoia, delusional thoughts.     /84 (BP Location: Right arm)   Pulse 97   Temp 98.8  F (37.1  C) (Oral)   Resp 16   Wt 89.1 kg (196 lb 6.4 oz)   SpO2 96%   BMI 32.68 kg/m                "

## 2023-05-13 NOTE — PLAN OF CARE
Goal Outcome Evaluation:     Pt awake at start of shift. Recieved PRN hydroxyzine and saline nasal spray. Later observed in bed, breathing quiet and unlabored. Pt slept through rest of shift. Pt slept 5.25 hours.      No pt complaints or concerns at this time.      Patient remains on assault, cheeking and elopement precautions. Will continue to monitor.

## 2023-05-13 NOTE — PLAN OF CARE
Goal Outcome Evaluation:    Pt reports having good energy and feeling well. Reports tooth pain '3'/10 -prn Ibuprofen given, declined cold pack. Blood glucose check was 227. Later reported intervention as effective. Denied anxiety and depression. Said no SI/HA and is alberta for safety. Pt said he likes the Risperdal much better than the Haldol. Attended groups. Reports he enjoyed listening to music. Has been calm and social with peers. Compliant with medication and mouth check.

## 2023-05-13 NOTE — PLAN OF CARE
"  Problem: Psychotic Signs/Symptoms  Goal: Improved Behavioral Control (Psychotic Signs/Symptoms)  Outcome: Progressing   Goal Outcome Evaluation:       Patient told this RN to \"tell the city that I don't need to be here. The , the DA, the city, they're all keeping me here.\" Patient continues to express paranoia and distrust. Patient said he is so bored here. Patient had Gabapentin prn for \"nerve pain.\" Blood glucose this morning was 130. Patient was compliant with  medications. He only slept 5.25 hours last night. He attended group this afternoon.  Nursing continues to monitor.  Blood pressure 117/87, pulse 104, temperature 97.8  F (36.6  C), temperature source Oral, resp. rate 16, weight 89.1 kg (196 lb 6.4 oz), SpO2 100 %.                  "

## 2023-05-13 NOTE — PLAN OF CARE
"Appointment   May 12th at 11AM    Tasks Complete:    Chart review     Team meeting     Pt obtained letter from his parents regarding past income.[facility] mailed benefits application. PT noted to [facility] \" they just dropped off some old lady here last night. She cant remember anything. This never happened to her before\" [facility] noted that everyone is here for there own reason and need help despite their age. Pt receptive.      Current Symptoms include the following: See RN note     Addressed patient needs/concerns: See above intervention    Discharge Plan or Goal   Plan  TBD likely discharge to an IRTS     Commitment  Commited and Jarvised 05/02/2023-11/02/2023  [id number]    Care Team   No outpatient care team   Father - Gokul Mccoy ([phone])  Civil commitment - Fátima Nunez- [phone]     Barriers to Discharge   Ongoing stabilization  Committed and Jarvised      Referral Status  PT would benefit from  such as case management, psychiatry, OP programming. No referrals have been made as of yet.      Legal Status  Committed and Jarvised        "

## 2023-05-13 NOTE — PLAN OF CARE
"   05/12/23 2100   Group Therapy Session   Group Attendance attended group session   Time Session Began 2000   Time Session Ended 2045   Total Time (minutes) 45   Total # Attendees 7   Group Type expressive therapy   Group Topic Covered emotions/expression   Patient Response/Contribution cooperative with task     Art Therapy directive was to create two drawings and/or hand tracings (using hands as visual metaphor) for expressing what pt wants to \"hold on to and let go of.\"  Goals of directive: emotional expression, identifying personal strengths and goals, future focused directive, assessing motivation for change.  Pt was an engaged participant, focused on task for the full duration of group.  Pt finished artwork and briefly shared with group.   Pts mood was calm, pleasant participant.                           "

## 2023-05-13 NOTE — PROGRESS NOTES
"   05/13/23 1454   Group Therapy Session   Group Attendance attended group session   Time Session Began 1315   Time Session Ended 1400   Total Time (minutes) 45   Total # Attendees 5   Group Type recreation   Group Session Detail Education and group discussion provided on the negative impact mental illness and chemical dependency has on the practice of healthy leisure in one s life, along with hands on Garbage Card Game for concentration, sequencing, simple problem solving, formulating simple strategy, light cognitive processing, coping, healthy leisure exploration, mood stabilization, reality-based activity, encouraging a balanced daily routine, an opportunity to experience success, and socialization.   Patient Participation Detail Pt asked at the beginning of the group session, \"Does coming to groups mean I can leave sooner?\"  A peer told him it looked good for the doctor to see that you were attending groups.  Therapist encouraged him to try to stay as long as he felt able.  Pt had low energy, low motivation and complained of being tired with blunted affect.  However as group progressed and pt was successful and in the lead, his affect brightened significantly.  His mood improved, he had more energy and was much more invested in the activity.  Pt was able to learn the concrete game with repetition and occasional cues for accuracy.  Pt's awareness of social situations is compromised as he failed to recognize that a peer was becoming frustrated (as he was not having success) and he unintentionally rubbed in the fact that he was winning.  He was oblivious that this was interpreted as gloating by his peer, even though this writer believes he was simply celebrating and he did not mean it as disrespectful towards his peers.       "

## 2023-05-14 LAB
GLUCOSE BLDC GLUCOMTR-MCNC: 135 MG/DL (ref 70–99)
GLUCOSE BLDC GLUCOMTR-MCNC: 141 MG/DL (ref 70–99)

## 2023-05-14 PROCEDURE — 250N000013 HC RX MED GY IP 250 OP 250 PS 637: Performed by: PSYCHIATRY & NEUROLOGY

## 2023-05-14 PROCEDURE — 250N000013 HC RX MED GY IP 250 OP 250 PS 637: Performed by: NURSE PRACTITIONER

## 2023-05-14 PROCEDURE — 124N000002 HC R&B MH UMMC

## 2023-05-14 PROCEDURE — 250N000013 HC RX MED GY IP 250 OP 250 PS 637

## 2023-05-14 RX ADMIN — Medication 250 MG: at 08:26

## 2023-05-14 RX ADMIN — CARBAMIDE PEROXIDE 6.5% 5 DROP: 6.5 LIQUID AURICULAR (OTIC) at 14:39

## 2023-05-14 RX ADMIN — GABAPENTIN 300 MG: 300 CAPSULE ORAL at 18:26

## 2023-05-14 RX ADMIN — GABAPENTIN 300 MG: 300 CAPSULE ORAL at 10:54

## 2023-05-14 RX ADMIN — INSULIN GLARGINE 15 UNITS: 100 INJECTION, SOLUTION SUBCUTANEOUS at 08:26

## 2023-05-14 RX ADMIN — SALINE NASAL SPRAY 1 SPRAY: 1.5 SOLUTION NASAL at 22:18

## 2023-05-14 RX ADMIN — RISPERIDONE 2 MG: 1 TABLET ORAL at 19:18

## 2023-05-14 RX ADMIN — Medication 3 MG: at 21:29

## 2023-05-14 RX ADMIN — METFORMIN ER 500 MG 2000 MG: 500 TABLET ORAL at 08:26

## 2023-05-14 RX ADMIN — FLUTICASONE PROPIONATE 1 SPRAY: 50 SPRAY, METERED NASAL at 19:18

## 2023-05-14 RX ADMIN — IBUPROFEN 600 MG: 600 TABLET ORAL at 15:53

## 2023-05-14 RX ADMIN — FLUTICASONE PROPIONATE 1 SPRAY: 50 SPRAY, METERED NASAL at 08:27

## 2023-05-14 RX ADMIN — HYDROXYZINE HYDROCHLORIDE 25 MG: 25 TABLET, FILM COATED ORAL at 21:28

## 2023-05-14 ASSESSMENT — ACTIVITIES OF DAILY LIVING (ADL)
ADLS_ACUITY_SCORE: 18
DRESS: INDEPENDENT
ADLS_ACUITY_SCORE: 18
LAUNDRY: WITH SUPERVISION
HYGIENE/GROOMING: INDEPENDENT
ADLS_ACUITY_SCORE: 18
ADLS_ACUITY_SCORE: 18
ORAL_HYGIENE: INDEPENDENT
LAUNDRY: WITH SUPERVISION
ADLS_ACUITY_SCORE: 18
DRESS: INDEPENDENT;SCRUBS (BEHAVIORAL HEALTH)
HYGIENE/GROOMING: INDEPENDENT
ADLS_ACUITY_SCORE: 18
ORAL_HYGIENE: INDEPENDENT

## 2023-05-14 NOTE — PLAN OF CARE
05/13/23 2200   Group Therapy Session   Group Attendance attended group session   Time Session Began 2000   Time Session Ended 2045   Total Time (minutes) 45   Total # Attendees 7   Group Type expressive therapy   Group Topic Covered emotions/expression   Patient Response/Contribution cooperative with task     Pt attended 45 minutes of Art Therapy group this evening. Pt's were given an option of a directive or non-directive watercolor painting project. Pt's were encouraged to try mindfulness skills while painting to relaxation music.  Goals: emotional expression, emotional regulation, mindfulness, media exploration.  Pt was an engaged participant, focused on task for the full duration of group.  Pt finished painting and briefly shared with group. Pt created an abstract painting of lines, shapes and colors inspired by pts azeb/spirituality.  Pts mood was calm, pleasant participant.

## 2023-05-14 NOTE — PLAN OF CARE
"Pt would like to talk to provider about \"ringing in the ears\" that has been going on for 2 years. Pt states ringing varies in intensity and requests ear irrigation. On-call psychiatry provider and on-call internal medicine provider were contacted. Internal medicine provider prescribed Debrox otic drops. Pt tried the ear drops and wishes to discontinue them due to being \"too oily.\"     Pt describes his mood as \"good\" and denies anxiety and depression symptoms. His affect is flat, however. He denies suicidal ideation, SIB urges, homicidal ideation, A/V/G/T/O hallucinations. Pt endorsed \"nerve pain\" and was given 300 mg of gabapentin.     Writer did not observe pt expressing paranoid thoughts this shift. Pt has poor insight and judgement into his condition. He approached writer and said, \"I am not mentally ill. I don't need to be here. This is all a misunderstanding. I don't like being around these other mentally ill people.\"     Problem: Psychotic Signs/Symptoms  Goal: Improved Behavioral Control (Psychotic Signs/Symptoms)  Outcome: Progressing  Goal: Increased Participation and Engagement (Psychotic Signs/Symptoms)  Outcome: Progressing  Goal: Enhanced Social, Occupational or Functional Skills (Psychotic Signs/Symptoms)  Outcome: Progressing  Intervention: Promote Social, Occupational and Functional Ability  Recent Flowsheet Documentation  Taken 5/14/2023 1107 by Osorio Maher, RN    Goal Outcome Evaluation:    Plan of Care Reviewed With: patient                   "

## 2023-05-14 NOTE — PLAN OF CARE
Problem: Sleep Disturbance  Goal: Adequate Sleep/Rest  Outcome: Progressing     Patient slept approximately 6.75 hours, safety checks and precautions in place, woke up at about 00:30 am, complained of ringing in the ears, patient declined tylenol and stated he will address it today. Denies pain, no prn given or requested. Will monitor patient  as needed for the rest of the shift.

## 2023-05-15 LAB
GLUCOSE BLDC GLUCOMTR-MCNC: 169 MG/DL (ref 70–99)
GLUCOSE BLDC GLUCOMTR-MCNC: 170 MG/DL (ref 70–99)

## 2023-05-15 PROCEDURE — 124N000002 HC R&B MH UMMC

## 2023-05-15 PROCEDURE — G0177 OPPS/PHP; TRAIN & EDUC SERV: HCPCS

## 2023-05-15 PROCEDURE — H2032 ACTIVITY THERAPY, PER 15 MIN: HCPCS

## 2023-05-15 PROCEDURE — 99232 SBSQ HOSP IP/OBS MODERATE 35: CPT | Performed by: NURSE PRACTITIONER

## 2023-05-15 PROCEDURE — 250N000013 HC RX MED GY IP 250 OP 250 PS 637: Performed by: NURSE PRACTITIONER

## 2023-05-15 RX ORDER — RISPERIDONE 3 MG/1
3 TABLET ORAL EVERY EVENING
Status: DISCONTINUED | OUTPATIENT
Start: 2023-05-15 | End: 2023-05-18

## 2023-05-15 RX ORDER — HALOPERIDOL 5 MG/ML
5 INJECTION INTRAMUSCULAR EVERY EVENING
Status: DISCONTINUED | OUTPATIENT
Start: 2023-05-15 | End: 2023-05-18

## 2023-05-15 RX ADMIN — RISPERIDONE 3 MG: 3 TABLET ORAL at 20:00

## 2023-05-15 RX ADMIN — METFORMIN ER 500 MG 2000 MG: 500 TABLET ORAL at 08:16

## 2023-05-15 RX ADMIN — Medication 3 MG: at 22:20

## 2023-05-15 RX ADMIN — GABAPENTIN 300 MG: 300 CAPSULE ORAL at 11:47

## 2023-05-15 RX ADMIN — INSULIN GLARGINE 15 UNITS: 100 INJECTION, SOLUTION SUBCUTANEOUS at 08:16

## 2023-05-15 RX ADMIN — FLUTICASONE PROPIONATE 1 SPRAY: 50 SPRAY, METERED NASAL at 08:17

## 2023-05-15 RX ADMIN — FLUTICASONE PROPIONATE 1 SPRAY: 50 SPRAY, METERED NASAL at 20:00

## 2023-05-15 RX ADMIN — IBUPROFEN 600 MG: 600 TABLET ORAL at 15:54

## 2023-05-15 RX ADMIN — Medication 250 MG: at 08:16

## 2023-05-15 RX ADMIN — GABAPENTIN 300 MG: 300 CAPSULE ORAL at 04:27

## 2023-05-15 ASSESSMENT — ACTIVITIES OF DAILY LIVING (ADL)
ADLS_ACUITY_SCORE: 18
ADLS_ACUITY_SCORE: 18
DRESS: INDEPENDENT
ADLS_ACUITY_SCORE: 18
DRESS: INDEPENDENT
ADLS_ACUITY_SCORE: 18
LAUNDRY: WITH SUPERVISION
ORAL_HYGIENE: INDEPENDENT
ADLS_ACUITY_SCORE: 18
ADLS_ACUITY_SCORE: 18
HYGIENE/GROOMING: INDEPENDENT
ORAL_HYGIENE: INDEPENDENT
ADLS_ACUITY_SCORE: 18
HYGIENE/GROOMING: INDEPENDENT
ADLS_ACUITY_SCORE: 18
LAUNDRY: WITH SUPERVISION
ADLS_ACUITY_SCORE: 18

## 2023-05-15 NOTE — PLAN OF CARE
Problem: Suicidal Behavior  Goal: Suicidal Behavior is Absent or Managed  Outcome: Progressing  Flowsheets (Taken 5/15/2023 1607)  Mutually Determined Action Steps (Suicidal Behavior Absent/Managed): sets future-oriented goal     Goal Outcome Evaluation:    Pt talked about missing his peers that have discharged recently. Pt also talked about looking forward to discharging. Said he can work with his dad doing  work. Also said he hopes he will be able to discharge in the next few weeks. Pt complained about [place] police and said its unfair he was brought to the hospital. Continued to complain that he did nothing wrong and was brought here anyway. Blood glucose check was 170. Pt asked about diabetes and how to get rid of it. Pt given education on diabetes and Metformin per his request. Had c/o tooth pain rated at '5'/10 -prn Ibuprofen given. Pt reported intervention effective at pain reassess. Said his mood is good. Denied anxiety, depression, SI/HA. The patient is alberta for safety. Attended group. Refused Debrox ear gtts.

## 2023-05-15 NOTE — PLAN OF CARE
Goal Outcome Evaluation:    Plan of Care Reviewed With: patient      Patient was visible in the milieu for the majority of the shift. He was polite and cooperative with staff and interacted appropriately with peers. Patient's affect was flat his mood was calm though he did endorse mild anxiety at start of shift. Patient denied all other mental health concerns including SI/SIB/AVH/HI. Patient was medication compliant. He received PRN hydroxyzine, melatonin, gabapentin and ibuprofen. Patient endorsed 5/10 low back pain, after ibuprofen and gabapentin he stated pain was 2/10. Patient at 100% of meals and snacks. His parents visited [facility] and he reports visit went well. Patient's mother requested that letter that she brought in be read by his nurses and provider, note was placed in front of patient's physical chart. Patient's parents also brought in toothpaste for him to use, no PCO for personal toothpaste. RN encouraged patient to talk with provider about getting order for use of personal toothpaste, toothpaste was placed in patient's belongings. No behavioral concerns this shift.    Patient remains on assault, elopement and cheeking precautions. Patient has active order for no roommate due to severe paranoia, delusional thoughts, and aggression towards property.     /86   Pulse 88   Temp 97.8  F (36.6  C) (Oral)   Resp 16   Wt 88.5 kg (195 lb 1.6 oz)   SpO2 96%   BMI 32.47 kg/m
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
## 2023-05-15 NOTE — PLAN OF CARE
"Appointment   CM appointment 5/17 @10AM  Dental appointment 5/15 @ 9AM    Tasks Complete:    Chart review     Team meeting     [facility] notified CM that pt has dental appointment tomorrow and will not be able to meet with the CM during CM's preferred time. CM will see pt 5/17 @10AM    CTC met with pt to discuss pt request for resources surrounding social security benefits. [facility] provided a list of 's that assist with applying for benefits and how to apply. Pt inquired about discharge planning noting\" am I going to be here for years?\" CTC reiterated the same message that provider gave pt regarding discharge planning. Pt receptive. Pt notes he likes the RC better than Haldol.[facility] also updated pt regarding upcoming CM meeting    Current Symptoms include the following: See RN note     Addressed patient needs/concerns: See above intervention    Discharge Plan or Goal   Plan  TBD likely discharge to an IRTS     Commitment  Commited and Jarvised 05/02/2023-11/02/2023  [id number]    Care Team   No outpatient care team   Father - Gokul Mccoy ([phone])  Civil commitment - Fátima Nunez- [phone]     Barriers to Discharge   Ongoing stabilization  Committed and Jarvised      Referral Status  PT would benefit from  such as case management, psychiatry, OP programming. No referrals have been made as of yet.      Legal Status  Committed and Jarvised        "

## 2023-05-15 NOTE — PLAN OF CARE
"Pt appeared to be sleeping at the start of the night.  He is awake at approximately 3:45 am but remained calmly sitting in his room for some time.  He approached writer at 4:25 am to request PRN Gabapentin for his \"nerves\" discomfort.  Pt reports back and side pain he attributes to sleeping position.  Pt stated \"I had good sleep.  I slept since 8 o'clock\".    Pt remains awake and occasionally walking the hallway.  No paranoid talk observed.  Pt walked back to his room after getting ice water.  Approximately 4 hours of sleep is recorded tonight.   "

## 2023-05-15 NOTE — PLAN OF CARE
"   05/15/23 7320   Individualization/Patient Specific Goals   Patient Personal Strengths expressive of emotions;positive educational history;positive vocational history;stable living environment   Patient Vulnerabilities family/relationship conflict;lacks insight into illness;occupational insecurity;limited support system   Anxieties, Fears or Concerns fears of being in the hospital \" forever\"   Interprofessional Rounds   Summary He is committed and Jarvised. Pt lacks insight to his mental illness but is showing slight improvement regarding his MI. He continues to go to groups and take his medications. He is currently undergoing med adjustments and requires ongoing stabilization as he is still symptomatic. Plan to discharge home with OP supports or IRTS   Participants advanced practice nurse;CTC;nursing   Behavioral Team Discussion   Participants Provider: Savannah Pope,NP, CTC: Kasandra DRISCOLL [facility], Nursing:  Reyna PEDRAZA   Progress Requires ongoing stabilization   Anticipated length of stay 2-4 weeks   Continued Stay Criteria/Rationale ongoing statbilization   Medical/Physical dx with diabeties type 2- pt has BG regime   Precautions see below   Plan IRTS or home with OP services   Anticipated Discharge Disposition other (see comments)     Goal Outcome Evaluation:PRECAUTIONS AND SAFETY    Behavioral Orders   Procedures    Assault precautions    Cheeking Precautions (behavioral units)     Patient Observed swallowing PO medications; Patient asked to drink water after swallowing medication; Patient in Staff line of sight for 15 minutes after medication given; Mouth checks after PO administration (patient asked to open mouth and stick out their tongue).    Code 1 - Restrict to Unit    Elopement precautions    Petition for commitment     MI with Miner (requested meds Abilify, Risperdal, Haldol, Zyprexa) in [facility].    Routine Programming     As clinically indicated    Status 15     Every 15 minutes. "       Safety  Safety WDL: WDL  Patient Location: patient room, own  Observed Behavior: sleeping  Observed Behavior (Comment): Talking on phone  Safety Measures: safety rounds completed, suicide assessment completed, environmental rounds completed  Diversional Activity: music, television  Suicidality: Status 15  Assault: status 15, private room  Elopement Assessment: Hypervigilance to activities on and off the unit, Distress about legal situation (holds for mental health or criminal), Statements about wanting to leave  Elopement Interventions: status 15, room away from unit doors, behavioral scrubs (pajamas), signs posted on unit entrance / exit doors  Additional Documentation:  (Cheeking precuations)

## 2023-05-15 NOTE — PROGRESS NOTES
05/15/23 1637   Group Therapy Session   Group Attendance attended group session   Time Session Began 1315   Time Session Ended 1400   Total Time (minutes) 45   Total # Attendees 4   Group Type life skill   Group Session Detail Education and group discussion provided on the sensory system, alerting and calming techniques and which type is of benefit in what circumstances, along with hands on practice of non-tool based sensory exercises for improving self-awareness, concentration, large and small motor movement, soothing the sensory system, calming, coping, alerting, grounding, mood stabilization, expanding social skills and eliciting a relaxation response.   Patient Participation Detail Pt was easily distractible and preoccupied with female peer who was in the group.  Pt needed to be redirected back to the materials and sensory exercises several times as he was often trying to talk alone with her.  Pt needed encouragement to put forth even minimal effort into the movements and complained that there was education with the exercises.

## 2023-05-15 NOTE — PROGRESS NOTES
"   05/15/23 1620   Group Therapy Session   Group Attendance attended group session   Time Session Began 1015   Time Session Ended 1200   Total Time (minutes) 60   Total # Attendees 5   Group Type task skill   Group Session Detail Education provided on goal setting and cultivating change via the use of a jigsaw puzzle metaphor.  Continued reinforcement of this topic during challenges with hands on jigsaw puzzle activity for visual-spatial reasoning, trial and error, initiation and follow through, motivation, delayed gratification, frustration tolerance, FM skills, recognizing and correcting errors, utilizing strategy and ability to shift combine strategies when needed (i.e. sorting pieces by color, number of points, shape, framing first, moving to another part of the puzzle when progress slows, etc.), coping, mood stabilization, reality- based activity, an opportunity to experience success and socialization.   Patient Participation Detail Pt joined group but had low energy, low motivation and gives up before any attempt is made to face a challenge.  With a lot of encouragent continually given throughout the group session, pt did participate on and off.  He was restless and in and out of group for the 105 min on and off.  Pt needed prompting to just attempt to place puzzle pieces, even when therapist showed him the ways to \"guess\" if they would fit by looking at color, shape, sides, etc.  He kept insisting pieces were missing, though they were all there.  As progress was made and the picture of the puzzle was coming together pt remained for longer durations of time, though often would  a piece and ask the therapist where to put it.  Pt was again encouraged to utilize trial and error as that is a common problem solving skill that equates both to life and jigsaw puzzles.  Due to therapist having good rapport with pt she did point out that he often has low motivation.  Pt did not get upset, but did disagree with " "this, stating, \"I come to all of the groups.\"  Therapist acknowledged that yes, he does come to many groups, but often with low energy, putting forth little to no effort, struggles with follow through and is eager to give up often before even attempting the skill offered on his own.  Pt voiced that he feels he is quite energetic.  This is not in align with his body language, affect, nor actual performance.       "

## 2023-05-15 NOTE — PLAN OF CARE
"Problem: Psychotic Signs/Symptoms  Goal: Improved Behavioral Control (Psychotic Signs/Symptoms)  Outcome: Progressing    Pt was given information handouts for risperdal, hydroxyzine, and gabapentin. Pt perseverating on the side effects for risperdal and stated that he doesn't need to take this medication and was irritable that his provider had increased his dosage from 2 mg to 3 mg. He also told staff \"they almost killed me with the haldol medication, I hope they don't kill me with the risperdal.\" Pt refused his ear drops and said that he has the drops at home and doesn't want to use the hospital version because they're \"too oily.\" Pt also questioned whether he needs to take his injectable lantus insulin but did take it with some encouragement. BG before breakfast was 169. Pt attended some groups and was selectively social with peers. Spent time pacing in halls listening to headphones. Requested prn gabapentin around lunchtime for pain. Pt continues to be hyperfocused on being discharged from the hospital and is disorganized and suspicious at times. Pt has a dental appointment at 9 am tomorrow and will need a  and 1 or more staff to go with the pt d/t very high elopement risk. Provider, management, and security are all aware of this. No SI/SIB noted or observed. Will continue to monitor.  "

## 2023-05-16 LAB
GLUCOSE BLDC GLUCOMTR-MCNC: 174 MG/DL (ref 70–99)
GLUCOSE BLDC GLUCOMTR-MCNC: 183 MG/DL (ref 70–99)

## 2023-05-16 PROCEDURE — 250N000013 HC RX MED GY IP 250 OP 250 PS 637: Performed by: PSYCHIATRY & NEUROLOGY

## 2023-05-16 PROCEDURE — 99232 SBSQ HOSP IP/OBS MODERATE 35: CPT | Performed by: NURSE PRACTITIONER

## 2023-05-16 PROCEDURE — 124N000002 HC R&B MH UMMC

## 2023-05-16 PROCEDURE — G0177 OPPS/PHP; TRAIN & EDUC SERV: HCPCS

## 2023-05-16 PROCEDURE — 250N000013 HC RX MED GY IP 250 OP 250 PS 637: Performed by: NURSE PRACTITIONER

## 2023-05-16 RX ADMIN — METFORMIN ER 500 MG 2000 MG: 500 TABLET ORAL at 08:13

## 2023-05-16 RX ADMIN — ACETAMINOPHEN 325MG 650 MG: 325 TABLET ORAL at 06:00

## 2023-05-16 RX ADMIN — Medication 250 MG: at 08:13

## 2023-05-16 RX ADMIN — GABAPENTIN 300 MG: 300 CAPSULE ORAL at 06:00

## 2023-05-16 RX ADMIN — IBUPROFEN 600 MG: 600 TABLET ORAL at 07:27

## 2023-05-16 RX ADMIN — Medication 3 MG: at 22:53

## 2023-05-16 RX ADMIN — INSULIN GLARGINE 15 UNITS: 100 INJECTION, SOLUTION SUBCUTANEOUS at 08:17

## 2023-05-16 RX ADMIN — FLUTICASONE PROPIONATE 1 SPRAY: 50 SPRAY, METERED NASAL at 08:20

## 2023-05-16 RX ADMIN — RISPERIDONE 3 MG: 3 TABLET ORAL at 20:52

## 2023-05-16 RX ADMIN — GABAPENTIN 300 MG: 300 CAPSULE ORAL at 15:45

## 2023-05-16 ASSESSMENT — ACTIVITIES OF DAILY LIVING (ADL)
ADLS_ACUITY_SCORE: 18
ORAL_HYGIENE: INDEPENDENT
HYGIENE/GROOMING: INDEPENDENT
ADLS_ACUITY_SCORE: 18
DRESS: INDEPENDENT
LAUNDRY: WITH SUPERVISION
ADLS_ACUITY_SCORE: 18
DRESS: SCRUBS (BEHAVIORAL HEALTH);INDEPENDENT
ORAL_HYGIENE: INDEPENDENT
ADLS_ACUITY_SCORE: 18
LAUNDRY: WITH SUPERVISION
HYGIENE/GROOMING: INDEPENDENT
ADLS_ACUITY_SCORE: 18

## 2023-05-16 NOTE — PLAN OF CARE
"Occupational Therapy Group Note:       05/16/23 1656   Group Therapy Session   Group Attendance attended group session   Time Session Began 1315   Time Session Ended 1410   Total Time (minutes) 55   Total # Attendees 5   Group Type psychoeducation   Group Topic Covered balanced lifestyle;emotions/expression;structured socialization   Group Session Detail OT Topic Group: Bridge to Self-Confidence   Patient Response/Contribution confronted peers appropriately;cooperative with task;listened actively   Patient Participation Detail Patient attended and actively participated in an OT facilitated life management group. Patient appeared comfortable answering question cards related to problem solving, identifying self-positives/strengths, and feelings expression. This specific activity promotes the ongoing development and refinement of life management skills and self-confidence. Patient was willing to engage in activity with minimal encouragement. Patient demonstrated some difficulty with thinking of responses to game prompts; often benefiting from examples or ideas from writer and/or peers. Patient was able to identify that he is \"kind.\" Patient also disclosed enjoyment of running and shared a past experience regarding this leisure activity with group. Affect: blunted. Mood: calm, cooperative.            "

## 2023-05-16 NOTE — PLAN OF CARE
Occupational Therapy Group Note:       05/16/23 1522   Group Therapy Session   Group Attendance attended group session   Time Session Began 1015   Time Session Ended 1100   Total Time (minutes) 25 (no charge)   Total # Attendees 5   Group Type recreation   Group Topic Covered leisure exploration/use of leisure time;structured socialization   Group Session Detail OT Leisure Group: Italo Lockett   Patient Response/Contribution confronted peers appropriately;cooperative with task;listened actively;offered helpful suggestions to peers   Patient Participation Detail Patient engaged in a leisure activity with a visuospatial and cognitive component in order to promote: problem solving skills, improve attention, emphasize new learning, exercise cognitive skills, and foster leisure and relaxation. Patient entered group late; despite his request to engage in this specific activity. Patient was able to recall directions of game from previous date with minimal need for clarification/guidelines. Patient continued to offer unprompted recommendations to peers; sometimes recommendations were not accurate. Patient seems to get impatient at times when waiting for peers to complete their turn; although, was able to control emotions appropriately. Affect: blunted. Mood: calm, cooperative, engaged.

## 2023-05-16 NOTE — PROGRESS NOTES
05/15/23 2100   Group Therapy Session   Group Attendance attended group session   Time Session Began 2000   Time Session Ended 2045   Total Time (minutes) 45   Total # Attendees 6   Group Type recreation   Group Topic Covered leisure exploration/use of leisure time   Group Session Detail TR leisure group   Patient Response/Contribution cooperative with task   Patient Participation Detail Pt attended the structured Therapeutic Recreation group, participating in a group activity. Pt participated in group discussion, leisure participation, and social engagement to gain self-esteem, manage behaviors, improve social skills, decrease isolation, and reduce anxiety/depression.   Pt remained mostly focused and engaged throughout group activity, but seemed to get distracted easily on occasion.  Pt was contributing to the clues and descriptions throughout the activity. Pt was active in giving clues, but often repeated the same things over Pt seemed restless, throughout the group pt constantly walked around the room.

## 2023-05-16 NOTE — PLAN OF CARE
"Appointment   CM appointment 5/17 @10AM  Dental appointment 5/15 @ 9AM    Tasks Complete:    Chart review     Team meeting     PT approached [facility] and asked \" what is my diagnosis?\" [facility] provided psycho education surrounding pt diagnosis. Pt appeared receptive. Pt inquired about benefits and programs that he qualifies for due to his diagnosis. CTC explained pts insurance and programs that he qualifies for. Pt inquire about case  Management meeting tomorrow and services they provided. [facility] explained CM services and noted that it is a voluntary service once pt is is no longer on commitment. Pt receptive.     Current Symptoms include the following: See RN note     Addressed patient needs/concerns: See above intervention    Discharge Plan or Goal   Plan  TBD likely discharge to an IRTS     Commitment  Commited and Jarvised 05/02/2023-11/02/2023  [id number]    Care Team   No outpatient care team   Father - Gokul Mccoy ([phone])  Civil commitment - áFtima Nunez- [phone]     Barriers to Discharge   Ongoing stabilization  Committed and Jarvised      Referral Status  PT would benefit from  such as case management, psychiatry, OP programming. No referrals have been made as of yet.      Legal Status  Committed and Jarvised        "

## 2023-05-16 NOTE — PLAN OF CARE
"Occupational Therapy Group Note:       05/16/23 1620   Group Therapy Session   Group Attendance attended group session   Time Session Began 1115   Time Session Ended 1200   Total Time (minutes) 25 (no charge)   Total # Attendees 5   Group Type task skill   Group Topic Covered coping skills/lifestyle management;emotions/expression;structured socialization   Group Session Detail OT Modified Clinic Group: Water-color or Window Clings   Patient Response/Contribution confronted peers appropriately;listened actively;refused to participate   Patient Participation Detail Patient inquired about group dynamic; was receptive of information provided; yet, patient refused to participate. Patient reported, \"I am not good at art.\" When peers and writer attempted to encourage and involve patient in activities; patient politely refused. Patient declined other artistic or cognitive options offered. Patient reported desire to just observe. Patient was complimentary of others artwork in group. Patient initiated conversation with peers and was socially active. Patient reported the reason he does not enjoy these particular artistic activities is because, \"they are too feminine.\" Affect: blunted. Mood: calm, pleasant.         "

## 2023-05-16 NOTE — PROGRESS NOTES
"Pt approached staff to request PRN Ibuprofen at 7:20 am and stated that Tylenol didn't help much.  Staff talked with Pt about the upcoming dental appointment and asked whether he would be compliant vs having thoughts to elope.  Pt stated that he won't try to run.  \"I'm too close to going home.\"  He then commented that if he did try to elope it would only set him back more.    "

## 2023-05-16 NOTE — PLAN OF CARE
"Problem: Psychotic Signs/Symptoms  Goal: Improved Behavioral Control (Psychotic Signs/Symptoms)  Outcome: Progressing     Pt was visible in milieu, minimally social with peers. Affect was blunted, restricted. Mood was anxious, tense at times. Pt had a dental appointment in a different building on the hospital campus and was accompanied by 2 behavioral staff and 1 . Pt initially refused to go through with the procedure but staff were able to convince him to stay for the whole dental appointment. He did not try to elope from the hospital and returned to the unit without incident. Pt ate meals and was compliant with medications. His BG before breakfast was 183 and pt was concerned about why it was so high when he hadn't eaten anything yet. Pt asked this RN if his walking was causing his BG to be higher, this RN explained that exercise usually lowers BG's. Pt said \"No, I think it's the exercise that's causing it to go higher.\" Pt continues to have very limited insight into his MH and frequently says that he shouldn't be forced to take any medications because he doesn't need them and that he should be discharged from the hospital because he doesn't need to be here. No SI/SIB noted or observed. Will continue to monitor.  "

## 2023-05-16 NOTE — PLAN OF CARE
"Pt appeared to be sleeping early in the night.  He was noted to be awake a few times but returned to rest in bed without issue.  Pt is awake and approached the desk at 5:45 am to look at the unit clock.  He asked if he is still scheduled to see a dentist today and whether he should eat breakfast.  Staff informed that he should still be able to eat his meal this morning but if he prefers we can hold his tray for him until he returns.  Pt then asked for PRN Gabapentin for back pain.  \"I think it is how I am sleeping\".  Approximately 5 hours of sleep is recorded tonight.   Will continue to monitor and assess.   "

## 2023-05-16 NOTE — PLAN OF CARE
Problem: Psychotic Signs/Symptoms  Goal: Improved Behavioral Control (Psychotic Signs/Symptoms)  Outcome: Progressing  Goal: Improved Mood Symptoms  Outcome: Progressing     Problem: Psychotic Signs/Symptoms  Goal: Improved Mood Symptoms  Outcome: Progressing  Pt is visible in the milieu. He is social and appropriately engaged with staff and peers. Pt is pleasant but kind of restless unable to stay on one place for longer period of time. He reported back pain 6/10; request and given PRN Gabapentin which he said was effective.He denies anxiety, depression, SI, HI, AH. Pt inquired about discharge plans. He says he's been here too long and is tired of being in the hospital. Pt is compliant with BG check, it was 174. He says he is exercising to lower his BG.  His mood and affect brightened up after his dad visited. He actively participated in group. Pt continues to be compliant with Jarvised medication. He declined Flonase. VSS. Adequate intake.  Received PRN melatonin per request.

## 2023-05-17 LAB — GLUCOSE BLDC GLUCOMTR-MCNC: 183 MG/DL (ref 70–99)

## 2023-05-17 PROCEDURE — G0177 OPPS/PHP; TRAIN & EDUC SERV: HCPCS

## 2023-05-17 PROCEDURE — 250N000013 HC RX MED GY IP 250 OP 250 PS 637: Performed by: NURSE PRACTITIONER

## 2023-05-17 PROCEDURE — 124N000002 HC R&B MH UMMC

## 2023-05-17 PROCEDURE — 99232 SBSQ HOSP IP/OBS MODERATE 35: CPT | Performed by: NURSE PRACTITIONER

## 2023-05-17 RX ADMIN — INSULIN GLARGINE 15 UNITS: 100 INJECTION, SOLUTION SUBCUTANEOUS at 08:09

## 2023-05-17 RX ADMIN — Medication 3 MG: at 23:00

## 2023-05-17 RX ADMIN — METFORMIN ER 500 MG 2000 MG: 500 TABLET ORAL at 08:11

## 2023-05-17 RX ADMIN — IBUPROFEN 600 MG: 600 TABLET ORAL at 06:29

## 2023-05-17 RX ADMIN — RISPERIDONE 3 MG: 3 TABLET ORAL at 20:45

## 2023-05-17 ASSESSMENT — ACTIVITIES OF DAILY LIVING (ADL)
ORAL_HYGIENE: INDEPENDENT
ADLS_ACUITY_SCORE: 18
ADLS_ACUITY_SCORE: 18
ORAL_HYGIENE: INDEPENDENT
LAUNDRY: WITH SUPERVISION
ADLS_ACUITY_SCORE: 18
HYGIENE/GROOMING: INDEPENDENT
DRESS: SCRUBS (BEHAVIORAL HEALTH);INDEPENDENT
ADLS_ACUITY_SCORE: 18
DRESS: SCRUBS (BEHAVIORAL HEALTH);INDEPENDENT
ADLS_ACUITY_SCORE: 18
HYGIENE/GROOMING: INDEPENDENT
ADLS_ACUITY_SCORE: 18

## 2023-05-17 NOTE — PROGRESS NOTES
Behavioral Health  Note   Behavioral Health  Spirituality Group Note     Unit 4a    Name: Alexander Mccoy    YOB: 1985   MRN: [id number]    Age: 38 year old     Patient attended -led group, which included discussion of spirituality, coping with illness and building resilience.   Patient attended group for [facility] - spirituality groups are not billed.   patient demonstrated an appreciation of topic's application for their personal circumstances.     [facility]  Staff    Page [phone]

## 2023-05-17 NOTE — PLAN OF CARE
Appointment   CM appointment 5/17 @10AM      Tasks Complete:    Chart review     Team meeting     CM visited pt on the unit today. CM notes the visit went well. He said pt inquired if he could work with pt after the commitment expires and appeared goal oriented. He made paranoid statements through out the meeting and pt appeared resistant to IRTS. CM is willing to explore the option of pt returning home with his parents with CADI or nursing from the county until CADI is approved if the care team agrees. CTC and CM discussed the risks involved in pt returning home. CM agrees with risks and notes that he will speak with pt parents . In addition, according to CM the pt's parents lack insight to pt illness. Nevertheless., the CM will speak with parents about potential discharge plan.  o Cm will assist pt with getting SMRT'd     Current Symptoms include the following: See RN note     Addressed patient needs/concerns: See above intervention    Discharge Plan or Goal   Plan  TBD likely discharge to an IRTS     Commitment  Commited and Jarvised 05/02/2023-11/02/2023  [id number]    Care Team   No outpatient care team   Father - Gokul Mccoy ([phone])  Civil commitment - Fátima Nunez- [phone]     Barriers to Discharge   Ongoing stabilization  Committed and Jarvised      Referral Status  PT would benefit from  such as case management, psychiatry, OP programming. No referrals have been made as of yet.      Legal Status  Committed and Jarvised

## 2023-05-17 NOTE — PLAN OF CARE
"  Problem: Psychotic Signs/Symptoms  Goal: Improved Behavioral Control (Psychotic Signs/Symptoms)  Outcome: Progressing   Goal Outcome Evaluation:    Plan of Care Reviewed With: patient      \"I'm better.\" Patient states thoughts are more clear and is able to focus better. Denies depression and anxiety.  attends most the groups and is social with peers. 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  wants to drive truck so he can see the country. \"I got mad at the police and they brought me in here.\" Patient asked if he could be taught how to give himself insulin.            "

## 2023-05-17 NOTE — PLAN OF CARE
"Problem: Psychotic Signs/Symptoms  Goal: Improved Behavioral Control (Psychotic Signs/Symptoms)  Outcome: Progressing  Goal: Increased Participation and Engagement (Psychotic Signs/Symptoms)  Outcome: Progressing  Goal: Improved Mood Symptoms  Outcome: Progressing     Problem: Psychotic Signs/Symptoms  Goal: Increased Participation and Engagement (Psychotic Signs/Symptoms)  Outcome: Progressing     Problem: Psychotic Signs/Symptoms  Goal: Improved Mood Symptoms  Outcome: Progressing    Plan of Care Reviewed With: patient      Pt has a bright affect and calm mood. He spends majority of the shift in the milieu. Appropriately social with peers. Pt denies anxiety, depression, SI, HIO, AH. He denies pain. Pt says he will cooperate with whatever the court wants and was smiling while telling staff. He was commended for positive behavior. He refused BG check saying \"if Kaylie will say I should do it then I will\". He is medication complaint. He attended group.     2300: Receive PRN Melatonin 3 mg per request. He went to his room after receiving the medication.  "

## 2023-05-17 NOTE — CONSULTS
Consulted by Savannah Pope to run a test claim for Freestyle Candi.    Patient has pharmacy benefits through [employer] (pre-paid medical assistance plan). Per insurance, the following are covered and preferred under the patient's plans:       Freestyle Candi 2 reader/sensors - $3/$3       Please feel free to contact me with any other test claims, prior authorizations, or insurance questions regarding outpatient medications.     Thanks!      Rocio Eisenberg [facility] Discharge Pharmacy Liaison  Pronouns: She/Her/Hers    [facility]  [facility]  [address] [place] [address] [address] [id number]   [email or web address]  [email or web address]   Phone: [phone]  Pager: [phone]  Fax: [phone]

## 2023-05-17 NOTE — PROGRESS NOTES
[facility],  Psychiatric Progress Note      Impression:     Alexander Mccoy is a 38-year-old male admitted to [address] on 4/22/2023.  He was admitted on a court hold from the [facility] ER due to psychosis.  He stated that he went to a police station because he believed undercover governmental agents were tracking him.  He said he no longer has a job because the government agents had infiltrated through his phone and computer.  He became involved in an altercation with the  at the [facility].  UTOX was negative and he denies substance abuse.  He was not taking any psychotropic medications prior to admission.  While in the ER, he was intermittently agitated.  He called 911.  During his time in the ER, a petition for commitment MI with [facility] was filed in [facility], and he was placed on a court hold.  He has type 2 diabetes.  HbA1c was 10.9.  He reports taking Metformin XR prior to admission.  While in the ER, he was inconsistently adherent to insulin and BG checks.  During the admission assessment, he reports rarely checking his blood glucose and states that his BG never exceeds 200, however BGs have often been over 300 since he was in the ER, which he relates to stress.  He says if he were not in the hospital, they would be lower.   He was not taking any psychotropic medications prior to admission.  Abilify was initiated in the ER, but he refused the second dose.  He states he will not take psychotropic medications nor consent to labs or insulin while hospitalized.  Throughout the conversation, he rarely responded directly to provider's inquiries and instead made comments about being tracked and the injustices of being hospitalized.  Since admission, Abilify was increased, and he refused 3/4 doses.  Abilify was then discontinued and replaced with Haldol; he took 2 doses and then refused the rest until Kristofer was  enacted.  Due to his complaints of side effects including drowsiness, Haldol was discontinued and replaced with Risperdal on 5/11.  PRNs of Melatonin, Hydroxyzine and Zyprexa were initiated.  He initially refused BG checks and insulin but has been adhering since the evening of 4/22 onward.   He is committed and Jarvised as of 5/2.  Upon receiving court paperwork, he kicked and punched the exit door and a behavioral code was called.  He subsequently was agreeable to taking medications and going to his room, so he was not placed in seclusion.  On 5/10 he pounded his fist on the desk, stating he wanted to discharge.  Symptoms have been improving since the change from Haldol to Risperdal.  Psychotic symptoms are slightly less prevalent.  He has been calmer.  Sleep is improved.           Diagnoses:     Unspecified psychosis, likely paranoid schizophrenia  Type 2 diabetes, insulin dependent  Low back pain with left-sided sciatica  Mobility on left lower molar         Plan:     Medications: Continue Risperdal 3 mg at HS with a back up of IM Haldol 5 mg per [facility]er.  Continue PRNs of Melatonin, Hydroxyzine and Zyprexa.       He is committed MI with [facility] (Abilify, Risperdal, Haldol, Zyprexa) in [facility].     Internal medicine signed off on diabetes management on 5/10.  He reports using FreeStyle Candi at home for blood glucose monitoring; on 5/17 ordered a pharmacy liaison consult to determine if this is covered.  Plan for diabetes education consult.    First episode psychosis labs were unremarkable.     He lives with his parents.  Likely plan for IRTS placement.  He does not have outpatient providers. Plan for ENT referral for tinnitus.   will meet with him 5/17 at 10 AM.          Attestation:  Patient has been seen and evaluated by me, Savannah Pope, CLARA CNP  The patient was counseled on nature of illness and treatment plan/options  Care was coordinated with treatment team  Total time > 35  "minutes           Interim History:     The patient's care was discussed with the treatment team and chart notes were reviewed.  Pt was documented as sleeping 5 hours during the overnight shift.  Pt reports that yesterday the dentist informed him that his left lower molar will eventually fall out and recommended that it be extracted in clinic.  He declined.  He says he wants to be able to have his tooth for as long as possible.  He has been attending groups, playing board games and reading.  He reports good concentration during all tasks, however he continues to have difficulty processing information.  For example, when asked about his discharge plan, he accurately identified IRTS placement but claimed that no members of the treatment team had discussed length of hospitalization.  However, provider has discussed this with him at least twice within the past couple weeks, including on 5/15.  Pt's BGs were 183 and 174 yesterday.  Pt continues to believe it is not necessary to take Lantus after discharge.  Provider gave him extensive education regarding diabetes.  He stated that his BG was nearly 500 in the ER because he had been consuming a lot of juice for 1 week prior to admission.  Discussed that HbA1c was 10.9 which would suggest chronically elevated BG for the past 3 months.  He appeared to have some difficulty processing this.  States he doesn't like blood glucose checks and very much wants to resume his Freestyle Candi upon discharge.  Entered pharmacy liaison consult order to determine insurance coverage.  Informed him he will need a diabetes educator consult prior to discharge.  Pt reports feeling frustrated with being hospitalized.  He says that he feels bored.  He reports sleeping well.  He denies suicidal ideation.  States he has not been thinking about \"undercovers.\"  He said, \"I don't think about them anymore.  It was just a misunderstanding.\"           Medications:     Current Facility-Administered " Medications   Medication     acetaminophen (TYLENOL) tablet 650 mg     alum & mag hydroxide-simethicone (MAALOX) suspension 30 mL     benztropine (COGENTIN) tablet 1 mg     glucose gel 15-30 g    Or     dextrose 50 % injection 25-50 mL    Or     glucagon injection 1 mg     fluticasone (FLONASE) 50 MCG/ACT spray 1 spray     gabapentin (NEURONTIN) capsule 300 mg     risperiDONE (risperDAL) tablet 3 mg    Or     haloperidol lactate (HALDOL) injection 5 mg     hydrOXYzine (ATARAX) tablet 25 mg     ibuprofen (ADVIL/MOTRIN) tablet 600 mg     insulin glargine (LANTUS PEN) injection 15 Units     melatonin tablet 3 mg     menthol (Topical Analgesic) 2.5% (BENGAY VANISHING SCENT) 2.5 % topical gel     metFORMIN (GLUCOPHAGE XR) 24 hr tablet 2,000 mg     OLANZapine (zyPREXA) tablet 10 mg    Or     OLANZapine (zyPREXA) injection 10 mg     senna-docusate (SENOKOT-S/PERICOLACE) 8.6-50 MG per tablet 1 tablet     sodium chloride (OCEAN) 0.65 % nasal spray 1 spray     vitamin C (ASCORBIC ACID) tablet 250 mg             Allergies:   No Known Allergies         Psychiatric Examination:     /76 (BP Location: Left arm, Patient Position: Sitting, Cuff Size: Adult Large)   Pulse 87   Temp 97.2  F (36.2  C)   Resp 18   Wt 89.8 kg (198 lb)   SpO2 98%   BMI 32.95 kg/m      Appearance:  awake, alert, disheveled, dressed in scrubs  Attitude:  cooperative  Eye Contact:  fair  Mood:  frustrated related to being hospitalized  Affect:  normal range  Speech:  clear, coherent, normal volume   Psychomotor Behavior:  no evidence of tardive dyskinesia, dystonia, or tics  Thought Process:  somewhat disorganized, illogical  Associations:  no loose associations  Thought Content:  denies suicidal and homicidal ideation, paranoid delusions are present but less pervasive compared to admission, denies hallucinations  Insight:  limited  Judgment:  limited  Oriented to:  date, time, person, and place  Attention Span and Concentration:  fair  Recent  and Remote Memory:  fair  Language:  intact, fluent English  Fund of Knowledge:  appropriate  Muscle Strength and Tone:  normal  Gait and Station:  normal          Labs:     Recent Results (from the past 24 hour(s))   Glucose by meter    Collection Time: 05/16/23  3:50 PM   Result Value Ref Range    GLUCOSE BY METER POCT 174 (H) 70 - 99 mg/dL   Glucose by meter    Collection Time: 05/17/23  8:04 AM   Result Value Ref Range    GLUCOSE BY METER POCT 183 (H) 70 - 99 mg/dL

## 2023-05-17 NOTE — PROGRESS NOTES
05/16/23 2113   Group Therapy Session   Group Attendance attended group session   Time Session Began 2000   Time Session Ended 2050   Total Time (minutes) 50   Total # Attendees 4   Group Type recreation   Group Session Detail Open recreation ping pong, bocce ball, bean bag toss, crossword puzzles, word searches, simple art for building autonomy via having a choice in which activities to participate in, concentration, large motor, FM skills, coordination, sequencing, formulating strategy, coping, healthy leisure, mood stabilization, grounding, reality-based activity, expanding social skills, frustration tolerance, motivation and an opportunity to experience success.   Patient Participation Detail Pt was pleasant and engaged in the ping pong matches with his peers.  Pt initially argued points erroneously and tried to float ideas that would benefit his team.  As group progressed and pt had a better understanding of the scoring he ceased this behavior.  Just as pt was also more readily able to accept when he had a bad return or missed the ball.  Pt was blaming his racket, the equipment, the table and sundry other things at the beginning of the group session.  By the end of the session he no longer made a bunch of excuses and was able to accept if not every volley went in his favor.  Pt's affect brightened and pt appeared to benefit from watching a peer with a physical disability play regardless of their barriers.

## 2023-05-17 NOTE — CARE PLAN
Occupational Therapy Group Note:     05/17/23 1000   Group Therapy Session   Group Attendance attended group session   Time Session Began 1020   Time Session Ended 1200   Total Time (minutes) 60   Total # Attendees 6   Group Type task skill   Group Topic Covered cognitive activities;coping skills/lifestyle management   Group Session Detail OT clinic   Patient Response/Contribution cooperative with task   Patient Participation Detail Pt actively participated in occupational therapy clinic to facilitate coping skill exploration, creative expression within personally meaningful activities, and clinical observation of social, cognitive, and kinesthetic performance skills. Pt response: Minimal assistance needed to initiate, gather materials, sequence, and adjust to workspace demands as needed. Demonstrated fair attention to task and visual scanning for selected goal-directed task. Able to ask for assistance as needed, and socialized with peers and staff. Stated he did not have a strategy for his task, and was receptive to a strategic suggestion from writer. Apologized several times for missing the beginning of group, explaining that he was meeting with his CM, and wanted to ensure that he still got credit for group. Calm and cooperative.

## 2023-05-17 NOTE — PLAN OF CARE
Goal Outcome Evaluation:       Pt was a wake on and off throughout the shift.  He ate one snack.  No prn medication administered this shift.  He slept 5 hours.

## 2023-05-18 LAB
GLUCOSE BLDC GLUCOMTR-MCNC: 164 MG/DL (ref 70–99)
GLUCOSE BLDC GLUCOMTR-MCNC: 188 MG/DL (ref 70–99)

## 2023-05-18 PROCEDURE — G0177 OPPS/PHP; TRAIN & EDUC SERV: HCPCS

## 2023-05-18 PROCEDURE — 250N000012 HC RX MED GY IP 250 OP 636 PS 637

## 2023-05-18 PROCEDURE — 250N000013 HC RX MED GY IP 250 OP 250 PS 637: Performed by: NURSE PRACTITIONER

## 2023-05-18 PROCEDURE — 99232 SBSQ HOSP IP/OBS MODERATE 35: CPT | Performed by: NURSE PRACTITIONER

## 2023-05-18 PROCEDURE — 124N000002 HC R&B MH UMMC

## 2023-05-18 PROCEDURE — H2032 ACTIVITY THERAPY, PER 15 MIN: HCPCS

## 2023-05-18 RX ORDER — RISPERIDONE 4 MG/1
4 TABLET ORAL EVERY EVENING
Status: DISCONTINUED | OUTPATIENT
Start: 2023-05-18 | End: 2023-06-06 | Stop reason: HOSPADM

## 2023-05-18 RX ORDER — HALOPERIDOL 5 MG/ML
5 INJECTION INTRAMUSCULAR EVERY EVENING
Status: DISCONTINUED | OUTPATIENT
Start: 2023-05-18 | End: 2023-06-06 | Stop reason: HOSPADM

## 2023-05-18 RX ADMIN — GABAPENTIN 300 MG: 300 CAPSULE ORAL at 17:45

## 2023-05-18 RX ADMIN — RISPERIDONE 4 MG: 4 TABLET ORAL at 20:59

## 2023-05-18 RX ADMIN — FLUTICASONE PROPIONATE 1 SPRAY: 50 SPRAY, METERED NASAL at 08:20

## 2023-05-18 RX ADMIN — INSULIN GLARGINE 15 UNITS: 100 INJECTION, SOLUTION SUBCUTANEOUS at 08:19

## 2023-05-18 RX ADMIN — Medication 250 MG: at 08:08

## 2023-05-18 RX ADMIN — METFORMIN ER 500 MG 2000 MG: 500 TABLET ORAL at 08:08

## 2023-05-18 ASSESSMENT — ACTIVITIES OF DAILY LIVING (ADL)
HYGIENE/GROOMING: INDEPENDENT
ORAL_HYGIENE: INDEPENDENT
ADLS_ACUITY_SCORE: 18
ADLS_ACUITY_SCORE: 18
LAUNDRY: UNABLE TO COMPLETE
ADLS_ACUITY_SCORE: 18
ORAL_HYGIENE: INDEPENDENT
ADLS_ACUITY_SCORE: 18
ADLS_ACUITY_SCORE: 18
DRESS: SCRUBS (BEHAVIORAL HEALTH)
ADLS_ACUITY_SCORE: 18
ADLS_ACUITY_SCORE: 18
DRESS: SCRUBS (BEHAVIORAL HEALTH);INDEPENDENT
HYGIENE/GROOMING: INDEPENDENT
ADLS_ACUITY_SCORE: 18

## 2023-05-18 NOTE — CARE PLAN
Occupational Therapy Group Note:     05/18/23 1300   Group Therapy Session   Group Attendance attended group session   Time Session Began 1015   Time Session Ended 1115   Total Time (minutes) 60   Total # Attendees 5   Group Type task skill   Group Topic Covered cognitive activities;coping skills/lifestyle management   Group Session Detail OT clinic   Patient Response/Contribution cooperative with task   Patient Participation Detail Pt actively participated in occupational therapy clinic to facilitate coping skill exploration, creative expression within personally meaningful activities, and clinical observation of social, cognitive, and kinesthetic performance skills. Pt response: Independent to initiate, gather materials, sequence, and adjust to workspace demands as needed. Demonstrated good attention to task (about x30 minutes with brief breaks) and visual scanning for selected goal-directed task.  Able to ask for assistance as needed, and socialized with peers and staff. Discussed his goal to better manage his diabetes while he is in the hospital. Also shared that he hopes to go fishing this summer. Appears to have positive rapport with male peer JAN. Calm and cooperative.

## 2023-05-18 NOTE — CARE PLAN
"   05/17/23 2100   Group Therapy Session   Group Attendance attended group session   Time Session Began 2000   Time Session Ended 2055   Total Time (minutes) 55   Total # Attendees 4   Group Type task skill   Group Topic Covered coping skills/lifestyle management   Patient Response/Contribution cooperative with task   Patient Participation Detail See Note     Pt actively participated in occupational therapy clinic to facilitate coping skill exploration, creative expression within personally meaningful activities, and clinical observation of social, cognitive, and kinesthetic performance skills. Pt response: Independent to initiate, gather materials, sequence, and adjust to workspace demands as needed. Demonstrated fair focus, planning, and problem solving for selected task. Able to ask for assistance as needed, and social with peers and staff. Pt did need some verbal redirection as he wanted to only discuss that he is here against his will and only because the police brought him here. Pt continues to maintain that he does not have any mental health issues and lucita he doesn't not want to be taking the medications he currently is \"being forced\" to take.  Pt was redirectable and overall pleasant. Pt will continue to be encouraged to attend groups for further asssesssment and to address goals identified on plan of care.    "

## 2023-05-18 NOTE — PLAN OF CARE
"  Problem: Psychotic Signs/Symptoms  Goal: Improved Behavioral Control (Psychotic Signs/Symptoms)  Outcome: Progressing  Goal: Increased Participation and Engagement (Psychotic Signs/Symptoms)  Outcome: Progressing     Problem: Psychotic Signs/Symptoms  Goal: Increased Participation and Engagement (Psychotic Signs/Symptoms)  Outcome: Progressing     Problem: Anxiety Signs/Symptoms  Goal: Optimized Energy Level (Anxiety Signs/Symptoms)  Outcome: Progressing  Pt approached RN for BG saying \"I have to do this if I want to leave this place\". . He denies all psych symptoms. He did not report back pain. He remains visible and social with peers. He listens to headphones. He was observed talking on the phone several times. After dinner, he asked and received Gabapentin for \"spinal pain 5/10.\" He says it's beneficial. Pt is medication compliant. He went to group. Room is blocked due to paranoia.    Pt's mom called asking to have a care conference with the team regarding Alexander's discharge plans. She asks to have an . She says that Alexander called them informing his parents that he might be coming home. The mom says they want Alexander to go to a treatment facility to be sure that he will continue to take his medications. She says, both parents work and  she worries that Alexander will not be medication compliant after discharge. She asks writer to not tell Alexander of their wishes. She reports noticing an improvement while talking to him on the phone. She says, Alexander used to accused her of putting poison in the food she cooks. The mom expressed gratitude for the care Alexander is receiving. Sticky note left for provider.    "

## 2023-05-18 NOTE — PLAN OF CARE
Tasks Complete:    Chart review     Team meeting       Current Symptoms include the following: See RN note     Addressed patient needs/concerns: See above intervention    Discharge Plan or Goal   Plan  TBD likely discharge to an IRTS     Commitment  Commited and Jarvised 05/02/2023-11/02/2023  [id number]    Care Team   No outpatient care team   Father - Gokul Mccoy ([phone])  Civil commitment - Fátima Nunez- [phone]     Barriers to Discharge   Ongoing stabilization  Committed and Jarvised      Referral Status  PT would benefit from  such as case management, psychiatry, OP programming. No referrals have been made as of yet.      Legal Status  Committed and Jarvised

## 2023-05-18 NOTE — PROGRESS NOTES
[facility],  Psychiatric Progress Note      Impression:     Alexander Mccoy is a 38-year-old male admitted to [address] on 4/22/2023.  He was admitted on a court hold from the [facility] ER due to psychosis.  He stated that he went to a police station because he believed undercover governmental agents were tracking him.  He said he no longer has a job because the government agents had infiltrated through his phone and computer.  He became involved in an altercation with the  at the [facility].  UTOX was negative and he denies substance abuse.  He was not taking any psychotropic medications prior to admission.  While in the ER, he was intermittently agitated.  He called 911.  During his time in the ER, a petition for commitment MI with [facility] was filed in [facility], and he was placed on a court hold.  He has type 2 diabetes.  HbA1c was 10.9.  He reports taking Metformin XR prior to admission.  While in the ER, he was inconsistently adherent to insulin and BG checks.  During the admission assessment, he reports rarely checking his blood glucose and states that his BG never exceeds 200, however BGs have often been over 300 since he was in the ER, which he relates to stress.  He says if he were not in the hospital, they would be lower.   He was not taking any psychotropic medications prior to admission.  Abilify was initiated in the ER, but he refused the second dose.  He states he will not take psychotropic medications nor consent to labs or insulin while hospitalized.  Throughout the conversation, he rarely responded directly to provider's inquiries and instead made comments about being tracked and the injustices of being hospitalized.  Since admission, Abilify was increased, and he refused 3/4 doses.  Abilify was then discontinued and replaced with Haldol; he took 2 doses and then refused the rest until Kristofer was  enacted.  Due to his complaints of side effects including drowsiness, Haldol was discontinued and replaced with Risperdal on 5/11.  PRNs of Melatonin, Hydroxyzine and Zyprexa were initiated.  He initially refused BG checks and insulin but has been adhering since the evening of 4/22 onward.   He is committed and Jarvised as of 5/2.  Upon receiving court paperwork, he kicked and punched the exit door and a behavioral code was called.  He subsequently was agreeable to taking medications and going to his room, so he was not placed in seclusion.  On 5/10 he pounded his fist on the desk, stating he wanted to discharge.  Symptoms have been improving since the change from Haldol to Risperdal.  Psychotic symptoms are slightly less prevalent.  He has been calmer.  Sleep is improved.           Diagnoses:     Unspecified psychosis, likely paranoid schizophrenia  Type 2 diabetes, insulin dependent  Low back pain with left-sided sciatica  Mobility on left lower molar         Plan:     Medications: Increase Risperdal to 4 mg at HS with a back up of IM Haldol 5 mg per [facility]er.  Continue PRNs of Melatonin, Hydroxyzine and Zyprexa.       He is committed MI with [facility]er (Abilify, Risperdal, Haldol, Zyprexa) in [facility].     Internal medicine signed off on diabetes management on 5/10.  He reports using FreeStyle Candi at home for blood glucose monitoring.  Plan for diabetes education consult.    First episode psychosis labs were unremarkable.     He lives with his parents.  Plan for discharge to home with parents if he has added supports in place including medication monitoring.  Otherwise consider IRTS placement.  Plan for ENT referral for tinnitus.         Attestation:  Patient has been seen and evaluated by me, Savannah Pope, APRN CNP  The patient was counseled on nature of illness and treatment plan/options  Care was coordinated with treatment team  Total time > 35 minutes           Interim History:     The patient's  "care was discussed with the treatment team and chart notes were reviewed.  Pt was documented as sleeping 5 hours during the overnight shift.  He took PRNs of Melatonin and Ibuprofen.  He attended groups and was calm with fair focus.  He refused 1 BG check yesterday.  He met with his  who is willing to consider discharge to home with added supports.  Discussed that if he does not take PO Risperdal the next plan would be for Risperdal Consta, which he is motivated to avoid.  Pt was disorganized when discussing BG monitoring.  He asked if he could have both Freestyle Candi and a glucometer after discharge and had difficulty understanding why this would be redundant.  Pt states he plans to do cardio workouts and avoid high carb foods.  He denies side effects from Risperdal.  Pt did not protest when provider informed him of increase to 4 mg tonight.  When asked about \"undercovers,\" he responded, \"I don't worry about that.  It doesn't matter.\"  He said if he sees anything suspicious such as triple digit license plates after discharge \"It's a coincidence.\"          Medications:     Current Facility-Administered Medications   Medication     acetaminophen (TYLENOL) tablet 650 mg     alum & mag hydroxide-simethicone (MAALOX) suspension 30 mL     benztropine (COGENTIN) tablet 1 mg     glucose gel 15-30 g    Or     dextrose 50 % injection 25-50 mL    Or     glucagon injection 1 mg     fluticasone (FLONASE) 50 MCG/ACT spray 1 spray     gabapentin (NEURONTIN) capsule 300 mg     risperiDONE (risperDAL) tablet 4 mg    Or     haloperidol lactate (HALDOL) injection 5 mg     hydrOXYzine (ATARAX) tablet 25 mg     ibuprofen (ADVIL/MOTRIN) tablet 600 mg     insulin glargine (LANTUS PEN) injection 15 Units     melatonin tablet 3 mg     menthol (Topical Analgesic) 2.5% (BENGAY VANISHING SCENT) 2.5 % topical gel     metFORMIN (GLUCOPHAGE XR) 24 hr tablet 2,000 mg     OLANZapine (zyPREXA) tablet 10 mg    Or     OLANZapine " (zyPREXA) injection 10 mg     senna-docusate (SENOKOT-S/PERICOLACE) 8.6-50 MG per tablet 1 tablet     sodium chloride (OCEAN) 0.65 % nasal spray 1 spray     vitamin C (ASCORBIC ACID) tablet 250 mg             Allergies:   No Known Allergies         Psychiatric Examination:     /77   Pulse 79   Temp (!) 96.6  F (35.9  C) (Temporal)   Resp 18   Wt 89.3 kg (196 lb 14.4 oz)   SpO2 97%   BMI 32.77 kg/m      Appearance:  awake, alert, disheveled, dressed in scrubs  Attitude:  cooperative  Eye Contact:  fair  Mood:  frustrated related to being hospitalized, denies feeling sad, anxious or irritable  Affect:  normal range  Speech:  clear, coherent, normal volume   Psychomotor Behavior:  no evidence of tardive dyskinesia, dystonia, or tics  Thought Process:  somewhat disorganized, illogical  Associations:  no loose associations  Thought Content:  denies suicidal and homicidal ideation, paranoid delusions are present but less pervasive compared to admission, denies hallucinations  Insight:  limited  Judgment:  limited  Oriented to:  date, time, person, and place  Attention Span and Concentration:  fair  Recent and Remote Memory:  some short-term impairment  Language:  intact, fluent English  Fund of Knowledge:  appropriate  Muscle Strength and Tone:  normal  Gait and Station:  normal          Labs:     Recent Results (from the past 24 hour(s))   Glucose by meter    Collection Time: 05/18/23  7:48 AM   Result Value Ref Range    GLUCOSE BY METER POCT 188 (H) 70 - 99 mg/dL

## 2023-05-18 NOTE — PLAN OF CARE
Goal Outcome Evaluation:    Pt was a sleep at the begining of the shift. Pt slept on and oft throughout the shift. Pt did not ask for prm Safety rounds completed. Pt slept for 5.0 hr

## 2023-05-18 NOTE — PLAN OF CARE
"Goal Outcome Evaluation:    Plan of Care Reviewed With: patient      Patient was up at the beginning of the shift, flowsheets indicate he slept for 5 hours and he says he feels well rested.  Did not complain of any pain this shift.  He allowed blood glucose check and insulin to be given without objection.  Denied anxiety, depression and all other mental health symptoms. Patient continues to report; \"I just don't wanna be here\".  Blood glucose was 188 - he consistently asks why his blood sugar is what it is regardless of the result.  Patient is med compliant, refused Flonase which he has been routinely.  Around 1:30pm patient approached the desk appearing agitated and said \"I need a civil defense \".  He was referred to his commitment  but says \"she doesn't handle civil defense\".  Referred to [facility] who provided patient with the number for his civil commitment .  He continued to pace the halls and appear agitated but refused any PRN's.  He did eventually calm down and watch TV in the lounge.  Denies SI, HI, SIBI and hallucinations.  Does not appear to be responding to internal stimuli but thought process continues to be disorganized.  Patient reports feeling safe and agrees to notify staff if he is experiencing any discomforts.  Continues to require no roommate order due to paranoia.  /77   Pulse 79   Temp (!) 96.6  F (35.9  C) (Temporal)   Resp 18   Wt 89.3 kg (196 lb 14.4 oz)   SpO2 97%   BMI 32.77 kg/m               "

## 2023-05-19 LAB
GLUCOSE BLDC GLUCOMTR-MCNC: 148 MG/DL (ref 70–99)
GLUCOSE BLDC GLUCOMTR-MCNC: 152 MG/DL (ref 70–99)

## 2023-05-19 PROCEDURE — 124N000002 HC R&B MH UMMC

## 2023-05-19 PROCEDURE — 99232 SBSQ HOSP IP/OBS MODERATE 35: CPT | Performed by: NURSE PRACTITIONER

## 2023-05-19 PROCEDURE — 250N000013 HC RX MED GY IP 250 OP 250 PS 637: Performed by: PSYCHIATRY & NEUROLOGY

## 2023-05-19 PROCEDURE — 250N000013 HC RX MED GY IP 250 OP 250 PS 637: Performed by: NURSE PRACTITIONER

## 2023-05-19 PROCEDURE — G0177 OPPS/PHP; TRAIN & EDUC SERV: HCPCS

## 2023-05-19 PROCEDURE — 90853 GROUP PSYCHOTHERAPY: CPT

## 2023-05-19 RX ADMIN — RISPERIDONE 4 MG: 4 TABLET ORAL at 20:57

## 2023-05-19 RX ADMIN — METFORMIN ER 500 MG 2000 MG: 500 TABLET ORAL at 08:08

## 2023-05-19 RX ADMIN — INSULIN GLARGINE 15 UNITS: 100 INJECTION, SOLUTION SUBCUTANEOUS at 08:07

## 2023-05-19 RX ADMIN — Medication 250 MG: at 08:10

## 2023-05-19 RX ADMIN — ACETAMINOPHEN 325MG 650 MG: 325 TABLET ORAL at 11:06

## 2023-05-19 RX ADMIN — FLUTICASONE PROPIONATE 1 SPRAY: 50 SPRAY, METERED NASAL at 21:20

## 2023-05-19 ASSESSMENT — ACTIVITIES OF DAILY LIVING (ADL)
ORAL_HYGIENE: INDEPENDENT
ADLS_ACUITY_SCORE: 18
HYGIENE/GROOMING: INDEPENDENT
HYGIENE/GROOMING: INDEPENDENT
ADLS_ACUITY_SCORE: 18
ORAL_HYGIENE: INDEPENDENT
DRESS: SCRUBS (BEHAVIORAL HEALTH)
ADLS_ACUITY_SCORE: 18
ADLS_ACUITY_SCORE: 18
DRESS: SCRUBS (BEHAVIORAL HEALTH);INDEPENDENT
ADLS_ACUITY_SCORE: 18
LAUNDRY: UNABLE TO COMPLETE

## 2023-05-19 NOTE — PROGRESS NOTES
NOC Shift Report    Pt in bed at beginning of shift, breathing quiet and unlabored.Pt slept 6.5 hours. No PRNs given. Will continue to monitor.

## 2023-05-19 NOTE — CARE PLAN
05/19/23 1200   Group Therapy Session   Group Attendance attended group session   Time Session Began 1015   Time Session Ended 1100   Total Time (minutes) 45   Total # Attendees 5   Group Type expressive therapy   Group Topic Covered cognitive activities   Group Session Detail OT Clinic   Patient Response/Contribution cooperative with task   Patient Participation Detail See Note     Pt actively participated in occupational therapy clinic to facilitate coping skill exploration, creative expression within personally meaningful activities, and clinical observation of social, cognitive, and kinesthetic performance skills. Pt response: Independent to initiate, gather materials, sequence, and adjust to workspace demands as needed. Demonstrated fair focus, planning, and problem solving for selected word find task. Able to ask for assistance as needed, and  social with peers and staff. Pt continues to report he is not mentally ill and he does not need to be in the hospital. Pt will continue to be encouraged to attend groups for further asssesssment and to address goals identified on plan of care.

## 2023-05-19 NOTE — PLAN OF CARE
"      Tasks Complete:    Chart review     Team meeting     CTC left vm for CM and followed up with an email regarding moms concerns.  o CTC spoke with CM about moms feedback. CM has not yet talked to mom or dad but will plan to join CTC and mom meeting on Monday morning.     [facility] spoke with pt mom today. Mom is advocating for pt to discharge to an IRTS as pt will not have enough supervision at home. She is concerned that he will not take his medications and manage his diabetes independently. She would like the care team to not disclose her recommendations to avoid damaging their relationships.       [facility] met with pt about IRTS placement. Pt had several questions about IRTS. [facility] showed pt video examples of IRTS facilitates in the community and pt was receptive. [facility] provided pt a hand out of IRTS placements and to select the IRTS he prefers. Pt is open to \" all irts \". He notes \" the more referrals the better\". He noted that he prefers [facility] and would  Like a facility where he can smoke cigarettes.    Current Symptoms include the following: See RN note     Addressed patient needs/concerns: See above intervention    Discharge Plan or Goal   Plan  TBD likely discharge to an IRTS     Commitment  Commited and Jarvised 05/02/2023-11/02/2023  [id number]    Care Team   No outpatient care team   Father - Gokul Mccoy ([phone])  Civil commitment - Fátima Nunez- [phone]     Barriers to Discharge   Ongoing stabilization  Committed and Jarvised      Referral Status  PT would benefit from  such as case management, psychiatry, OP programming. No referrals have been made as of yet.      Legal Status  Committed and Jarvised        "

## 2023-05-19 NOTE — PLAN OF CARE
"Problem: Psychotic Signs/Symptoms  Goal: Improved Behavioral Control (Psychotic Signs/Symptoms)  Outcome: Progressing  Goal: Improved Psychomotor Symptoms (Psychotic Signs/Symptoms)  Outcome: Progressing     Problem: Psychotic Signs/Symptoms  Goal: Improved Psychomotor Symptoms (Psychotic Signs/Symptoms)  Outcome: Progressing    Plan of Care Reviewed With: patient      Pt continues to have no insight into his mental health. He tells writer, \"you don't let me wear my shoes with lace. I don't have mental illness.\" Pt's demeanor is calm and cooperative. Pt at times questioning his care \"why are you wasting my blood\" while writer was doing his BG. . He denies all psych symptoms at time of assessment.  VSS. Eating and drinking adequately. He went to group. He took scheduled Risperdal and declined nasal spray. Pt later asked for the nasal spray.      "

## 2023-05-19 NOTE — PLAN OF CARE
"Goal Outcome Evaluation:     Plan of Care Reviewed With: patient       Patient was up at the beginning of the shift, flowsheets indicate he slept for 6.75 hours and he says he feels well rested.  Complained of back pain 5/10 around 11:00 am - requested and was given Tylenol which he reported to be helpful, reducing pain to 2/10 1 hour after administration.  He allowed blood glucose check and insulin to be given without objection.  Denied anxiety, depression and all other mental health symptoms. Patient continues to report; \"I really don't wanna be here so I'm trying to do what I have to do to get out of here\".  Blood glucose was 152 - he did not ask for an explanation as to why his blood sugar is what it is - which he typically does.  Patient is med compliant, refused Flonase which he has been routinely.  Paced the halls occasionally but did not appear agitated.  He was informed by his CTC that he may be going to an IRTS - see CTC notes.  He asked if he could use his cell phone to play games (which he can at any IRTS) but did not seem otherwise concerned with this plan.  Denies SI, HI, SIBI and hallucinations.  Does not appear to be responding to internal stimuli but thought process continues to be disorganized.  Patient reports feeling safe and agrees to notify staff if he is experiencing any discomforts.  Continues to require no roommate order due to paranoia.  /75   Pulse 91   Temp 97.8  F (36.6  C)   Resp 16   Wt 89.3 kg (196 lb 14.4 oz)   SpO2 98%   BMI 32.77 kg/m      "

## 2023-05-19 NOTE — PROGRESS NOTES
[facility],  Psychiatric Progress Note      Impression:     Alexander Mccoy is a 38-year-old male admitted to [address] on 4/22/2023.  He was admitted on a court hold from the [facility] ER due to psychosis.  He stated that he went to a police station because he believed undercover governmental agents were tracking him.  He said he no longer has a job because the government agents had infiltrated through his phone and computer.  He became involved in an altercation with the  at the [facility].  UTOX was negative and he denies substance abuse.  He was not taking any psychotropic medications prior to admission.  While in the ER, he was intermittently agitated.  He called 911.  During his time in the ER, a petition for commitment MI with [facility] was filed in [facility], and he was placed on a court hold.  He has type 2 diabetes.  HbA1c was 10.9.  He reports taking Metformin XR prior to admission.  While in the ER, he was inconsistently adherent to insulin and BG checks.  During the admission assessment, he reports rarely checking his blood glucose and states that his BG never exceeds 200, however BGs have often been over 300 since he was in the ER, which he relates to stress.  He says if he were not in the hospital, they would be lower.   He was not taking any psychotropic medications prior to admission.  Abilify was initiated in the ER, but he refused the second dose.  He states he will not take psychotropic medications nor consent to labs or insulin while hospitalized.  Throughout the conversation, he rarely responded directly to provider's inquiries and instead made comments about being tracked and the injustices of being hospitalized.  Since admission, Abilify was increased, and he refused 3/4 doses.  Abilify was then discontinued and replaced with Haldol; he took 2 doses and then refused the rest until [facility] was  enacted.  Due to his complaints of side effects including drowsiness, Haldol was discontinued and replaced with Risperdal on 5/11.  PRNs of Melatonin, Hydroxyzine and Zyprexa were initiated.  He initially refused BG checks and insulin but has been adhering since the evening of 4/22 onward.   He is committed and Jarvised as of 5/2.  Upon receiving court paperwork, he kicked and punched the exit door and a behavioral code was called.  He subsequently was agreeable to taking medications and going to his room, so he was not placed in seclusion.  On 5/10 he pounded his fist on the desk, stating he wanted to discharge.  Symptoms have been improving since the change from Haldol to Risperdal.  Psychotic symptoms are slightly less prevalent.  He has been calmer.  Sleep is improved.  He has difficulty processing and retaining information.          Diagnoses:     Unspecified psychosis, likely paranoid schizophrenia  Type 2 diabetes, insulin dependent  Low back pain with left-sided sciatica  Mobility on left lower molar         Plan:     Medications: Continue Risperdal 4 mg at HS with a back up of IM Haldol 5 mg per [facility].  Continue PRNs of Melatonin, Hydroxyzine and Zyprexa.       He is committed MI with [facility] (Abilify, Risperdal, Haldol, Zyprexa) in [facility].     Internal medicine signed off on diabetes management on 5/10.  He reports using FreeStyle Candi at home for blood glucose monitoring.  Plan for diabetes education consult.    First episode psychosis labs were unremarkable.     He lives with his parents but is not welcome to return; his parents do not want him to know this.  Plan for IRTS placement when stable.  He prefers somewhere close to [place] that allows smoking.  Plan for ENT referral for tinnitus.         Attestation:  Patient has been seen and evaluated by me, Savannah Pope, APRN CNP  The patient was counseled on nature of illness and treatment plan/options  Care was coordinated with  "treatment team  Total time > 35 minutes           Interim History:     The patient's care was discussed with the treatment team and chart notes were reviewed.  Pt was documented as sleeping 6.5 hours during the overnight shift.  BGs were 188 and 164.  He attended 2 groups yesterday and was calm, with good focus.  Staff report he was irritable at times.  He refused 1 dose of Flonase.  His parents contacted staff and reported that they have noticed improvements, but that he is not welcome to return home because they do not trust he will take medications or manage diabetes adequately; they do not want him to know that this was their decision.  Provider informed him today that the treatment team agrees that the plan will be for IRTS placement.  Pt became very dysregulated, talking loudly about his belief that he does not have mental illness.  Pt states that he only thought about \"undercovers\" three times in two years.  \"Now I realize it's just a coincidence.\"   He again stated that he might prefer a glucometer over Freestyle Candi in spite of being very vociferous regarding his dislike of needles.  Provider discussed concerns about him processing and retaining information, particularly with regard to diabetes management.  He said he feels \"a little\" drowsy from Risperdal but otherwise denies side effects.  He later approached provider and appeared more accepting of IRTS placement.  He said he would prefer to be near his parents' home in [place], a facility that allows smoking, and preferably his own room.         Medications:     Current Facility-Administered Medications   Medication     acetaminophen (TYLENOL) tablet 650 mg     alum & mag hydroxide-simethicone (MAALOX) suspension 30 mL     benztropine (COGENTIN) tablet 1 mg     glucose gel 15-30 g    Or     dextrose 50 % injection 25-50 mL    Or     glucagon injection 1 mg     fluticasone (FLONASE) 50 MCG/ACT spray 1 spray     gabapentin (NEURONTIN) capsule 300 mg " "    risperiDONE (risperDAL) tablet 4 mg    Or     haloperidol lactate (HALDOL) injection 5 mg     hydrOXYzine (ATARAX) tablet 25 mg     ibuprofen (ADVIL/MOTRIN) tablet 600 mg     insulin glargine (LANTUS PEN) injection 15 Units     melatonin tablet 3 mg     menthol (Topical Analgesic) 2.5% (BENGAY VANISHING SCENT) 2.5 % topical gel     metFORMIN (GLUCOPHAGE XR) 24 hr tablet 2,000 mg     OLANZapine (zyPREXA) tablet 10 mg    Or     OLANZapine (zyPREXA) injection 10 mg     senna-docusate (SENOKOT-S/PERICOLACE) 8.6-50 MG per tablet 1 tablet     sodium chloride (OCEAN) 0.65 % nasal spray 1 spray     vitamin C (ASCORBIC ACID) tablet 250 mg             Allergies:   No Known Allergies         Psychiatric Examination:     /75   Pulse 91   Temp 97.8  F (36.6  C)   Resp 16   Wt 89.3 kg (196 lb 14.4 oz)   SpO2 98%   BMI 32.77 kg/m      Appearance:  awake, alert, disheveled, dressed in scrubs  Attitude:  cooperative  Eye Contact:  fair  Mood:  \"angry\" related to being hospitalized  Affect:  normal range  Speech:  clear, coherent, normal volume   Psychomotor Behavior:  no evidence of tardive dyskinesia, dystonia, or tics  Thought Process:  somewhat disorganized, illogical  Associations:  no loose associations  Thought Content:  denies suicidal and homicidal ideation, paranoid delusions are present but less pervasive compared to admission, denies hallucinations  Insight:  limited  Judgment:  limited  Oriented to:  date, time, person, and place  Attention Span and Concentration:  fair  Recent and Remote Memory:  some short-term impairment  Language:  intact, fluent English  Fund of Knowledge:  appropriate  Muscle Strength and Tone:  normal  Gait and Station:  normal          Labs:     Recent Results (from the past 24 hour(s))   Glucose by meter    Collection Time: 05/18/23  4:55 PM   Result Value Ref Range    GLUCOSE BY METER POCT 164 (H) 70 - 99 mg/dL   Glucose by meter    Collection Time: 05/19/23  7:47 AM   Result " Value Ref Range    GLUCOSE BY METER POCT 152 (H) 70 - 99 mg/dL

## 2023-05-19 NOTE — PROGRESS NOTES
05/18/23 2000   Group Therapy Session   Time Session Began 2000   Time Session Ended 2045   Total Time (minutes) 45   Total # Attendees 6   Group Type expressive therapy   Group Topic Covered balanced lifestyle;relaxation techniques;structured socialization   Group Session Detail Evening Relaxation   Patient Response/Contribution cooperative with task   Patient Participation Detail Cooperatively engaged in Evening Music Relaxation group to decrease anxiety and promote sleep.  Warm affect, appropriately engaged in session, responding well to the music.   Appears to blend in to group dynamics.  Appears thoughtful of others.  Presents as somewhat lower-functioning.  Pleasant.

## 2023-05-20 LAB
GLUCOSE BLDC GLUCOMTR-MCNC: 129 MG/DL (ref 70–99)
GLUCOSE BLDC GLUCOMTR-MCNC: 157 MG/DL (ref 70–99)

## 2023-05-20 PROCEDURE — G0177 OPPS/PHP; TRAIN & EDUC SERV: HCPCS

## 2023-05-20 PROCEDURE — H2032 ACTIVITY THERAPY, PER 15 MIN: HCPCS

## 2023-05-20 PROCEDURE — 124N000002 HC R&B MH UMMC

## 2023-05-20 PROCEDURE — 250N000013 HC RX MED GY IP 250 OP 250 PS 637: Performed by: NURSE PRACTITIONER

## 2023-05-20 RX ADMIN — METFORMIN ER 500 MG 2000 MG: 500 TABLET ORAL at 08:35

## 2023-05-20 RX ADMIN — RISPERIDONE 4 MG: 4 TABLET ORAL at 21:02

## 2023-05-20 RX ADMIN — INSULIN GLARGINE 15 UNITS: 100 INJECTION, SOLUTION SUBCUTANEOUS at 08:38

## 2023-05-20 RX ADMIN — Medication 250 MG: at 08:35

## 2023-05-20 ASSESSMENT — ACTIVITIES OF DAILY LIVING (ADL)
ADLS_ACUITY_SCORE: 18
ORAL_HYGIENE: INDEPENDENT
HYGIENE/GROOMING: INDEPENDENT
HYGIENE/GROOMING: INDEPENDENT
ADLS_ACUITY_SCORE: 18
DRESS: SCRUBS (BEHAVIORAL HEALTH)
LAUNDRY: UNABLE TO COMPLETE
ADLS_ACUITY_SCORE: 18
ORAL_HYGIENE: INDEPENDENT
ADLS_ACUITY_SCORE: 18
DRESS: SCRUBS (BEHAVIORAL HEALTH);INDEPENDENT

## 2023-05-20 NOTE — PLAN OF CARE
"Goal Outcome Evaluation:     Plan of Care Reviewed With: patient       Patient was up at breakfast time, flowsheets indicate he slept for 6.5 hours and he says he feels well rested.  No complaints of back pain this shift.  He allowed blood glucose check and insulin to be given without objection.  Denied anxiety, depression and all other mental health symptoms. Patient continues to report; \"I really don't wanna be here so I'm trying to do what I have to do to get out of here\".  Blood glucose was 157 - he said it might be a little bit higher because he ate so chips and salsa last evening.  Patient is med compliant, refused Flonase which he has been routinely.  Paced the halls occasionally and listened to headphones but did not appear agitated.  Denies SI, HI, SIBI and hallucinations.  Does not appear to be responding to internal stimuli this shift.  Patient reports feeling safe and agrees to notify staff if he is experiencing any discomforts.  Continues to require no roommate order due to paranoia.  /79 (BP Location: Right arm)   Pulse 85   Temp 98  F (36.7  C) (Oral)   Resp 16   Wt 89.3 kg (196 lb 14.4 oz)   SpO2 96%   BMI 32.77 kg/m      "

## 2023-05-20 NOTE — PROGRESS NOTES
"   05/20/23 1721   Group Therapy Session   Group Attendance attended group session   Time Session Began 1315   Time Session Ended 1400   Total Time (minutes) 45   Total # Attendees 4   Group Type recreation   Group Session Detail Education provided on the importance of healthy leisure, the relationship with boredom and relapse, and hands on experience with Skip-Peterson Card Game for healthy leisure practice, concentration, new learning, direction following, sequencing, logic and strategy skill practice, simple problem solving, coping, reality-based activity, mood stabilization, an opportunity to experience success, and expansion of social skills   Patient Participation Detail Pt's affect and energy level was improved vs earlier in the week when this writer last worked with pt.  He put forth much more effort and was genuinely invested in the activity.  He did need a lot of repetition and assistance to learn new information, but he did not give up.  As his skill level improved he voiced, \"See, I am getting it!\"  Pt appeared to benefit from the comradery of the group.  Pt remained for the full group session which has been a struggle for him in the past.       "

## 2023-05-20 NOTE — PLAN OF CARE
"Problem: Psychotic Signs/Symptoms  Goal: Improved Behavioral Control (Psychotic Signs/Symptoms)  Outcome: Progressing  Goal: Improved Mood Symptoms  Outcome: Progressing     Problem: Psychotic Signs/Symptoms  Goal: Improved Mood Symptoms  Outcome: Progressing    Plan of Care Reviewed With: patient      Pt is visible in the milieu. He is polite and cooperative with cares. He denies pain and all psych symptoms. . VSS. He ate 100% dinner. Receptive when redirected by staff to keep boundaries from J.P. Visit with his parents went well. He is compliant with psych med. Declined Nasal spray.  Pt complained of ringing in his ears at bedtime. He says he can't tell which ear. \"It could just be inside my brain\". Pt encouraged to say something if it becomes worst.  Will continue to monitor.      "

## 2023-05-20 NOTE — PROGRESS NOTES
NOC Shift Report    Pt in bed at beginning of shift, breathing quiet and unlabored.Pt slept 6.5 hrs.  Room blocked due to severe paranoia, delusional hpughts.

## 2023-05-21 LAB
GLUCOSE BLDC GLUCOMTR-MCNC: 126 MG/DL (ref 70–99)
GLUCOSE BLDC GLUCOMTR-MCNC: 139 MG/DL (ref 70–99)

## 2023-05-21 PROCEDURE — 124N000002 HC R&B MH UMMC

## 2023-05-21 PROCEDURE — 250N000013 HC RX MED GY IP 250 OP 250 PS 637: Performed by: NURSE PRACTITIONER

## 2023-05-21 RX ADMIN — Medication 250 MG: at 08:38

## 2023-05-21 RX ADMIN — RISPERIDONE 4 MG: 4 TABLET ORAL at 21:18

## 2023-05-21 RX ADMIN — FLUTICASONE PROPIONATE 1 SPRAY: 50 SPRAY, METERED NASAL at 21:18

## 2023-05-21 RX ADMIN — METFORMIN ER 500 MG 2000 MG: 500 TABLET ORAL at 08:38

## 2023-05-21 RX ADMIN — INSULIN GLARGINE 15 UNITS: 100 INJECTION, SOLUTION SUBCUTANEOUS at 08:38

## 2023-05-21 RX ADMIN — Medication 3 MG: at 22:13

## 2023-05-21 ASSESSMENT — ACTIVITIES OF DAILY LIVING (ADL)
DRESS: SCRUBS (BEHAVIORAL HEALTH);INDEPENDENT
ADLS_ACUITY_SCORE: 18
ADLS_ACUITY_SCORE: 18
LAUNDRY: UNABLE TO COMPLETE
ADLS_ACUITY_SCORE: 18
LAUNDRY: WITH SUPERVISION
ADLS_ACUITY_SCORE: 18
ADLS_ACUITY_SCORE: 18
DRESS: SCRUBS (BEHAVIORAL HEALTH)
HYGIENE/GROOMING: INDEPENDENT
ADLS_ACUITY_SCORE: 18
ORAL_HYGIENE: INDEPENDENT
ADLS_ACUITY_SCORE: 18
ORAL_HYGIENE: WITH SUPERVISION
ADLS_ACUITY_SCORE: 18
HYGIENE/GROOMING: INDEPENDENT

## 2023-05-21 NOTE — PROGRESS NOTES
05/20/23 2100   Group Therapy Session   Group Attendance attended group session   Time Session Began 2000   Time Session Ended 2050   Total Time (minutes) 50   Total # Attendees 3   Group Type recreation   Group Topic Covered relaxation techniques   Group Session Detail stress reduction   Patient Response/Contribution cooperative with task   Patient Participation Detail Pt actively participated in a structured Therapeutic Recreation group with a focus on leisure participation, socializing, and exercise. Pt participated in the guided exercise for the full duration of the group. Pt followed along, engaged in the guided chair exercise routine and added to the discussion prompts throughout the routine.  Pt was encouraged to use positive imagery with the deep breathing and stretching to foster relaxation, improves focus, and reduce stress. Pt presented in more of a calmer demeanor. Pt was more receptive to the group and did not interrupt as much compared to prior TR groups.

## 2023-05-21 NOTE — PLAN OF CARE
Problem: Psychotic Signs/Symptoms  Goal: Improved Behavioral Control (Psychotic Signs/Symptoms)  Outcome: Progressing     Problem: Depressive Symptoms  Goal: Depressive Symptoms  Description: Signs and symptoms of listed problems will be absent or manageable.  Outcome: Progressing    Plan of Care Reviewed With: patient      Pt is calmly pacing the hallway listening to headphones. He is denies having any concerns during check-in. Pt says he is used to having back pain and does not like to take medication this time. He approach RN to check BG which was 126. He later complained that his finger hurts saying writer poked it deeply.    Pt is medication compliant. No evidence of cheeking noted. PRN Melatonin given per request.

## 2023-05-21 NOTE — PLAN OF CARE
"Goal Outcome Evaluation:    Plan of Care Reviewed With: patient      Patient was up at the breakfast time, flowsheets indicate he slept for 5.75 hours and he says he feels well rested.  No complaints of back or tooth pain this shift.  He allowed blood glucose check and insulin to be given without objection.  Denied anxiety, depression and all other mental health symptoms. Patient continues to report; \"I really want to get out of here\".  Blood glucose was 139 - he said it was 129 at last check and it might be a little bit higher because he ate so chips last evening.  Patient is med compliant, refused Flonase which he has been routinely.  Paced the halls occasionally and listened to headphones but did not appear agitated.  Denies SI, HI, SIBI and hallucinations.  Does not appear to be responding to internal stimuli this shift.  Patient reports feeling safe and agrees to notify staff if he is experiencing any discomforts.  Continues to require no roommate order due to paranoia.  /82 (BP Location: Left arm)   Pulse 89   Temp (!) 96.3  F (35.7  C) (Oral)   Resp 16   Wt 87.7 kg (193 lb 4.8 oz)   SpO2 100%   BMI 32.17 kg/m      "

## 2023-05-22 LAB
GLUCOSE BLDC GLUCOMTR-MCNC: 156 MG/DL (ref 70–99)
GLUCOSE BLDC GLUCOMTR-MCNC: 163 MG/DL (ref 70–99)

## 2023-05-22 PROCEDURE — 99232 SBSQ HOSP IP/OBS MODERATE 35: CPT | Performed by: NURSE PRACTITIONER

## 2023-05-22 PROCEDURE — 124N000002 HC R&B MH UMMC

## 2023-05-22 PROCEDURE — 250N000013 HC RX MED GY IP 250 OP 250 PS 637: Performed by: NURSE PRACTITIONER

## 2023-05-22 PROCEDURE — G0177 OPPS/PHP; TRAIN & EDUC SERV: HCPCS

## 2023-05-22 RX ADMIN — METFORMIN ER 500 MG 2000 MG: 500 TABLET ORAL at 08:30

## 2023-05-22 RX ADMIN — INSULIN GLARGINE 15 UNITS: 100 INJECTION, SOLUTION SUBCUTANEOUS at 08:30

## 2023-05-22 RX ADMIN — NICOTINE POLACRILEX 2 MG: 2 GUM, CHEWING ORAL at 15:41

## 2023-05-22 RX ADMIN — RISPERIDONE 4 MG: 4 TABLET ORAL at 19:59

## 2023-05-22 RX ADMIN — NICOTINE POLACRILEX 2 MG: 2 GUM, CHEWING ORAL at 11:43

## 2023-05-22 ASSESSMENT — ACTIVITIES OF DAILY LIVING (ADL)
ADLS_ACUITY_SCORE: 18
ORAL_HYGIENE: WITH SUPERVISION
LAUNDRY: WITH SUPERVISION
HYGIENE/GROOMING: INDEPENDENT
ADLS_ACUITY_SCORE: 18
ADLS_ACUITY_SCORE: 18
DRESS: SCRUBS (BEHAVIORAL HEALTH)
HYGIENE/GROOMING: INDEPENDENT
ADLS_ACUITY_SCORE: 18
LAUNDRY: WITH SUPERVISION
ADLS_ACUITY_SCORE: 18
ORAL_HYGIENE: INDEPENDENT
ADLS_ACUITY_SCORE: 18
ADLS_ACUITY_SCORE: 18

## 2023-05-22 NOTE — PROGRESS NOTES
[facility],  Psychiatric Progress Note      Impression:     Alexander Mccoy is a 38-year-old male admitted to [address] on 4/22/2023.  He was admitted on a court hold from the [facility] ER due to psychosis.  He stated that he went to a police station because he believed undercover governmental agents were tracking him.  He said he no longer has a job because the government agents had infiltrated through his phone and computer.  He became involved in an altercation with the  at the [facility].  UTOX was negative and he denies substance abuse.  He was not taking any psychotropic medications prior to admission.  While in the ER, he was intermittently agitated.  He called 911.  During his time in the ER, a petition for commitment MI with [facility] was filed in [facility], and he was placed on a court hold.  He has type 2 diabetes.  HbA1c was 10.9.  He reports taking Metformin XR prior to admission.  While in the ER, he was inconsistently adherent to insulin and BG checks.  During the admission assessment, he reports rarely checking his blood glucose and states that his BG never exceeds 200, however BGs have often been over 300 since he was in the ER, which he relates to stress.  He says if he were not in the hospital, they would be lower.   He was not taking any psychotropic medications prior to admission.  Abilify was initiated in the ER, but he refused the second dose.  He states he will not take psychotropic medications nor consent to labs or insulin while hospitalized.  Throughout the conversation, he rarely responded directly to provider's inquiries and instead made comments about being tracked and the injustices of being hospitalized.  Since admission, Abilify was increased, and he refused 3/4 doses.  Abilify was then discontinued and replaced with Haldol; he took 2 doses and then refused the rest until Kristofer was  enacted.  Due to his complaints of side effects including drowsiness, Haldol was discontinued and replaced with Risperdal on 5/11.  PRNs of Melatonin, Hydroxyzine and Zyprexa were initiated.  He initially refused BG checks and insulin but has been adhering since the evening of 4/22 onward.   He is committed and Jarvised as of 5/2.  Upon receiving court paperwork, he kicked and punched the exit door and a behavioral code was called.  He subsequently was agreeable to taking medications and going to his room, so he was not placed in seclusion.  On 5/10 he pounded his fist on the desk, stating he wanted to discharge.  Symptoms have been improving since the change from Haldol to Risperdal.  Psychotic symptoms are slightly less prevalent.  He has been calmer.  Sleep is improved.  He has difficulty processing and retaining information.          Diagnoses:     Unspecified psychosis, likely paranoid schizophrenia  Type 2 diabetes, insulin dependent  Low back pain with left-sided sciatica  Mobility on left lower molar         Plan:     Medications: Continue Risperdal 4 mg at HS with a back up of IM Haldol 5 mg per Kristofer.  Continue PRNs of Melatonin, Hydroxyzine and Zyprexa.  Discontinued vitamin C at his request.      He is committed MI with [facility] (Abilify, Risperdal, Haldol, Zyprexa) in [facility].     Internal medicine signed off on diabetes management on 5/10.  He reports using FreeStyle Candi at home for blood glucose monitoring.  Plan for diabetes education consult.    First episode psychosis labs were unremarkable.     MoCA or SLUMS ordered.    He lives with his parents but is not welcome to return; his parents do not want him to know this.  Plan for IRTS placement when stable.  He prefers somewhere close to [place] that allows smoking.  Plan for ENT referral for tinnitus.         Attestation:  Patient has been seen and evaluated by me, Savannah oPpe, CLARA CNP  The patient was counseled on nature of  "illness and treatment plan/options  Care was coordinated with treatment team  Total time > 35 minutes           Interim History:     The patient's care was discussed with the treatment team and chart notes were reviewed.  Pt was documented as sleeping 6.5 - 6.75 hours during the weekend overnight shifts.  He took PRNs of Tylenol and Melatonin.  He has been refusing some doses of Flonase.  BGs have been improved, in the low to mid 100's, and he reports consuming fewer carbs.  He attended groups and did well.  He enjoyed a visit from his parents.  Pt was reluctant to agree to MoCA or SLUMS for cognitive evaluation.  He does not perceive himself as having difficulty with concentration, memory or processing information.  He said that if this used to be the case, it was because of drowsiness related to Haldol and the tooth pain he was experiencing.  Pt did eventually agree to MoCA or SLUMS.  Pt reports his tooth is no longer painful and is not as loose.  States he has back pain with left-sided sciatica and that Ibuprofen is helpful, but \"I don't see the point of taking something temporary.\"  He said pain relief lasts for about 8 hours.  He states he takes Ibuprofen when his pain is worse than usual.  Pt independently asked for a diabetes education consult and was reminded that this will be completed closer to discharge.  Pt requested that vitamin C be discontinued and also asked for nicotine gum; states he was smoking 1/2 pack of cigarettes daily.  He remains dismissive when talking about his thoughts regarding \"undercovers\" and states he does not anticipate this will be an issue after discharge.   Pt is overall more receptive to IRTS placement after talking to CTC last Friday and stated he has no present concerns regarding this plan.         Medications:     Current Facility-Administered Medications   Medication     acetaminophen (TYLENOL) tablet 650 mg     alum & mag hydroxide-simethicone (MAALOX) suspension 30 mL     " benztropine (COGENTIN) tablet 1 mg     glucose gel 15-30 g    Or     dextrose 50 % injection 25-50 mL    Or     glucagon injection 1 mg     fluticasone (FLONASE) 50 MCG/ACT spray 1 spray     gabapentin (NEURONTIN) capsule 300 mg     risperiDONE (risperDAL) tablet 4 mg    Or     haloperidol lactate (HALDOL) injection 5 mg     hydrOXYzine (ATARAX) tablet 25 mg     ibuprofen (ADVIL/MOTRIN) tablet 600 mg     insulin glargine (LANTUS PEN) injection 15 Units     melatonin tablet 3 mg     menthol (Topical Analgesic) 2.5% (BENGAY VANISHING SCENT) 2.5 % topical gel     metFORMIN (GLUCOPHAGE XR) 24 hr tablet 2,000 mg     nicotine (NICORETTE) gum 2 mg     OLANZapine (zyPREXA) tablet 10 mg    Or     OLANZapine (zyPREXA) injection 10 mg     senna-docusate (SENOKOT-S/PERICOLACE) 8.6-50 MG per tablet 1 tablet     sodium chloride (OCEAN) 0.65 % nasal spray 1 spray             Allergies:   No Known Allergies         Psychiatric Examination:     /67   Pulse 98   Temp 97.5  F (36.4  C) (Oral)   Resp 16   Wt 87.7 kg (193 lb 4.8 oz)   SpO2 98%   BMI 32.17 kg/m      Appearance:  awake, alert, disheveled, dressed in scrubs  Attitude:  cooperative  Eye Contact:  fair  Mood:  frustrated and bored related to being hospitalized  Affect:  normal range  Speech:  clear, coherent, normal volume   Psychomotor Behavior:  no evidence of tardive dyskinesia, dystonia, or tics  Thought Process:  somewhat disorganized, illogical  Associations:  no loose associations  Thought Content:  denies suicidal and homicidal ideation, paranoid delusions are present but less pervasive compared to admission, denies hallucinations  Insight:  limited  Judgment:  limited  Oriented to:  date, time, person, and place  Attention Span and Concentration:  fair  Recent and Remote Memory:  some short-term impairment  Language:  intact, fluent English  Fund of Knowledge:  appropriate  Muscle Strength and Tone:  normal  Gait and Station:  normal          Labs:      Recent Results (from the past 24 hour(s))   Glucose by meter    Collection Time: 05/21/23  4:31 PM   Result Value Ref Range    GLUCOSE BY METER POCT 126 (H) 70 - 99 mg/dL   Glucose by meter    Collection Time: 05/22/23  8:27 AM   Result Value Ref Range    GLUCOSE BY METER POCT 163 (H) 70 - 99 mg/dL

## 2023-05-22 NOTE — PLAN OF CARE
"      Tasks Complete:    Chart review     Team meeting     CTC met with [facility] CM ( who provided interpreting services), and pt mom. Mom advocated for pt to go to an irts because she does not trust that he will take his medications and lack of supervision. She states that she has seen overall improvement in pt symptoms. CTC asked mom specific improvements has she observed. Mom notes, \" he is building better relationships with his family and he is being mindful of his diabetes. He is refusing soda and is watching his carbs. \" Mom notes that she thinks the medications are helping. CTC and CM will continue to follow up with mom as needed     CTC met with pt to address pt questions regarding IRTS. PT wants co ed facility within 1 hour and 30 minutes from [place]. CTC and pt identified each placement     Current Symptoms include the following: See RN note     Addressed patient needs/concerns: See above intervention    Discharge Plan or Goal   Plan  TBD likely discharge to an IRTS     Commitment  Commited and Jarvised 05/02/2023-11/02/2023  [id number]    Care Team   No outpatient care team   Father - Gokul Mccoy ([phone])  Civil commitment - Fátima Nunez- [phone]     Barriers to Discharge   Ongoing stabilization  Committed and Jarvised      Referral Status  PT would benefit from  such as case management, psychiatry, OP programming. No referrals have been made as of yet.      Legal Status  Committed and Jarvised        "

## 2023-05-22 NOTE — PLAN OF CARE
"Goal Outcome Evaluation:     Plan of Care Reviewed With: patient       Patient was up at the breakfast time, flowsheets indicate he slept for 6.75 hours and he says he feels well rested.  No complaints of back or tooth pain this shift.  He allowed blood glucose check and insulin to be given without objection.  Denied anxiety, depression and all other mental health symptoms. Patient continues to report; \"I really want to get out of here\".  Blood glucose was 163 - he said it might be a little bit higher because he ate some pears last evening.  Patient is med compliant, refused Flonase which he has been routinely.  Paced the halls occasionally and listened to headphones but did not appear agitated.  Denies SI, HI, SIBI and hallucinations.  Does not appear to be responding to internal stimuli this shift.  Patient reports feeling safe and agrees to notify staff if he is experiencing any discomforts.  Continues to require no roommate order due to paranoia.  /67   Pulse 98   Temp 97.5  F (36.4  C) (Oral)   Resp 16   Wt 87.7 kg (193 lb 4.8 oz)   SpO2 98%   BMI 32.17 kg/m      " Heart failure assessment reviewed     So far weight decreased by 2 lb.  Blood pressures remain slightly elevated.  Start losartan 25 mg daily    Thank you

## 2023-05-22 NOTE — PLAN OF CARE
05/22/23 1110   Individualization/Patient Specific Goals   Patient Personal Strengths expressive of emotions;positive educational history;positive vocational history;stable living environment   Patient Vulnerabilities family/relationship conflict;lacks insight into illness;occupational insecurity;limited support system   Anxieties, Fears or Concerns his anxiety is reducing since admission. He does not note and fears or anxities at this time   Individualized Care Needs Diabities management   Interprofessional Rounds   Summary He is committed and Jarvised. Pt lacks insight to his mental illness but is showing slight improvement regarding his MI. He continues to go to groups and take his medications. He is currently undergoing med adjustments and requires ongoing stabilization. Plan for IRTS placement. Family notes pt cannot return home with out completing treament. Family notes that there isnot enough supervision at home and does not think pt will take his medications. No referrals have been made as of yet. Pt requires ongoing stabiliation.   Participants advanced practice nurse;CTC;nursing   Behavioral Team Discussion   Participants Provider: Savannah Pope,NP, CTC: Kasandra DRISCOLL [facility], Nursing:  Reyna PEDRAZA   Progress Requires ongoing stabilization   Anticipated length of stay 1-2 weeks   Continued Stay Criteria/Rationale ongoing statbilization   Medical/Physical dx with diabeties type 2- pt has BG regime   Precautions see below   Plan IRTS   Rationale for change in precautions or plan Family does not want pt to return home after discharging from hospital. They are advocating for IRTS   Anticipated Discharge Disposition IRTS

## 2023-05-22 NOTE — PROGRESS NOTES
"   05/22/23 1450   Group Therapy Session   Group Attendance attended group session   Time Session Began 1315   Time Session Ended 1400   Total Time (minutes) 45   Total # Attendees 4   Group Type recreation   Group Session Detail Bowling and Bean Bag Toss for concentration, large motor activity, coordination, balance, sequencing, simple problem solving (when calculating scores), frustration tolerance, an opportunity to experience success and socialization.   Patient Participation Detail Pt was pleasant, but initially quite confused by the sequencing and scoring of each of the large motor tasks.  As group progressed, pt's understanding improved with repetition.  Pt appeared to enjoy the activity and he was very invested in \"winning.\"       "

## 2023-05-22 NOTE — PROGRESS NOTES
05/22/23 1426   Group Therapy Session   Group Attendance attended group session   Time Session Began 1015   Time Session Ended 1130   Total Time (minutes) 70   Total # Attendees 4   Group Type life skill   Group Session Detail Bridge to self-confidence processing activity for concentration, sharing thoughts/feelings, expressing emotions, follow coping, reality-based activity, mood stabilization, building insight and expanding social skills, follow through, improving self-awareness, motivation for change, and instilling hope for a sustainable recovery.   Patient Participation Detail Pt was pleasant and invested in the group activity.  Pt was insightful on a few questions and gave concrete answers that did not quite answer the questions posed at other times.  Pt was social with peers and his affect brightened considerable with the comradery of the group.

## 2023-05-22 NOTE — PROGRESS NOTES
Pt appears to be sleeping 6.75 hrs. Pt is on status 15 min checks. Will continue to monitor. No roommate order due to severe paranoia, delusional thoughts.

## 2023-05-23 LAB
GLUCOSE BLDC GLUCOMTR-MCNC: 129 MG/DL (ref 70–99)
GLUCOSE BLDC GLUCOMTR-MCNC: 169 MG/DL (ref 70–99)

## 2023-05-23 PROCEDURE — 124N000002 HC R&B MH UMMC

## 2023-05-23 PROCEDURE — 250N000013 HC RX MED GY IP 250 OP 250 PS 637: Performed by: NURSE PRACTITIONER

## 2023-05-23 PROCEDURE — 99232 SBSQ HOSP IP/OBS MODERATE 35: CPT | Performed by: NURSE PRACTITIONER

## 2023-05-23 PROCEDURE — G0177 OPPS/PHP; TRAIN & EDUC SERV: HCPCS

## 2023-05-23 RX ADMIN — IBUPROFEN 600 MG: 600 TABLET ORAL at 16:58

## 2023-05-23 RX ADMIN — METFORMIN ER 500 MG 2000 MG: 500 TABLET ORAL at 08:25

## 2023-05-23 RX ADMIN — IBUPROFEN 600 MG: 600 TABLET ORAL at 09:41

## 2023-05-23 RX ADMIN — RISPERIDONE 4 MG: 4 TABLET ORAL at 20:02

## 2023-05-23 RX ADMIN — INSULIN GLARGINE 15 UNITS: 100 INJECTION, SOLUTION SUBCUTANEOUS at 08:20

## 2023-05-23 ASSESSMENT — ACTIVITIES OF DAILY LIVING (ADL)
ORAL_HYGIENE: INDEPENDENT
ORAL_HYGIENE: INDEPENDENT
ADLS_ACUITY_SCORE: 18
DRESS: INDEPENDENT
HYGIENE/GROOMING: INDEPENDENT
ADLS_ACUITY_SCORE: 18
ADLS_ACUITY_SCORE: 18
LAUNDRY: WITH SUPERVISION
ADLS_ACUITY_SCORE: 18
HYGIENE/GROOMING: INDEPENDENT
ADLS_ACUITY_SCORE: 18
DRESS: INDEPENDENT
ADLS_ACUITY_SCORE: 18
ADLS_ACUITY_SCORE: 18

## 2023-05-23 NOTE — CARE PLAN
OT PROGRESS NOTE:     05/23/23 1248   Group Therapy Session   Group Attendance attended group session   Time Session Began 1015   Time Session Ended 1100   Total Time (minutes) 45   Total # Attendees 6   Group Type expressive therapy;task skill   Group Topic Covered balanced lifestyle;coping skills/lifestyle management;emotions/expression;leisure exploration/use of leisure time   Group Session Detail Crafts/Creative Expression to promote focus/concentration, problem solving, planning, organization coping skill awareness and reintroduction and increased self awareness for improved self management.      Patient Response/Contribution cooperative with task;discussed personal experience with topic   Patient Participation Detail Initiated group following announcement. Anxious affect and mood as evidenced by indecisiveness and wondering about the room. Verbalized being anxious and wanting discharge ASAP. Waiting to get into an IRTS. Requested to work on a word search (simple cognitive task) and did so x 25 minutes before hitting a road block  (inability to find one word). After helping him found he had already located it. Denies need to be here or need for medications but, willing to comply to get out of here. Appropriately social with peers. Enjoys conversing about music and musicians.

## 2023-05-23 NOTE — PLAN OF CARE
Goal Outcome Evaluation:    Plan of Care Reviewed With: patient      Patient was visible in the milieu for almost the entire shift. He was polite and cooperative with staff and interacted appropriately with peers. Patient's affect was blunted but brightened upon approach. Patient's mood was calm. Patient denied all mental health concerns including SI/SIB/AVH/HI. Patient attended and participated in group. He was medication compliant and received PRN nicotine gum for cravings. VSS, patient denied pain, BG was 156 before dinner. No behavioral concerns this shift.     Patient remains on cheeking precautions. Active order for no roommate due to severe paranoia, delusional thoughts and insomnia.    /76 (BP Location: Right arm, Patient Position: Sitting, Cuff Size: Adult Regular)   Pulse 83   Temp 98.7  F (37.1  C) (Oral)   Resp 16   Wt 87.7 kg (193 lb 4.8 oz)   SpO2 98%   BMI 32.17 kg/m

## 2023-05-23 NOTE — PLAN OF CARE
"Pt appeared to be sleeping/resting in bed the majority of the night as staff did safety checks.  No complaints made to staff.  No PRN medications given. He is awake at 5:30 am and approached to requests hot tea.  Approximately 6 hours of sleep is recorded.   Pt reports he had \"good\" sleep.   "

## 2023-05-23 NOTE — CARE PLAN
05/23/23 1617   Group Therapy Session   Group Attendance attended group session   Time Session Began 1315   Time Session Ended 1400   Total Time (minutes) 45   Total # Attendees 4   Group Type community;life skill;psychoeducation   Group Topic Covered coping skills/lifestyle management;relationship;structured socialization   Group Session Detail Group activity to encourage connection, communication, and self-reflection through discussion topic cards.   Patient Participation Detail Pt participated in a group activity promoting connection and communication. Initially, pt joined group and quickly left after writer explained the activity. Pt returned some time after to participate and answer discussion prompts. Pt appeared open and comfortable sharing personal experiences and thoughts when prompted. Pt shared that pt would want to apologize to a few family members, saying that they recognized they may have been experiencing paranoia during the time to warrant apologies.

## 2023-05-23 NOTE — PROGRESS NOTES
"OCCUPATIONAL THERAPY:  MoCA   VERSION 1 WAS ADMINISTERED  Patient Name: Alexander Mccoy  Date of Assessment: May 23,2023      Time of day:  1:00pm     THE RESULTS OF THIS ASSESSMENT PROVIDE RECOMMENDATIONS BASED UPON FUNCTION AT THE TIME OF ADMINISTRATION ONLY, MAY NOT REFLECT BASELINE FUNCTION.    JORGE COGNITIVE ASSESSMENT (MoCA)   The [place] Cognitive Assessment (MoCA) was designed as a rapid screening instrument for mild cognitive dysfunction. It assesses different cognitive domains: attention and concentration, executive functions, memory, language, visuoconstructional skills, conceptual thinking, calculations, and orientation. The total possible score is 30 points; a score of 26 or above is considered normal.    JORGE COGNITIVE ASSESSMENT SCORE:   28 / 30   which is within the normal range.     Draw a Clock Subtest: Score:    3 / 3     Details of scores:    SUBTASK SCORE SUBTASK SCORE   Visuospatial/Executive    5/5 Abstraction   2/2   Naming    3/3 Delayed Recall   4/5   Attention    6/6 Orientation   6/6   Language    2/3       ASSESSMENT:  Alexander was seen at 1:00 pm to complete a MoCA Version 8.1 screening per provider order. Expressed anxiety surrounding the testing but, agreed to move forward with the screening. \"Kaylie is worried I get too distracted/lose focus but, I just want to leave. I think she just wants to give me more medications.\"   Pleasant and cooperative throughout testing time. Scored 28/30, which is within normal range. No further plans needed at this time.  "

## 2023-05-23 NOTE — PLAN OF CARE
"  Problem: Suicidal Behavior  Goal: Suicidal Behavior is Absent or Managed  Outcome: Progressing     Problem: Psychotic Signs/Symptoms  Goal: Improved Behavioral Control (Psychotic Signs/Symptoms)  Outcome: Progressing     Problem: Depressive Symptoms  Goal: Depressive Symptoms  Description: Signs and symptoms of listed problems will be absent or manageable.  Outcome: Progressing   Goal Outcome Evaluation:     Patient awake and asking for morning medications at 0755. Patient was calm and cooperative and waited patiently as writer gathered supplies for Blood glucose check and medications for him.    Upon check in with writer Patient denied all psychiatric symptoms including depression, anxiety and SI. He stated that he felt \"good today\". At 0941 he had complaints of back pain and rated it 4/10. He requested and was given Ibuprofen for relief of pain. At 1041 he rated his back pain 2/10 and stated that the Ibuprofen was \"helpful\" and \"I'm good now, its ok\".    Patient attended several groups throughout the shift and spent some time in the afternoon watching television in the lounge with peers.  Ate all of his breakfast and lunch.    Precautions: Cheeking                 "

## 2023-05-23 NOTE — PROGRESS NOTES
[facility],  Psychiatric Progress Note      Impression:     Alexander Mccoy is a 38-year-old male admitted to [address] on 4/22/2023.  He was admitted on a court hold from the [facility] ER due to psychosis.  He stated that he went to a police station because he believed undercover governmental agents were tracking him.  He said he no longer has a job because the government agents had infiltrated through his phone and computer.  He became involved in an altercation with the  at the [facility].  UTOX was negative and he denies substance abuse.  He was not taking any psychotropic medications prior to admission.  While in the ER, he was intermittently agitated.  He called 911.  During his time in the ER, a petition for commitment MI with [facility] was filed in [facility], and he was placed on a court hold.  He has type 2 diabetes.  HbA1c was 10.9.  He reports taking Metformin XR prior to admission.  While in the ER, he was inconsistently adherent to insulin and BG checks.  During the admission assessment, he reports rarely checking his blood glucose and states that his BG never exceeds 200, however BGs have often been over 300 since he was in the ER, which he relates to stress.  He says if he were not in the hospital, they would be lower.   He was not taking any psychotropic medications prior to admission.  Abilify was initiated in the ER, but he refused the second dose.  He states he will not take psychotropic medications nor consent to labs or insulin while hospitalized.  Throughout the conversation, he rarely responded directly to provider's inquiries and instead made comments about being tracked and the injustices of being hospitalized.  Since admission, Abilify was increased, and he refused 3/4 doses.  Abilify was then discontinued and replaced with Haldol; he took 2 doses and then refused the rest until Kristofer was  enacted.  Due to his complaints of side effects including drowsiness, Haldol was discontinued and replaced with Risperdal on 5/11.  PRNs of Melatonin, Hydroxyzine and Zyprexa were initiated.  He initially refused BG checks and insulin but has been adhering since the evening of 4/22 onward.   He is committed and Jarvised as of 5/2.  Upon receiving court paperwork, he kicked and punched the exit door and a behavioral code was called.  He subsequently was agreeable to taking medications and going to his room, so he was not placed in seclusion.  On 5/10 he pounded his fist on the desk, stating he wanted to discharge.  Symptoms have been improving since the change from Haldol to Risperdal.  Psychotic symptoms are significantly reduced.  He is no longer irritable.  Sleep is improved.  He has some difficulty processing and retaining information.          Diagnoses:     Unspecified psychosis, likely paranoid schizophrenia  Type 2 diabetes, insulin dependent  Low back pain with left-sided sciatica  Mobility on left lower molar         Plan:     Medications: Continue Risperdal 4 mg at HS with a back up of IM Haldol 5 mg per Miner.  Continue PRNs of Melatonin, Hydroxyzine and Zyprexa.     He is committed MI with [facility] (Abilify, Risperdal, Haldol, Zyprexa) in [facility].     Internal medicine signed off on diabetes management on 5/10.  He reports using FreeStyle Candi at home for blood glucose monitoring.  Diabetes education consult ordered with request for list of supplies needed for consult prior to discharge.    First episode psychosis labs were unremarkable.     MoCA or SLUMS ordered.    He lives with his parents but is not welcome to return; his parents do not want him to know this.  IRTS referrals are in process.  He prefers somewhere close to [place] that allows smoking.  Plan for ENT referral for tinnitus.         Attestation:  Patient has been seen and evaluated by me, Savannah Pope, LCARA BENJAMIN  The  "patient was counseled on nature of illness and treatment plan/options  Care was coordinated with treatment team  Total time > 35 minutes           Interim History:     The patient's care was discussed with the treatment team and chart notes were reviewed.  Pt was documented as sleeping 6 hours during the overnight shift.  He attended 3 groups yesterday and did well.  He spent some time walking in the gonzales listening to music.  He has been calm and cooperative during interactions with staff.  He refused both doses of Flonase yesterday, stating he doesn't always need it.  He used PRN Nicotine gum twice.  BGs were 163 and 156.  States he has had some good phone conversations with family, who informed staff that he has been calmer and appears to have a better understanding of diabetes.  Today, pt said, \"The med helps me a lot.  I've been calmer in a good way.  I can't really explain why but it helps.\"  He denies side effects.  Regarding \"undercovers,\" he said, \"I don't think about them anymore.\"  Pt agreeable to have a roommate and his only hesitation was noting that the proposed roommate has emitted flatulence in the milieu and pt is concerned he will do so in their room.  Pt complains of dry feet and was informed there is lotion available on the unit.  Diabetes educator consult ordered.  Expect MoCA or SLUMS to be completed today or tomorrow.           Medications:     Current Facility-Administered Medications   Medication     acetaminophen (TYLENOL) tablet 650 mg     alum & mag hydroxide-simethicone (MAALOX) suspension 30 mL     benztropine (COGENTIN) tablet 1 mg     glucose gel 15-30 g    Or     dextrose 50 % injection 25-50 mL    Or     glucagon injection 1 mg     fluticasone (FLONASE) 50 MCG/ACT spray 1 spray     gabapentin (NEURONTIN) capsule 300 mg     risperiDONE (risperDAL) tablet 4 mg    Or     haloperidol lactate (HALDOL) injection 5 mg     hydrOXYzine (ATARAX) tablet 25 mg     ibuprofen (ADVIL/MOTRIN) tablet " "600 mg     insulin glargine (LANTUS PEN) injection 15 Units     melatonin tablet 3 mg     menthol (Topical Analgesic) 2.5% (BENGAY VANISHING SCENT) 2.5 % topical gel     metFORMIN (GLUCOPHAGE XR) 24 hr tablet 2,000 mg     nicotine (NICORETTE) gum 2 mg     OLANZapine (zyPREXA) tablet 10 mg    Or     OLANZapine (zyPREXA) injection 10 mg     senna-docusate (SENOKOT-S/PERICOLACE) 8.6-50 MG per tablet 1 tablet     sodium chloride (OCEAN) 0.65 % nasal spray 1 spray             Allergies:   No Known Allergies         Psychiatric Examination:     BP (!) 113/94   Pulse 83   Temp 97.4  F (36.3  C)   Resp 16   Wt 86.6 kg (191 lb)   SpO2 100%   BMI 31.78 kg/m      Appearance:  awake, alert, somewhat disheveled, dressed in scrubs  Attitude:  cooperative  Eye Contact:  good  Mood:  \"calm\"  Affect:  normal range  Speech:  clear, coherent, normal volume   Psychomotor Behavior:  no evidence of tardive dyskinesia, dystonia, or tics  Thought Process:  mildly disorganized, not entirely logical  Associations:  no loose associations  Thought Content:  denies suicidal and homicidal ideation, paranoid delusions are significantly reduced compared to admission, denies hallucinations  Insight:  partial  Judgment:  fair  Oriented to:  date, time, person, and place  Attention Span and Concentration:  fair, improved  Recent and Remote Memory:  some short-term impairment  Language:  intact, fluent English  Fund of Knowledge:  appropriate  Muscle Strength and Tone:  normal  Gait and Station:  normal          Labs:     Recent Results (from the past 24 hour(s))   Glucose by meter    Collection Time: 05/22/23  4:42 PM   Result Value Ref Range    GLUCOSE BY METER POCT 156 (H) 70 - 99 mg/dL   Glucose by meter    Collection Time: 05/23/23  8:18 AM   Result Value Ref Range    GLUCOSE BY METER POCT 169 (H) 70 - 99 mg/dL     "

## 2023-05-23 NOTE — CARE PLAN
05/22/23 2154   Group Therapy Session   Time Session Began 2005   Time Session Ended 2055   Total Time (minutes) 45   Total # Attendees 6   Group Type community;life skill;recreation   Patient Participation Detail Leisure exploration and participation group to encourage new learning, problem solving, focus, socialization, memory/recall, and cognitive wellness. Alongside group game was discussion r/t dimensions of wellness. Pt response: Pt was an engaged participant in both discussive and leisure components of group. Pt able to name a modality of wellness they use to challenge their cognition. Alexander demonstrated understanding of task parameters. Pt remained focused and engaged for the full duration of group. Pt was an encouraging group participant, exhibiting prosocial engagement with peers.

## 2023-05-23 NOTE — PLAN OF CARE
"      Tasks Complete:    Chart review     Team meeting     [facility] assisted pt with calling [facility] regarding SNAP benefits.     [facility] spoke with pt about IRTS placement. He said that he wants a single room but is okay with sharing a room. He said \" it would be hard to room with someone who snores though\". He is willing to wait for a single bedroom IRTS. He appeared goal oriented.     [facility] updated CM     IRTS referrals   o [facility] [place] [facility]  o [facility] and [facility]  o [facility]  o [place] [facility]  o [facility]- [facility] locations  o [facility]- [place], Jacy Julien, Kerry Trevizo all have limited single rooms  o [facility] and [place]  o [facility] in [place]  o [facility]     Current Symptoms include the following: See RN note     Addressed patient needs/concerns: See above intervention    Discharge Plan or Goal   Plan  TBD likely discharge to an IRTS     Commitment  Commited and Jarvised 05/02/2023-11/02/2023  22-AP-CS-    Care Team   No outpatient care team   Father - Gokul Mccoy ([phone])  Civil commitment - Fátima Nunez- [phone]     Barriers to Discharge   Ongoing stabilization  Committed and Jarvised      Referral Status  PT would benefit from  such as case management, psychiatry, OP programming. No referrals have been made as of yet.      Legal Status  Committed and Jarvised        "

## 2023-05-23 NOTE — CARE PLAN
05/23/23 1400   Group Therapy Session   Group Attendance attended group session   Time Session Began 1315   Time Session Ended 1415   Total Time (minutes) 30   Total # Attendees 4   Group Type psychotherapeutic   Group Topic Covered cognitive therapy techniques   Group Session Detail Patients were familiarized with the aspects of psychological functioning and the skills required to help reorient their thinking, feeling, and behaving   Patient Response/Contribution listened actively;did not discuss personal experience   Patient Participation Detail Pt entered group about 30 minutes into group. He sat politely, seemed to be listening, but did not offer any suggestions or personal experiences.

## 2023-05-23 NOTE — PLAN OF CARE
Goal Outcome Evaluation:    Patient was visible in the milieu for most of the entire shift, he spent most of his time walking in the halls listening to music or conversing with peers. He was polite and cooperative with staff and interacted appropriately with peers. Patient's affect was blunted but brightened upon approach. Patient's mood was calm. Patient denied all mental health concerns including SI/SIB/AVH/HI. He was medication compliant and received PRN ibuprofen for 3/10 low back pain, patient stated medication was helpful. VSS, patient denied pain, BG was 129 before dinner. No behavioral concerns this shift.      Patient remains on cheeking precautions.    /78 (BP Location: Left arm)   Pulse 77   Temp 98  F (36.7  C) (Oral)   Resp 16   Wt 86.6 kg (191 lb)   SpO2 95%   BMI 31.78 kg/m

## 2023-05-24 LAB
GLUCOSE BLDC GLUCOMTR-MCNC: 127 MG/DL (ref 70–99)
GLUCOSE BLDC GLUCOMTR-MCNC: 133 MG/DL (ref 70–99)

## 2023-05-24 PROCEDURE — 124N000002 HC R&B MH UMMC

## 2023-05-24 PROCEDURE — G0177 OPPS/PHP; TRAIN & EDUC SERV: HCPCS

## 2023-05-24 PROCEDURE — 250N000013 HC RX MED GY IP 250 OP 250 PS 637: Performed by: NURSE PRACTITIONER

## 2023-05-24 PROCEDURE — 99232 SBSQ HOSP IP/OBS MODERATE 35: CPT | Performed by: NURSE PRACTITIONER

## 2023-05-24 RX ADMIN — INSULIN GLARGINE 15 UNITS: 100 INJECTION, SOLUTION SUBCUTANEOUS at 08:22

## 2023-05-24 RX ADMIN — METFORMIN ER 500 MG 2000 MG: 500 TABLET ORAL at 08:20

## 2023-05-24 RX ADMIN — RISPERIDONE 4 MG: 4 TABLET ORAL at 20:59

## 2023-05-24 ASSESSMENT — ACTIVITIES OF DAILY LIVING (ADL)
HYGIENE/GROOMING: INDEPENDENT
ADLS_ACUITY_SCORE: 18
ORAL_HYGIENE: INDEPENDENT
ADLS_ACUITY_SCORE: 18
DRESS: INDEPENDENT
ADLS_ACUITY_SCORE: 18

## 2023-05-24 NOTE — CARE PLAN
OT PROGRESS NOTE:     05/24/23 1259   Group Therapy Session   Group Attendance attended group session   Time Session Began 1115   Time Session Ended 1200   Total Time (minutes) 10   Total # Attendees 5   Group Type psychoeducation;recreation;task skill   Group Topic Covered balanced lifestyle;coping skills/lifestyle management;emotions/expression;relationship   Group Session Detail Self-Esteem Jenga to promote self awareness, insight, positive self talk and experiential learning of self esteem building activities.      Patient Response/Contribution able to recall/repeat info presented;cooperative with task;discussed personal experience with topic   Patient Participation Detail Initiated group, however, became negative and disinterested when the activity required fine motor skill. Became frustrated when instructed to use one hand at a time. Responses to self esteem questions were appropriate and focused. At one point stated he was rough and tough as a kid and now sees he needs to be more open and honest to make friends, which he very much wants to have.  Dismissive when complimented on insightfulness of positive strategies. Left group when the fine motor aspect of activity became more difficulty. Noted it was too hard and he didn't want to work that hard.

## 2023-05-24 NOTE — CARE PLAN
"TANA ROSELINE NOTE:     05/24/23 1257   Group Therapy Session   Group Attendance attended group session   Time Session Began 1015   Time Session Ended 1100   Total Time (minutes) 45   Group Type expressive therapy;task skill   Group Topic Covered balanced lifestyle;coping skills/lifestyle management;emotions/expression;cognitive activities   Group Session Detail Crafts/Creative Expression to promote focus/concentration, problem solving, planning, organization coping skill awareness and reintroduction and increased self awareness for improved self management.      Patient Response/Contribution cooperative with task;discussed personal experience with topic;expressed readiness to alter behaviors;organized   Patient Participation Detail Initiated group, was offered a new project but, declined and returned to word search activity. 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 Blamed others for his failure. Even when given an out he continued to blame others. Acknowledged he loses focus and/or interest and not wanting to work too hard. Stated he would be good working as a  when peer noted that is what she does. Did share he would take his meds when he goes to supervised living even though he does not need them. Denies mental illness. Verbalized,  \"I feel better on them but I can't explain it.\" Indicated he is calmer and more tolerant of others.        "

## 2023-05-24 NOTE — PLAN OF CARE
Pt appeared to sleep 4.5 hours tonight.  He was observed to be awake briefly at 3 am and again at 5:15 am when he walked into the lounge to get ice water.  Pt remains calm and appropriate when awake.  He did not offer any concerns nor did he make any requests to staff.  He returned to rest in bed at 6 am.  No issues reported from Pt or his new roommate tonight.

## 2023-05-24 NOTE — PROGRESS NOTES
Behavioral Health  Note   Behavioral Health  Spirituality Group Note     Unit 4A    Name: Alexander Mccoy    YOB: 1985   MRN: [id number]    Age: 38 year old     Patient attended -led group, which included discussion of spirituality, coping with illness and building resilience.   Patient attended group for [facility] - spirituality groups are not billed.   The patient actively participated in group discussion     [facility]  Staff    Page [phone]

## 2023-05-24 NOTE — PLAN OF CARE
"  Problem: Suicidal Behavior  Goal: Suicidal Behavior is Absent or Managed  Outcome: Progressing     Problem: Psychotic Signs/Symptoms  Goal: Improved Behavioral Control (Psychotic Signs/Symptoms)  Outcome: Progressing     Problem: Psychotic Signs/Symptoms  Goal: Increased Participation and Engagement (Psychotic Signs/Symptoms)  Outcome: Progressing   Goal Outcome Evaluation:     Patient woke up this morning shortly after 0800 and requesting a Blood glucose check and medications. During injection of insulin this writer initially could not get the insulin pen to release Insulin into the arm. Writer changed the needle and attempted to inject it again. The insulin pen functioned appropriately and 15 units were injected as ordered into the arm. Patient made several statements to writer that \"it didn't work\" and he \"did not get any of it injected\".  Writer explained to him that it did indeed work and that he was given the 15 units as ordered. Writer also showed him the pen and that it was no longer set at 15 but 0 because it was given. Patient stated that there was \"still insulin\" he could see \"in the pen\" and requested writer to inject him again. Writer assured him again it was administered and that it would be dangerous to inject again. Patient said \"ok\" and walked away.    Patient was calm and cooperative throughout the shift and denied any feelings of anxiety, depression and SI. Also stated \"I have never been depressed or anxious\". He attended several groups throughout the day and watched television in the lounge.     Precautions: Cheeking                 "

## 2023-05-24 NOTE — PLAN OF CARE
Appointment   Interview with [facility] [facility] tomorrow 5/25 at 12:30PM via teams      Tasks Complete:    Chart review     Team meeting     CTC spoke with pt about [facility]    IRTS placements updated    CTC confirmed that pt has an MA plan for IRTS placement       IRTS referrals   o [facility] [place] [facility]- complete  o [facility] and [facility]- complete   o [facility]-Director is out of the office until after memorial weekend. No immediate openings. Likely two weeks out.  o [place] [facility]- complete   o [facility]- all locations-complete   o [facility]- [place], [facility] Julien, Kerry Trevizo all have limited single rooms-complete   o [facility] and [place]-complete   o [facility] [place]-complete   [facility] - [place] - Interview with [facility] [facility] tomorrow 5/25 at 12:00PM via teams  Current Symptoms include the following: See RN note     Addressed patient needs/concerns: See above intervention    Discharge Plan or Goal   Plan  TBD likely discharge to an IRTS     Commitment  Commited and Jarvised 05/02/2023-11/02/2023  28-FY-OQ-    Care Team   No outpatient care team   Father - Gokul Mccoy ([phone])  Civil commitment - Fátima Nunez- [phone]     Barriers to Discharge   Ongoing stabilization  Committed and Jarvised      Referral Status  PT would benefit from  such as case management, psychiatry, OP programming. No referrals have been made as of yet.      Legal Status  Committed and Jarvised

## 2023-05-24 NOTE — CARE PLAN
OT PROGRESS NOTE:     05/23/23 1419   Group Therapy Session   Group Attendance attended group session   Time Session Began 1115   Time Session Ended 1200   Total Time (minutes) 45   Total # Attendees 4   Group Type recreation;task skill   Group Topic Covered balanced lifestyle;cognitive activities;leisure exploration/use of leisure time;problem-solving   Group Session Detail Dizzios /cognitive activity to promote visiospatial awareness, problem solving, planning, organization, social interaction and exploration of positive coping/life balance activity for improved self management.   Patient Response/Contribution cooperative with task;disorganized;left the group on several occasions;other (see comments)  (distractable)   Patient Participation Detail Initiated group. Disappointed when activity was not familiar to him. Had difficulty with spatial relations part of activity. Negative and irritated throughout. After 40 minutes got up and left. Preoccupied with getting SNAP. Asked questions repeatedly even though he was told to ask his  as I did not have the answers to his questions.

## 2023-05-24 NOTE — PROGRESS NOTES
[facility],  Psychiatric Progress Note      Impression:     Alexander Mccoy is a 38-year-old male admitted to [address] on 4/22/2023.  He was admitted on a court hold from the [facility] ER due to psychosis.  He stated that he went to a police station because he believed undercover governmental agents were tracking him.  He said he no longer has a job because the government agents had infiltrated through his phone and computer.  He became involved in an altercation with the  at the [facility].  UTOX was negative and he denies substance abuse.  He was not taking any psychotropic medications prior to admission.  While in the ER, he was intermittently agitated.  He called 911.  During his time in the ER, a petition for commitment MI with [facility] was filed in [facility], and he was placed on a court hold.  He has type 2 diabetes.  HbA1c was 10.9.  He reports taking Metformin XR prior to admission.  While in the ER, he was inconsistently adherent to insulin and BG checks.  During the admission assessment, he reports rarely checking his blood glucose and states that his BG never exceeds 200, however BGs have often been over 300 since he was in the ER, which he relates to stress.  He says if he were not in the hospital, they would be lower.   He was not taking any psychotropic medications prior to admission.  Abilify was initiated in the ER, but he refused the second dose.  He states he will not take psychotropic medications nor consent to labs or insulin while hospitalized.  Throughout the conversation, he rarely responded directly to provider's inquiries and instead made comments about being tracked and the injustices of being hospitalized.  Since admission, Abilify was increased, and he refused 3/4 doses.  Abilify was then discontinued and replaced with Haldol; he took 2 doses and then refused the rest until Kristofer was  enacted.  Due to his complaints of side effects including drowsiness, Haldol was discontinued and replaced with Risperdal on 5/11.  PRNs of Melatonin, Hydroxyzine and Zyprexa were initiated.  He initially refused BG checks and insulin but has been adhering since the evening of 4/22 onward.   He is committed and Jarvised as of 5/2.  Upon receiving court paperwork, he kicked and punched the exit door and a behavioral code was called.  He subsequently was agreeable to taking medications and going to his room, so he was not placed in seclusion.  On 5/10 he pounded his fist on the desk, stating he wanted to discharge.  Symptoms have been improving since the change from Haldol to Risperdal.  Psychotic symptoms are significantly reduced.  He is no longer irritable.  Sleep is improved.  He appears to have some difficulty processing and retaining information during conversations, however scored 28/30 on the MoCA on 5/23, within normal range.          Diagnoses:     Unspecified psychosis, likely paranoid schizophrenia  Type 2 diabetes, insulin dependent  Low back pain with left-sided sciatica  Mobility on left lower molar         Plan:     Medications: Continue Risperdal 4 mg at HS with a back up of IM Haldol 5 mg per Kristofer.  Continue PRNs of Melatonin, Hydroxyzine and Zyprexa.     He is committed MI with [facility] (Abilify, Risperdal, Haldol, Zyprexa) in [facility].     Internal medicine signed off on diabetes management on 5/10.  He reports using FreeStyle Candi at home for blood glucose monitoring.  Diabetes education consult ordered 5/23 with request for list of supplies needed for consult prior to discharge.    First episode psychosis labs were unremarkable.     MoCA on 5/28 was 28/30, within normal range.    He lives with his parents but is not welcome to return; his parents do not want him to know this.  IRTS referrals are in process.  He prefers somewhere close to [place] that allows smoking.  Plan for ENT  "referral for tinnitus.         Attestation:  Patient has been seen and evaluated by me, CLARA Anthony CNP  The patient was counseled on nature of illness and treatment plan/options  Care was coordinated with treatment team  Total time > 35 minutes           Interim History:     The patient's care was discussed with the treatment team and chart notes were reviewed.  Pt was documented as sleeping 4.5 hours during the overnight shift.  He scored 28/30 on the MoCA.  BGs were 169 and 129.  He attended 3 groups.  During one group he mentioned his desire to apologize to his parents for statements he made when he was paranoid.  Pt reports that his loose molar is feeling better.  States that in spite of staff documentation, he felt that he slept well last night.  He said he slept from 10:30 PM until 5 AM,  During which time he was up to use the bathroom twice, and fell asleep quickly afterwards.  Pt denies any concerns regarding \"undercovers.\"  He said he is \"doing well\" overall.  Appetite is good.  He has been trying to limit himself to 20-30 carbs per meal.  Pt is eager to discharge to IRTS.  He said, \"I'm not picky\" and will take the first available opening.  He prefers no roommate, but if the difference in wait time is vastly different, he would rather go to an IRTS with sooner availability than wait for an IRTS at which he would not have a roommate.          Medications:     Current Facility-Administered Medications   Medication     acetaminophen (TYLENOL) tablet 650 mg     alum & mag hydroxide-simethicone (MAALOX) suspension 30 mL     benztropine (COGENTIN) tablet 1 mg     glucose gel 15-30 g    Or     dextrose 50 % injection 25-50 mL    Or     glucagon injection 1 mg     fluticasone (FLONASE) 50 MCG/ACT spray 1 spray     gabapentin (NEURONTIN) capsule 300 mg     risperiDONE (risperDAL) tablet 4 mg    Or     haloperidol lactate (HALDOL) injection 5 mg     hydrOXYzine (ATARAX) tablet 25 mg     ibuprofen " "(ADVIL/MOTRIN) tablet 600 mg     insulin glargine (LANTUS PEN) injection 15 Units     melatonin tablet 3 mg     menthol (Topical Analgesic) 2.5% (BENGAY VANISHING SCENT) 2.5 % topical gel     metFORMIN (GLUCOPHAGE XR) 24 hr tablet 2,000 mg     nicotine (NICORETTE) gum 2 mg     OLANZapine (zyPREXA) tablet 10 mg    Or     OLANZapine (zyPREXA) injection 10 mg     senna-docusate (SENOKOT-S/PERICOLACE) 8.6-50 MG per tablet 1 tablet     sodium chloride (OCEAN) 0.65 % nasal spray 1 spray             Allergies:   No Known Allergies         Psychiatric Examination:     /82   Pulse 86   Temp 96.9  F (36.1  C)   Resp 16   Wt 86.6 kg (191 lb)   SpO2 98%   BMI 31.78 kg/m      Appearance:  awake, alert, somewhat disheveled, dressed in scrubs  Attitude:  cooperative  Eye Contact:  good   Mood:  \"doing well\"  Affect:  normal range  Speech:  clear, coherent, normal volume   Psychomotor Behavior:  no evidence of tardive dyskinesia, dystonia, or tics  Thought Process:  minimally disorganized, mostly logical  Associations:  no loose associations  Thought Content:  denies suicidal and homicidal ideation, paranoid delusions are significantly reduced compared to admission, denies hallucinations  Insight:  partial  Judgment:  fair  Oriented to:  date, time, person, and place  Attention Span and Concentration:  fair, improved  Recent and Remote Memory:  some short-term impairment, improving  Language:  intact, fluent English  Fund of Knowledge:  appropriate  Muscle Strength and Tone:  normal  Gait and Station:  normal          Labs:     Recent Results (from the past 24 hour(s))   Glucose by meter    Collection Time: 05/23/23  5:02 PM   Result Value Ref Range    GLUCOSE BY METER POCT 129 (H) 70 - 99 mg/dL   Glucose by meter    Collection Time: 05/24/23  8:18 AM   Result Value Ref Range    GLUCOSE BY METER POCT 127 (H) 70 - 99 mg/dL     "

## 2023-05-25 LAB
GLUCOSE BLDC GLUCOMTR-MCNC: 150 MG/DL (ref 70–99)
GLUCOSE BLDC GLUCOMTR-MCNC: 157 MG/DL (ref 70–99)

## 2023-05-25 PROCEDURE — 99232 SBSQ HOSP IP/OBS MODERATE 35: CPT | Performed by: NURSE PRACTITIONER

## 2023-05-25 PROCEDURE — 250N000013 HC RX MED GY IP 250 OP 250 PS 637: Performed by: NURSE PRACTITIONER

## 2023-05-25 PROCEDURE — H2032 ACTIVITY THERAPY, PER 15 MIN: HCPCS

## 2023-05-25 PROCEDURE — G0177 OPPS/PHP; TRAIN & EDUC SERV: HCPCS

## 2023-05-25 PROCEDURE — 124N000002 HC R&B MH UMMC

## 2023-05-25 RX ORDER — GABAPENTIN 300 MG/1
300 CAPSULE ORAL 3 TIMES DAILY PRN
Qty: 90 CAPSULE | Refills: 1 | Status: SHIPPED | OUTPATIENT
Start: 2023-05-25

## 2023-05-25 RX ORDER — LANOLIN ALCOHOL/MO/W.PET/CERES
3 CREAM (GRAM) TOPICAL
Qty: 30 TABLET | Refills: 1 | Status: SHIPPED | OUTPATIENT
Start: 2023-05-25

## 2023-05-25 RX ORDER — RISPERIDONE 4 MG/1
4 TABLET ORAL EVERY EVENING
Qty: 30 TABLET | Refills: 1 | Status: SHIPPED | OUTPATIENT
Start: 2023-05-25

## 2023-05-25 RX ORDER — FLUTICASONE PROPIONATE 50 MCG
1 SPRAY, SUSPENSION (ML) NASAL 2 TIMES DAILY PRN
Qty: 18.2 ML | Refills: 1 | Status: SHIPPED | OUTPATIENT
Start: 2023-05-25 | End: 2023-06-05

## 2023-05-25 RX ORDER — IBUPROFEN 600 MG/1
600 TABLET, FILM COATED ORAL EVERY 6 HOURS PRN
Qty: 100 TABLET | Refills: 1 | Status: SHIPPED | OUTPATIENT
Start: 2023-05-25

## 2023-05-25 RX ORDER — METFORMIN HYDROCHLORIDE EXTENDED-RELEASE TABLETS 1000 MG/1
2000 TABLET, FILM COATED, EXTENDED RELEASE ORAL EVERY MORNING
Qty: 60 TABLET | Refills: 1 | Status: SHIPPED | OUTPATIENT
Start: 2023-05-25 | End: 2023-06-05

## 2023-05-25 RX ORDER — CONTAINER,EMPTY
1 EACH MISCELLANEOUS DAILY
Qty: 1 EACH | Refills: 0 | Status: SHIPPED | OUTPATIENT
Start: 2023-05-25

## 2023-05-25 RX ORDER — HYDROXYZINE HYDROCHLORIDE 25 MG/1
25 TABLET, FILM COATED ORAL EVERY 4 HOURS PRN
Qty: 60 TABLET | Refills: 1 | Status: SHIPPED | OUTPATIENT
Start: 2023-05-25

## 2023-05-25 RX ADMIN — INSULIN GLARGINE 15 UNITS: 100 INJECTION, SOLUTION SUBCUTANEOUS at 08:10

## 2023-05-25 RX ADMIN — FLUTICASONE PROPIONATE 1 SPRAY: 50 SPRAY, METERED NASAL at 08:23

## 2023-05-25 RX ADMIN — RISPERIDONE 4 MG: 4 TABLET ORAL at 21:15

## 2023-05-25 RX ADMIN — METFORMIN ER 500 MG 2000 MG: 500 TABLET ORAL at 08:19

## 2023-05-25 ASSESSMENT — ACTIVITIES OF DAILY LIVING (ADL)
DRESS: INDEPENDENT
ADLS_ACUITY_SCORE: 18
HYGIENE/GROOMING: INDEPENDENT
ADLS_ACUITY_SCORE: 18
LAUNDRY: WITH SUPERVISION
ORAL_HYGIENE: INDEPENDENT
ADLS_ACUITY_SCORE: 18
ADLS_ACUITY_SCORE: 18

## 2023-05-25 NOTE — PLAN OF CARE
Problem: Suicidal Behavior  Goal: Suicidal Behavior is Absent or Managed  Outcome: Progressing     Problem: Psychotic Signs/Symptoms  Goal: Improved Behavioral Control (Psychotic Signs/Symptoms)  Outcome: Progressing   Goal Outcome Evaluation:       Pt is in the milieu, socializing with peers, he denies  pain and all mental health symptoms and contracted for safety. He was concerned about his BS being heigh because he  says that he did not get insulin this morning. BS levels this evening 133. Ate adequately for dinner, attended group and refused his nasal spray.

## 2023-05-25 NOTE — PLAN OF CARE
"  Problem: Psychotic Signs/Symptoms  Goal: Optimal Cognitive Function (Psychotic Signs/Symptoms)  Outcome: Progressing   Goal Outcome Evaluation:     Plan of Care Reviewed With: patient     \"I'm better.\" Patient states he feels better physically, mentally and emotionally. Denies depression and anxiety. Attends groups and is social with peers. Patient said he didn't receive his insulin yesterday, \"I didn't feel anything.\"  Met with Diabetic Educator this afternoon. Patient said it went well.            "

## 2023-05-25 NOTE — PLAN OF CARE
"  Problem: Psychotic Signs/Symptoms  Goal: Improved Behavioral Control (Psychotic Signs/Symptoms)  Outcome: Progressing   Goal Outcome Evaluation:         Pt is in the milieu socializing with peers. Denies  Pain and all mental health symptoms and states \"im not psychotic\" Cooperative and medication compliant, no cheeking observed during medication administration. He has been accepted at [facility]  but no date for discharge yet. . Ate adequately for dinner and attended several groups this shift. No PRN this shift. Will continue to monitor pt               "

## 2023-05-25 NOTE — PROGRESS NOTES
[facility],  Psychiatric Progress Note      Impression:     Alexander Mccoy is a 38-year-old male admitted to [address] on 4/22/2023.  He was admitted on a court hold from the [facility] ER due to psychosis.  He stated that he went to a police station because he believed undercover governmental agents were tracking him.  He said he no longer has a job because the government agents had infiltrated through his phone and computer.  He became involved in an altercation with the  at the [facility].  UTOX was negative and he denies substance abuse.  He was not taking any psychotropic medications prior to admission.  While in the ER, he was intermittently agitated.  He called 911.  During his time in the ER, a petition for commitment MI with [facility] was filed in [facility], and he was placed on a court hold.  He has type 2 diabetes.  HbA1c was 10.9.  He reports taking Metformin XR prior to admission.  While in the ER, he was inconsistently adherent to insulin and BG checks.  During the admission assessment, he reports rarely checking his blood glucose and states that his BG never exceeds 200, however BGs have often been over 300 since he was in the ER, which he relates to stress.  He says if he were not in the hospital, they would be lower.   He was not taking any psychotropic medications prior to admission.  Abilify was initiated in the ER, but he refused the second dose.  He states he will not take psychotropic medications nor consent to labs or insulin while hospitalized.  Throughout the conversation, he rarely responded directly to provider's inquiries and instead made comments about being tracked and the injustices of being hospitalized.  Since admission, Abilify was increased, and he refused 3/4 doses.  Abilify was then discontinued and replaced with Haldol; he took 2 doses and then refused the rest until Kristofer was  enacted.  Due to his complaints of side effects including drowsiness, Haldol was discontinued and replaced with Risperdal on 5/11.  PRNs of Melatonin, Hydroxyzine and Zyprexa were initiated.  He initially refused BG checks and insulin but has been adhering since the evening of 4/22 onward.   He is committed and Jarvised as of 5/2.  Upon receiving court paperwork, he kicked and punched the exit door and a behavioral code was called.  He subsequently was agreeable to taking medications and going to his room, so he was not placed in seclusion.  On 5/10 he pounded his fist on the desk, stating he wanted to discharge.  Symptoms have been improving since the change from Haldol to Risperdal.  Psychotic symptoms are significantly reduced.  He is no longer irritable.  Sleep is improved.  He appears to have some difficulty processing and retaining information during conversations, however scored 28/30 on the MoCA on 5/23, within normal range.          Diagnoses:     Unspecified psychosis, likely paranoid schizophrenia  Type 2 diabetes, insulin dependent  Low back pain with left-sided sciatica  Mobility on left lower molar         Plan:     Medications: Continue Risperdal 4 mg at HS with a back up of IM Haldol 5 mg per Kristofer.  Continue PRNs of Melatonin, Hydroxyzine and Zyprexa.  Discharge meds and diabetes supplies ordered on 5/25.      He is committed MI with [facility] (Abilify, Risperdal, Haldol, Zyprexa) in [facility].     Internal medicine signed off on diabetes management on 5/10.  He reports using FreeStyle Candi at home for blood glucose monitoring.  Diabetes education consult ordered 5/23 and plan to see him today.    First episode psychosis labs were unremarkable.     MoCA on 5/28 was 28/30, within normal range.    He lives with his parents but is not welcome to return; his parents do not want him to know this.  IRTS referrals are in process.  He prefers somewhere close to [place] that allows smoking.  He had  "an interview with [facility] today, and they reportedly have immediate openings.  Plan for ENT referral for tinnitus.         Attestation:  Patient has been seen and evaluated by me, CLARA Anthony CNP  The patient was counseled on nature of illness and treatment plan/options  Care was coordinated with treatment team  Total time > 35 minutes           Interim History:     The patient's care was discussed with the treatment team and chart notes were reviewed.  Pt was documented as sleeping 7 hours during the overnight shift.  He attended groups yesterday.  BGs were 127 and 133.  He has been calm and cooperative.  He has been refusing Flonase and asked that it be changed to PRN upon discharge.  Reviewed and ordered discharge meds today, since he has an interview with [facility] and they reportedly have immediate openings.  Pt has made some improvements in his ability to process and recall information over the past week.  Pt states his mood is \"good.\"  He feels calm.  States he is not concerned about \"undercovers.\"  Tolerating Risperdal well.  Diabetes education consult expected this afternoon.           Medications:     Current Facility-Administered Medications   Medication     acetaminophen (TYLENOL) tablet 650 mg     alum & mag hydroxide-simethicone (MAALOX) suspension 30 mL     benztropine (COGENTIN) tablet 1 mg     glucose gel 15-30 g    Or     dextrose 50 % injection 25-50 mL    Or     glucagon injection 1 mg     fluticasone (FLONASE) 50 MCG/ACT spray 1 spray     gabapentin (NEURONTIN) capsule 300 mg     risperiDONE (risperDAL) tablet 4 mg    Or     haloperidol lactate (HALDOL) injection 5 mg     hydrOXYzine (ATARAX) tablet 25 mg     ibuprofen (ADVIL/MOTRIN) tablet 600 mg     insulin glargine (LANTUS PEN) injection 15 Units     melatonin tablet 3 mg     menthol (Topical Analgesic) 2.5% (BENGAY VANISHING SCENT) 2.5 % topical gel     metFORMIN (GLUCOPHAGE XR) 24 hr tablet 2,000 mg     " "nicotine (NICORETTE) gum 2 mg     OLANZapine (zyPREXA) tablet 10 mg    Or     OLANZapine (zyPREXA) injection 10 mg     senna-docusate (SENOKOT-S/PERICOLACE) 8.6-50 MG per tablet 1 tablet     sodium chloride (OCEAN) 0.65 % nasal spray 1 spray             Allergies:   No Known Allergies         Psychiatric Examination:     /74   Pulse 95   Temp (!) 96.7  F (35.9  C)   Resp 16   Wt 86.6 kg (191 lb)   SpO2 98%   BMI 31.78 kg/m      Appearance:  awake, alert, fair grooming, dressed in scrubs  Attitude:  cooperative  Eye Contact:  good   Mood:  \"good\"  Affect:  normal range  Speech:  clear, coherent, normal volume   Psychomotor Behavior:  no evidence of tardive dyskinesia, dystonia, or tics  Thought Process:  minimally disorganized, mostly logical  Associations:  no loose associations  Thought Content:  denies suicidal and homicidal ideation, paranoid delusions are significantly reduced compared to admission, denies hallucinations  Insight:  partial  Judgment:  fair  Oriented to:  date, time, person, and place  Attention Span and Concentration:  fair, improved  Recent and Remote Memory:  fair, improved  Language:  intact, fluent English  Fund of Knowledge:  appropriate  Muscle Strength and Tone:  normal  Gait and Station:  normal          Labs:     Recent Results (from the past 24 hour(s))   Glucose by meter    Collection Time: 05/24/23  4:24 PM   Result Value Ref Range    GLUCOSE BY METER POCT 133 (H) 70 - 99 mg/dL   Glucose by meter    Collection Time: 05/25/23  8:04 AM   Result Value Ref Range    GLUCOSE BY METER POCT 157 (H) 70 - 99 mg/dL     "

## 2023-05-25 NOTE — CONSULTS
Diabetes Educator consult received for Alexander Mccoy, age 38, admitted to [address] on 2023.  He was admitted on a court hold from the [facility] ER due to psychosis. He is committed MI with [facility] (Abilify, Risperdal, Haldol, Zyprexa) in [facility].  PMH is significant for unspecified psychosis, likely paranoid schizophrenia, type 2 DM insulin requiring, fatty infiltration of liver, asthma in childhood, low back pain with left-sided sciatica, and mobility on left lower molar.    Alexander and the medical team are preparing for him to discharge to an TS soon.  Internal medicine was consulted this stay for diabetes management and they signed off on 5/10/23.  Glycosylated hemoglobin A1C 10.9% 23.  Glucose values indicate significant improvement in overall glycemic management.   Latest Reference Range & Units 23 08:39 23 16:31 23 08:27 23 16:42 23 08:18 23 17:02 23 08:18 23 16:24 23 08:04   GLUCOSE BY METER POCT 70 - 99 mg/dL 139 (H) 126 (H) 163 (H) 156 (H) 169 (H) 129 (H) 127 (H) 133 (H) 157 (H)   (H): Data is abnormally high    PTA utilizing a Freestyle Candi 2 CGM and was on Metformin  mg to titrate up to 1000 mg po BID over 4 weeks.    Currently on:  Lantus 15 units daily in am  Metformin XR 2000 mg daily in am    Per team, patient will discharge on Lantus and Metformin.  Will need for discharge:   Freestyle Candi 2 reader   Freestyle Candi 2 sensors   Lantus Solostar insulin pens   B-D 4 mm tracy pen needles   Metformin XR per MD  Alcohol Wipes     Met with patient in conference room on the unit.  Provided education on the followin.  Glucose monitoring-Patient has utilized the Freestyle Candi 2 in the past.  Received reader and sensors from discharge pharmacy.  Removed reader and patient set it up independently correctly.  Then reviewed with him how to apply the sensor.  Alexander is choosing not to place  the CGM on today.  He will wait until he discharges.  Each sensor good for 14 days so he wishes to wait since he will still need a fingerstick in the hospital.  He knows how to prepare sensor and then where to apply it on back of his arm.  2.  Action, peak, dosage, duration of Lantus (glargine) insulin.  Discussed how it is a once a day insulin lasting 24 hours and his current dose is 15 units.  3.  Demonstrated insulin administration with a training pen/injection pad/4 mm tracy pen needle.  Identified injection sites and need for rotation.  Alexander was able to return demonstration of an injection via training pen into injection pad.  He expressed concern about injecting in his abdomen, worried it would hurt more.  Informed him it should feel no different than an injection in his arm of outer upper leg.  Reviewed storage of insulin.  4.  Discussed action/dose of Metformin XR.  5.  Signs/symptoms/causes/treatment of hypoglycemia discussed in detail.  Rule of 15.  Patient states his dad had diabetes and he used to see his dad with lows and treat them.  Alexander says the lowest he can remember ever being was 60 mg/dL and felt no differently.  Stressed need to pay attention to symptoms and treat less than 70 mg/dL.  6.  Resources provided included Understanding Diabetes Basics, B-D Insulin Pen injection with pen needles; Site Selection and Rotation.    All items received from discharge pharmacy are in the med room on the PCU.  KEILA Espinal, informed of completion of education.    CLARA Bowden  Diabetes Clinical Nurse Specialist/[facility]  [phone]

## 2023-05-25 NOTE — CARE PLAN
"Occupational Therapy Group Note:     05/25/23 1621   Group Therapy Session   Group Attendance attended group session   Time Session Began 1115   Time Session Ended 1205   Total Time (minutes) 50   Total # Attendees 7   Group Type psychoeducation   Group Topic Covered balanced lifestyle;emotions/expression;structured socialization   Group Session Detail OT Topic Group: Strengths   Patient Response/Contribution confronted peers appropriately;cooperative with task;listened actively   Patient Participation Detail Patient engaged in a topic/discussion group with the focus being strengths exploration and identification. Patient was encouraged to identify and utilize strengths intentionally and purposefully in everyday life in order to increase self-esteem, boost mood, and reduce stress. Patient demonstrated active listening throughout group. Patient was complimented from a peer on being \"very nice to everyone\" and patient seemed to have difficulty accepting this compliment. Patient was an active verbal contributor to group discussion. Patient was respectful when listening to peer's responses and responded positively. Patient's responses were appropriate and insightful. Patient was able to identify things he is good at (kind, card throwing, and running fast), how he has helped others, challenges he has overcome (this program, social anxiety, and public speaking), and things that make him unique (listening, observant, giving advice). Positive and pleasant participant.        "

## 2023-05-25 NOTE — CONSULTS
Consulted by Thania Hercules to run a test claim for Blood Glucose Meter/Supplies, Glargine Insulin, Rapid Analog Insulin, & Metformin.    Patient has pharmacy benefits through [employer] (pre-paid medical assistance plan). Per insurance, the following are covered and preferred under the patient's plans:       Accu-chek meter/supplies - $0    Contour meter/supplies - $0    Lantus pens/vials - $3    Novolog pens/vials - $3    Insulin Aspart pens/vials - $3    Humalog pens/vials - $3    Insulin Lispro pens/vials - $3    Metformin IR/ER tabs - $1     The following are not covered:    OneTouch meter/supplies    Basaglar    Semglee    Fiasp    Admelog    Lyumjev    Metformin ER *osmotic* tabs      Please feel free to contact me with any other test claims, prior authorizations, or insurance questions regarding outpatient medications.     Thanks!      Rocio Eisenberg CP[facility] Discharge Pharmacy Liaison  Pronouns: She/Her/Hers    [facility]  [address] [place] [address] [address] [place] [address]   [email or web address]  [email or web address]   Phone: [phone]  Pager: [phone]  Fax: [phone]

## 2023-05-25 NOTE — CARE PLAN
Occupational Therapy Group Note:     05/25/23 1547   Group Therapy Session   Group Attendance attended group session   Time Session Began 1015   Time Session Ended 1110   Total Time (minutes) 25 (no charge)   Total # Attendees 5   Group Type task skill   Group Topic Covered coping skills/lifestyle management;emotions/expression;leisure exploration/use of leisure time;structured socialization;cognitive activities   Group Session Detail OT Clinic Group   Patient Response/Contribution confronted peers appropriately;cooperative with task   Patient Participation Detail Pt actively participated in occupational therapy clinic to facilitate coping skill exploration, creative expression within personally meaningful activities, and clinical observation of social, cognitive, and kinesthetic performance skills. Pt response: supervision to initiate, gather materials, sequence, and adjust to workspace demands as needed. Demonstrated good focus, planning, and problem solving for familiar word-search task. Able to ask for assistance as needed, and appropriate with peers and staff. Patient requested assist with locating one word in puzzle; writer inquired about patient's strategy to solve puzzle and patient was able to verbalize an appropriate strategy. Patient ended up finding word without help of writer and appeared pleased with his success. Affect: blunted. Mood: calm, cooperative, pleasant.

## 2023-05-25 NOTE — CARE PLAN
05/24/23 2100   Group Therapy Session   Group Attendance attended group session   Time Session Began 2000   Time Session Ended 2045   Total Time (minutes) 45   Total # Attendees 7-8   Group Type life skill   Group Topic Covered coping skills/lifestyle management;emotions/expression   Group Session Detail Inside/Outside   Patient Response/Contribution cooperative with task;discussed personal experience with topic   Patient Participation Detail See Note     Pt attended topic group independently and was cooperative.  Pt completed written task independently and was able to demonstrate fair insight into current issues.  Pt was appropriately social with peers and staff.   Pt demonstrated a good understanding of concepts reviewed and how to implement them into daily practice. Pt will continued to be encouraged to attend groups for ongoing assessment and to address goals identified on plan of care.

## 2023-05-26 LAB
GLUCOSE BLDC GLUCOMTR-MCNC: 151 MG/DL (ref 70–99)
GLUCOSE BLDC GLUCOMTR-MCNC: 152 MG/DL (ref 70–99)

## 2023-05-26 PROCEDURE — 99232 SBSQ HOSP IP/OBS MODERATE 35: CPT | Performed by: NURSE PRACTITIONER

## 2023-05-26 PROCEDURE — 250N000013 HC RX MED GY IP 250 OP 250 PS 637: Performed by: NURSE PRACTITIONER

## 2023-05-26 PROCEDURE — G0177 OPPS/PHP; TRAIN & EDUC SERV: HCPCS

## 2023-05-26 PROCEDURE — 90853 GROUP PSYCHOTHERAPY: CPT

## 2023-05-26 PROCEDURE — 124N000002 HC R&B MH UMMC

## 2023-05-26 RX ADMIN — RISPERIDONE 4 MG: 4 TABLET ORAL at 20:05

## 2023-05-26 RX ADMIN — INSULIN GLARGINE 15 UNITS: 100 INJECTION, SOLUTION SUBCUTANEOUS at 08:09

## 2023-05-26 RX ADMIN — METFORMIN ER 500 MG 2000 MG: 500 TABLET ORAL at 08:09

## 2023-05-26 RX ADMIN — FLUTICASONE PROPIONATE 1 SPRAY: 50 SPRAY, METERED NASAL at 08:10

## 2023-05-26 ASSESSMENT — ACTIVITIES OF DAILY LIVING (ADL)
ADLS_ACUITY_SCORE: 18

## 2023-05-26 NOTE — CARE PLAN
05/26/23 1533   Group Therapy Session   Group Attendance attended group session   Time Session Began 1115   Time Session Ended 1200   Total Time (minutes) 45   Total # Attendees 5   Group Type life skill;psychoeducation   Group Topic Covered coping skills/lifestyle management;self-care activities   Group Session Detail General Health and Coping Skills: education and group discussion on self-esteem/self-compassion.   Patient Participation Detail Pt participated in a group discussion on self-esteem and self-compassion. Pt was open to sharing thoughts and perspective with the group to answer the discussion questions. Writer observed pt frequently fall in and out of sleep.

## 2023-05-26 NOTE — PROGRESS NOTES
CLINICAL NUTRITION SERVICES    Reason for Assessment:  Nutrition education regarding carb counting prior to discharge.     Diet History:  Pt has had some previous CHO and diabetes education in the past. Patient was seen by Endocrine 5/25 to discuss discharge regimen. Discharging on Lantus and metformin.     Nutrition Diagnosis:  Food- and nutrition-related knowledge deficit r/t no previous knowledge of carb counting AEB pt report of not having received carb counting nutrition education in the past.      Interventions:  Nutrition Education:Survival Information  1)  Provided verbal and written nutrition on diabetes meal planning and importance of consistent carbohydrate intake to optimize glycemic control.  2)  Provided verbal and written nutrition education on eating carbohydrates consistently throughout the day.  Gave various examples.  Discussed that though patient does not need to count carbohydrates to dose insulin, being aware of carbohydrate containing foods and eating them consistently throughout the day will loreta better blood glucose control.   3)  Handouts provided:  Carbohydrate education handout, Carb Counting handout,     Goals:   Patient will verbalize understanding of what foods contain carbohydrates, what foods do not, and how to eat carbohydrates consistently throughout the day.     Follow-up:    Patient to ask any further nutrition-related questions before discharge.  In addition, pt may request outpatient RD appointment.    Mojgan Weir, MPH, RD, LD  Pediatric & Adult Behavioral Health Dietitian   Pager: [phone]  Weekend/holiday pager: [phone]

## 2023-05-26 NOTE — CARE PLAN
05/26/23 1439   Group Therapy Session   Group Attendance attended group session   Time Session Began 1015   Time Session Ended 1100   Total Time (minutes) 45   Total # Attendees 6   Group Type recreation;task skill   Group Topic Covered balanced lifestyle;leisure exploration/use of leisure time;cognitive activities   Group Session Detail OT Clinic: open group activities to promote self-expression and leisure exploration while working to demonstrate cognitive skills   Patient Participation Detail Pt participated in open OT group and worked on a word search puzzle. Pt asked writer where to find word search worksheets; writer assisted pt with set up and retrieving supplies. Pt completed the word search and sat quietly, observing other group members. Pt asked to work on a scratch art project, observing another patient work on scratch art. Writer assisted pt with finding supplies. Pt mentioned they wanted someone to do a project with them, expressing to a patient that they were not interested in drawing. Pt abandoned the project, said they did not want to work on a scratch art project, saying that they wanted to get the supplies for another patient because the other patient wanted to do the scratch art instead. Pt insisted on helping writer return supplies back to storage. Pt requires reminders  to be aware and mindful of others (writer was busy working to comfort a patient who was crying, pt interrupted; pt insisted another patient join them in scratch art, but the patient was working on a different project).

## 2023-05-26 NOTE — PROGRESS NOTES
[facility],  Psychiatric Progress Note      Impression:     Alexander Mccoy is a 38-year-old male admitted to [address] on 4/22/2023.  He was admitted on a court hold from the [facility] ER due to psychosis.  He stated that he went to a police station because he believed undercover governmental agents were tracking him.  He said he no longer has a job because the government agents had infiltrated through his phone and computer.  He became involved in an altercation with the  at the [facility].  UTOX was negative and he denies substance abuse.  He was not taking any psychotropic medications prior to admission.  While in the ER, he was intermittently agitated.  He called 911.  During his time in the ER, a petition for commitment MI with [facility] was filed in [facility], and he was placed on a court hold.  He has type 2 diabetes.  HbA1c was 10.9.  He reports taking Metformin XR prior to admission.  While in the ER, he was inconsistently adherent to insulin and BG checks.  During the admission assessment, he reports rarely checking his blood glucose and states that his BG never exceeds 200, however BGs have often been over 300 since he was in the ER, which he relates to stress.  He says if he were not in the hospital, they would be lower.   He was not taking any psychotropic medications prior to admission.  Abilify was initiated in the ER, but he refused the second dose.  He states he will not take psychotropic medications nor consent to labs or insulin while hospitalized.  Throughout the conversation, he rarely responded directly to provider's inquiries and instead made comments about being tracked and the injustices of being hospitalized.  Since admission, Abilify was increased, and he refused 3/4 doses.  Abilify was then discontinued and replaced with Haldol; he took 2 doses and then refused the rest until [facility] was  enacted.  Due to his complaints of side effects including drowsiness, Haldol was discontinued and replaced with Risperdal on 5/11.  PRNs of Melatonin, Hydroxyzine and Zyprexa were initiated.  He initially refused BG checks and insulin but has been adhering since the evening of 4/22 onward.   He is committed and [facility] as of 5/2.  Upon receiving court paperwork, he kicked and punched the exit door and a behavioral code was called.  He subsequently was agreeable to taking medications and going to his room, so he was not placed in seclusion.  On 5/10 he pounded his fist on the desk, stating he wanted to discharge.  Symptoms have been improving since the change from Haldol to Risperdal.  Psychotic symptoms are significantly reduced.  He is no longer irritable.  Sleep is improved.  He appears to have some difficulty processing and retaining information during conversations, however scored 28/30 on the MoCA on 5/23, within normal range.          Diagnoses:     Unspecified psychosis, likely paranoid schizophrenia  Type 2 diabetes, insulin dependent  Low back pain with left-sided sciatica  Mobility on left lower molar  Tinnitus          Plan:     Medications: Continue Risperdal 4 mg at HS with a back up of IM Haldol 5 mg per [facility].  Continue PRNs of Melatonin, Hydroxyzine and Zyprexa.  Discharge meds and diabetes supplies ordered on 5/25.      He is committed MI with [facility] (Abilify, Risperdal, Haldol, Zyprexa) in [facility].     Internal medicine signed off on diabetes management on 5/10.  He reports using FreeStyle Candi at home for blood glucose monitoring.  Diabetes education consult completed 5/25.    First episode psychosis labs were unremarkable.     MoCA on 5/23 was 28/30, within normal range.    Outpatient ENT referral for tinnitus.     He lives with his parents but is not welcome to return; his parents do not want him to know this.  IRTS referrals are in process.  He prefers somewhere close to [place]  "that allows smoking.  He had an interview with [facility] on 5/25 and was accepted.  Provider completed admission paperwork on 5/26.   Plan to discharge to [facility] when they are able to accommodate him.           Attestation:  Patient has been seen and evaluated by me, CLARA Anthony CNP  The patient was counseled on nature of illness and treatment plan/options  Care was coordinated with treatment team  Total time > 35 minutes           Interim History:     The patient's care was discussed with the treatment team and chart notes were reviewed.  Pt was documented as sleeping 5.5 hours during the overnight shift.  He attended several groups, with good focus.  He refused 1 dose of Flonase.  He otherwise took medications as prescribed.  BGs were 157 and 150 yesterday.  Pt has been making an effort to limit his carbs, and BGs are improved.  Diabetes education consult completed yesterday.  He said the interview with [facility] went well.  He was accepted.  They reportedly have beds available but have yet to determine admission date.  Discharge meds have been ordered.  Completed [facility] admission paperwork today.  Outpatient ENT referral also completed.  Pt reports his mood is \"good.\"  He said that if he sees \"undercovers\" or \"triple digit license plates\" after discharge \"I think it's just a coincidence.  I would just ignore it.\"  He continues to have a bit of difficulty processing information during conversations, though MoCA was 28/30.  Tolerating Risperdal well and reports it has a calming effect.           Medications:     Current Facility-Administered Medications   Medication     acetaminophen (TYLENOL) tablet 650 mg     alum & mag hydroxide-simethicone (MAALOX) suspension 30 mL     benztropine (COGENTIN) tablet 1 mg     glucose gel 15-30 g    Or     dextrose 50 % injection 25-50 mL    Or     glucagon injection 1 mg     fluticasone (FLONASE) 50 MCG/ACT spray 1 spray     " "gabapentin (NEURONTIN) capsule 300 mg     risperiDONE (risperDAL) tablet 4 mg    Or     haloperidol lactate (HALDOL) injection 5 mg     hydrOXYzine (ATARAX) tablet 25 mg     ibuprofen (ADVIL/MOTRIN) tablet 600 mg     insulin glargine (LANTUS PEN) injection 15 Units     melatonin tablet 3 mg     menthol (Topical Analgesic) 2.5% (BENGAY VANISHING SCENT) 2.5 % topical gel     metFORMIN (GLUCOPHAGE XR) 24 hr tablet 2,000 mg     nicotine (NICORETTE) gum 2 mg     OLANZapine (zyPREXA) tablet 10 mg    Or     OLANZapine (zyPREXA) injection 10 mg     senna-docusate (SENOKOT-S/PERICOLACE) 8.6-50 MG per tablet 1 tablet     sodium chloride (OCEAN) 0.65 % nasal spray 1 spray             Allergies:   No Known Allergies         Psychiatric Examination:     /76 (BP Location: Left arm, Patient Position: Sitting, Cuff Size: Adult Regular)   Pulse 89   Temp 97.8  F (36.6  C) (Temporal)   Resp 16   Wt 86.6 kg (191 lb)   SpO2 97%   BMI 31.78 kg/m      Appearance:  awake, alert, fair grooming, dressed in scrubs  Attitude:  cooperative  Eye Contact:  good   Mood:  \"good\"  Affect:  normal range  Speech:  clear, coherent, normal volume   Psychomotor Behavior:  no evidence of tardive dyskinesia, dystonia, or tics  Thought Process:  minimally disorganized, mostly logical  Associations:  no loose associations  Thought Content:  denies suicidal and homicidal ideation, paranoid delusions are significantly reduced compared to admission, denies hallucinations  Insight:  partial  Judgment:  fair  Oriented to:  date, time, person, and place  Attention Span and Concentration:  fair, improved  Recent and Remote Memory:  fair, improved  Language:  intact, fluent English  Fund of Knowledge:  appropriate  Muscle Strength and Tone:  normal  Gait and Station:  normal          Labs:     Recent Results (from the past 24 hour(s))   Glucose by meter    Collection Time: 05/25/23  5:16 PM   Result Value Ref Range    GLUCOSE BY METER POCT 150 (H) 70 - " 99 mg/dL   Glucose by meter    Collection Time: 05/26/23  8:02 AM   Result Value Ref Range    GLUCOSE BY METER POCT 151 (H) 70 - 99 mg/dL

## 2023-05-26 NOTE — PROGRESS NOTES
"  05/25/23 2100   Group Therapy Session   Time Session Began 2000   Time Session Ended 2100   Total Time (minutes) 55   Total # Attendees 9   Group Type expressive therapy   Group Topic Covered balanced lifestyle;emotions/expression;relaxation techniques   Group Session Detail Evening Relaxation   Patient Response/Contribution cooperative with task   Patient Participation Detail Cooperatively engaged in Evening Music Relaxation group to decrease anxiety and promote sleep.  Calm affect, appropriately engaged in session, responding well to the music.  At first stated he did not have any requests/input for the group, but at the end of session he piped up with a song that was very powerful for group process, \"Reckless Love\", a spiritual, azeb-based song.  Alexander became tearful during the song, reflecting an emotional release, which appeared to be safe and successful.        "

## 2023-05-27 LAB
GLUCOSE BLDC GLUCOMTR-MCNC: 115 MG/DL (ref 70–99)
GLUCOSE BLDC GLUCOMTR-MCNC: 151 MG/DL (ref 70–99)

## 2023-05-27 PROCEDURE — H2032 ACTIVITY THERAPY, PER 15 MIN: HCPCS

## 2023-05-27 PROCEDURE — 124N000002 HC R&B MH UMMC

## 2023-05-27 PROCEDURE — 250N000013 HC RX MED GY IP 250 OP 250 PS 637: Performed by: NURSE PRACTITIONER

## 2023-05-27 RX ADMIN — METFORMIN ER 500 MG 2000 MG: 500 TABLET ORAL at 08:11

## 2023-05-27 RX ADMIN — RISPERIDONE 4 MG: 4 TABLET ORAL at 20:21

## 2023-05-27 RX ADMIN — INSULIN GLARGINE 15 UNITS: 100 INJECTION, SOLUTION SUBCUTANEOUS at 08:11

## 2023-05-27 ASSESSMENT — ACTIVITIES OF DAILY LIVING (ADL)
ADLS_ACUITY_SCORE: 18
ADLS_ACUITY_SCORE: 18
HYGIENE/GROOMING: INDEPENDENT
ADLS_ACUITY_SCORE: 18
ORAL_HYGIENE: INDEPENDENT
ADLS_ACUITY_SCORE: 18
ADLS_ACUITY_SCORE: 18
DRESS: INDEPENDENT
ADLS_ACUITY_SCORE: 18
ADLS_ACUITY_SCORE: 18
LAUNDRY: WITH SUPERVISION

## 2023-05-27 NOTE — PLAN OF CARE
Problem: Psychotic Signs/Symptoms  Goal: Improved Mood Symptoms  Outcome: Progressing   Goal Outcome Evaluation:    Plan of Care Reviewed With: patient          Patient in the milieu at the start of shift. Calm, pleasant, cooperative with cares, medication compliant. He is sociable with peers and staff, takes his meals in the dining room, and participates in groups. He denies all mental health symptoms and contracts for safety. /78   Pulse 81   Temp 97.5  F (36.4  C) (Oral)   Resp 16   Wt 86.6 kg (191 lb)   SpO2 98%   BMI 31.78 kg/m

## 2023-05-27 NOTE — PLAN OF CARE
Pt was observed quietly resting/sleeping in bed most of the night.  He was awake and briefly approached the desk at approximately 4 am to look at the clock and check the time of day.  He offered no complaints and made no requests as he returned to rest in his room..  Approximately 6 hours of sleep is recorded tonight.

## 2023-05-27 NOTE — PLAN OF CARE
Problem: Depressive Symptoms  Goal: Social and Therapeutic (Depression)  Description: Signs and symptoms of listed problems will be absent or manageable.  Outcome: Progressing   Goal Outcome Evaluation:    Plan of Care Reviewed With: (P) patient             Patient in the milieu at the start of shift. Calm, pleasant, cooperative with cares, medication compliant. He is sociable with peers and staff, takes his meals in the dining room, and participates in groups. He denies all mental health symptoms and contracts for safety./79 (BP Location: Left arm)   Pulse 77   Temp 97.8  F (36.6  C) (Oral)   Resp 15   Wt 86.6 kg (191 lb)   SpO2 97%   BMI 31.78 kg/m

## 2023-05-27 NOTE — PROGRESS NOTES
05/26/23 2300   Group Therapy Session   Group Attendance attended group session   Time Session Began 2015   Time Session Ended 2110   Total Time (minutes) 55   Total # Attendees 5   Group Type psychotherapeutic   Group Topic Covered cognitive therapy techniques   Group Session Detail DBT house   Patient Response/Contribution cooperative with task;discussed personal experience with topic;expressed readiness to alter behaviors;listened actively     Pt is really looking forward to discharge next week and trying to stay patient he reports. He did a very thoughtful job on the DBT house and showed self awareness and insight about how he is a giving person , to a fault and wants to still be giving but work on self love and social anxiety.

## 2023-05-28 LAB
GLUCOSE BLDC GLUCOMTR-MCNC: 109 MG/DL (ref 70–99)
GLUCOSE BLDC GLUCOMTR-MCNC: 124 MG/DL (ref 70–99)

## 2023-05-28 PROCEDURE — 250N000013 HC RX MED GY IP 250 OP 250 PS 637: Performed by: NURSE PRACTITIONER

## 2023-05-28 PROCEDURE — G0177 OPPS/PHP; TRAIN & EDUC SERV: HCPCS

## 2023-05-28 PROCEDURE — 124N000002 HC R&B MH UMMC

## 2023-05-28 RX ADMIN — RISPERIDONE 4 MG: 4 TABLET ORAL at 20:30

## 2023-05-28 RX ADMIN — INSULIN GLARGINE 15 UNITS: 100 INJECTION, SOLUTION SUBCUTANEOUS at 08:39

## 2023-05-28 RX ADMIN — METFORMIN ER 500 MG 2000 MG: 500 TABLET ORAL at 08:09

## 2023-05-28 ASSESSMENT — ACTIVITIES OF DAILY LIVING (ADL)
ADLS_ACUITY_SCORE: 18
ORAL_HYGIENE: INDEPENDENT
ADLS_ACUITY_SCORE: 18
LAUNDRY: WITH SUPERVISION
HYGIENE/GROOMING: INDEPENDENT
ADLS_ACUITY_SCORE: 18
DRESS: INDEPENDENT

## 2023-05-28 NOTE — PLAN OF CARE
Goal Outcome Evaluation:    Plan of Care Reviewed With: patient      Patient was visible in the milieu for the majority of the shift, he spent most of his time walking in the halls listening to music or conversing with peers. He was polite and cooperative with staff and interacted appropriately with peers. Patient's affect was blunted but brightened upon approach. Patient's mood was calm. Patient denied all mental health concerns including SI/SIB/AVH/HI. He was medication compliant and received no PRN's. VSS, patient denied pain, BG was 115 before dinner. No behavioral concerns this shift.      Patient remains on cheeking precautions    /79   Pulse 78   Temp 98.4  F (36.9  C) (Oral)   Resp 15   Wt 88.2 kg (194 lb 6.4 oz)   SpO2 95%   BMI 32.35 kg/m    .

## 2023-05-28 NOTE — PROGRESS NOTES
05/28/23 1531   Group Therapy Session   Group Attendance attended group session   Time Session Began 1315   Time Session Ended 1400   Total Time (minutes) 45   Total # Attendees 3   Group Type recreation   Group Topic Covered coping skills/lifestyle management;leisure exploration/use of leisure time;cognitive activities   Group Session Detail Education provided on mental health benefits of healthy leisure and group discussion plus hands on Abhay game for concentration, attention to detail, strategy making, simple problem solving, organization/planning, tracking, healthy leisure, coping, mood stabilization, reality-based activity, building self-esteem and expanding social skills.   Patient Participation Detail Pt was pleasant and invested in the activity.  Pt had a poor attention span and out often need reminders when it was his turn.  Pt also had trouble recalling the rules of the game, and often tried to confabulate rules to his advantage despite being presented all of the rules at the beginning of the group.  Pt clearly needs repetition to absorb new information, especially if on more than one piece of information.  Pt would often ask a question about something that he was given information less than 5 seconds prior to his question.  As group progressed, he was more consistent with recalling some rules, though errors remained.  Pt did declare at the end of the group session that he enjoyed the group.

## 2023-05-28 NOTE — PLAN OF CARE
Pt appeared to be sleeping at the start of the night and remained quietly resting in bed as staff did rounds.  No complaints offered.  No PRN medications requested this shift.  He remains calm and cooperative when awake. Pt slept approximately 6 hours tonight.

## 2023-05-28 NOTE — PLAN OF CARE
Problem: Psychotic Signs/Symptoms  Goal: Optimal Cognitive Function (Psychotic Signs/Symptoms)  Outcome: Progressing   Goal Outcome Evaluation:    Plan of Care Reviewed With: patient          Patient in the milieu at the start of shift. Calm, pleasant, cooperative with cares, medication compliant. He is sociable with peers and staff, takes his meals in the dining room, and participates in groups. He denies all mental health symptoms and contracts for safety./78 (BP Location: Left arm, Patient Position: Sitting, Cuff Size: Adult Regular)   Pulse 101   Temp 97.1  F (36.2  C) (Tympanic)   Resp 16   Wt 87.1 kg (192 lb)   SpO2 97%   BMI 31.95 kg/m  .

## 2023-05-28 NOTE — CARE PLAN
05/27/23 2200   Group Therapy Session   Group Attendance attended group session   Time Session Began 2000   Time Session Ended 2100   Total Time (minutes) 60   Total # Attendees 5   Group Type expressive therapy   Group Topic Covered emotions/expression   Patient Response/Contribution cooperative with task     Art Therapy directive is to create artwork expressing a recent pleasant experience in which pt felt a positive emotion. Pt were also encouraged to create a Haiku poem to further express this experience/emotion.  Goals of directive: emotional expression, emotional regulation, mindfulness, DBT skills  Pt was a quiet observer in group today. Pt listened to music for relaxation while working on crossword puzzles and then actively contributed to group discussion at end of group and gave positive feedback to peers when they shared their artwork.  Pts mood was calm, pleasant participant.

## 2023-05-29 LAB
GLUCOSE BLDC GLUCOMTR-MCNC: 147 MG/DL (ref 70–99)
GLUCOSE BLDC GLUCOMTR-MCNC: 186 MG/DL (ref 70–99)

## 2023-05-29 PROCEDURE — H2032 ACTIVITY THERAPY, PER 15 MIN: HCPCS

## 2023-05-29 PROCEDURE — G0177 OPPS/PHP; TRAIN & EDUC SERV: HCPCS

## 2023-05-29 PROCEDURE — 250N000013 HC RX MED GY IP 250 OP 250 PS 637: Performed by: NURSE PRACTITIONER

## 2023-05-29 PROCEDURE — 124N000002 HC R&B MH UMMC

## 2023-05-29 RX ADMIN — METFORMIN ER 500 MG 2000 MG: 500 TABLET ORAL at 08:20

## 2023-05-29 RX ADMIN — INSULIN GLARGINE 15 UNITS: 100 INJECTION, SOLUTION SUBCUTANEOUS at 08:20

## 2023-05-29 RX ADMIN — GABAPENTIN 300 MG: 300 CAPSULE ORAL at 12:19

## 2023-05-29 RX ADMIN — RISPERIDONE 4 MG: 4 TABLET ORAL at 20:54

## 2023-05-29 ASSESSMENT — ACTIVITIES OF DAILY LIVING (ADL)
ADLS_ACUITY_SCORE: 18
ADLS_ACUITY_SCORE: 18
ORAL_HYGIENE: INDEPENDENT
ADLS_ACUITY_SCORE: 18
LAUNDRY: WITH SUPERVISION
ADLS_ACUITY_SCORE: 18
ADLS_ACUITY_SCORE: 18
ORAL_HYGIENE: INDEPENDENT
ADLS_ACUITY_SCORE: 18
DRESS: SCRUBS (BEHAVIORAL HEALTH);INDEPENDENT
ADLS_ACUITY_SCORE: 18
HYGIENE/GROOMING: INDEPENDENT
ADLS_ACUITY_SCORE: 28
HYGIENE/GROOMING: INDEPENDENT
DRESS: INDEPENDENT
ADLS_ACUITY_SCORE: 18

## 2023-05-29 NOTE — PLAN OF CARE
Problem: Anxiety Signs/Symptoms  Goal: Enhanced Social, Occupational or Functional Skills (Anxiety Signs/Symptoms)  Intervention: Promote Social, Occupational and Functional Ability  Recent Flowsheet Documentation  Taken 5/29/2023 1310 by Socorro Mcclain RN  Trust Relationship/Rapport:    care explained    choices provided     Pt was restless throughout the shift, pacing in the hallway and listening to headphones. He attended one of the scheduled OT groups. Pt ate meals and was compliant with medications except for his flonase. BG before breakfast was 157. Affect was flat, restricted. Mood was somewhat tense at times. During a behavioral code that was happening on the unit pt needed multiple reminders to stay in his room but he was redirectable each time. No SI/SIB noted or observed. Will continue to monitor.

## 2023-05-29 NOTE — PROGRESS NOTES
05/29/23 1514   Group Therapy Session   Group Attendance attended group session   Time Session Began 1015   Time Session Ended 1200   Total Time (minutes) 5   Total # Attendees 6   Group Type task skill   Group Session Detail Group discussion on what are the signs you see in yourself when you are recovering with hands on portion of making a Recovery Collage for concentration, organization, planning, creative expression, positive and future based thinking, decision making, follow through, mood stabilization, reality-based activity, building self-esteem, increasing self-awareness and insight, fostering hope for a sustainable recovery, and an opportunity for socialization.   Patient Participation Detail Pt remained in group only for the check in question.  He shared that he knows his mental health is improving when he is able to sleep better.  He left group shortly afterwards, declining to make a collage or even just peruse a magazine.  No charge.

## 2023-05-29 NOTE — PROGRESS NOTES
05/29/23 1532   Group Therapy Session   Group Attendance attended group session   Time Session Began 1315   Time Session Ended 1400   Total Time (minutes) 45   Total # Attendees 4   Group Type recreation   Group Session Detail Q-bitz activity for visual perception and dexterity, spatial relations, problem solving, follow through, concentration, attention to detail, coping, mood stabilization, reality-based activity, hands on meaningful activity, healthy leisure, socialization and an opportunity to experience success.   Patient Participation Detail Pt needed encouragement to try the activity as he wanted to quit prior to trying even one puzzle.  Pt begrudgingly agreed to try, but complained steadily throuhgout the group session.  Pt gave up at one point and was given additional encouragement by therapist and his peers.  Therapist gently inquired about his function in daily life when his default is to give up quickly and put forth very little effort.  Pt admitted that he doesn't get much done.  He handled the inquiry well and it was effective for motivating him to put forth more effort.  Pt was able to utilize the tips given by therapist and peers to solve the puzzles.  Pt did continue to grumble aloud and admitted that he was doing that as a means of teasing the therapist.  Pt's sense of humor was appreciated and his affect brightened significantly when disclosing this.  Pt's peers also appreciated the joke.

## 2023-05-29 NOTE — PLAN OF CARE
Pt appeared to be sleeping most of the night as staff observed him during safety rounds.  He did not voice any complaints and made no requests.  Pt approached the desk briefly to look at the clock observing time of day/night then returned to his room.  Approximately 6.5 hours of sleep is recorded.

## 2023-05-29 NOTE — PLAN OF CARE
"Goal Outcome Evaluation:    Plan of Care Reviewed With: patient      Patient was visible in the milieu for the majority of the shift, he spent most of his time walking in the halls listening to music. He was polite and cooperative with staff and interacted appropriately with peers. Patient's affect was blunted but brightened upon approach. Patient's mood was calm. Patient denied all mental health concerns including SI/SIB/AVH/HI, patient stated \"I'm just bored, I'm ready to get out of here and move on\". He was medication compliant and received no PRN's. VSS, patient denied pain, BG was 124 before dinner. No behavioral concerns this shift.      Patient remains on cheeking precautions.    /80   Pulse 81   Temp 97.1  F (36.2  C) (Tympanic)   Resp 16   Wt 87.1 kg (192 lb)   SpO2 98%   BMI 31.95 kg/m                 "

## 2023-05-30 LAB
GLUCOSE BLDC GLUCOMTR-MCNC: 152 MG/DL (ref 70–99)
GLUCOSE BLDC GLUCOMTR-MCNC: 174 MG/DL (ref 70–99)

## 2023-05-30 PROCEDURE — 250N000013 HC RX MED GY IP 250 OP 250 PS 637: Performed by: NURSE PRACTITIONER

## 2023-05-30 PROCEDURE — G0177 OPPS/PHP; TRAIN & EDUC SERV: HCPCS

## 2023-05-30 PROCEDURE — 124N000002 HC R&B MH UMMC

## 2023-05-30 PROCEDURE — 99232 SBSQ HOSP IP/OBS MODERATE 35: CPT | Performed by: PSYCHIATRY & NEUROLOGY

## 2023-05-30 RX ADMIN — INSULIN GLARGINE 15 UNITS: 100 INJECTION, SOLUTION SUBCUTANEOUS at 08:34

## 2023-05-30 RX ADMIN — METFORMIN ER 500 MG 2000 MG: 500 TABLET ORAL at 08:34

## 2023-05-30 RX ADMIN — RISPERIDONE 4 MG: 4 TABLET ORAL at 20:55

## 2023-05-30 ASSESSMENT — ACTIVITIES OF DAILY LIVING (ADL)
ADLS_ACUITY_SCORE: 28
DRESS: INDEPENDENT
ADLS_ACUITY_SCORE: 32
ORAL_HYGIENE: INDEPENDENT
ADLS_ACUITY_SCORE: 28
ADLS_ACUITY_SCORE: 32
ADLS_ACUITY_SCORE: 32
ADLS_ACUITY_SCORE: 28
ADLS_ACUITY_SCORE: 28
LAUNDRY: UNABLE TO COMPLETE
ADLS_ACUITY_SCORE: 28
ADLS_ACUITY_SCORE: 32
ORAL_HYGIENE: INDEPENDENT
ADLS_ACUITY_SCORE: 32
ADLS_ACUITY_SCORE: 28
DRESS: SCRUBS (BEHAVIORAL HEALTH);INDEPENDENT
HYGIENE/GROOMING: INDEPENDENT
HYGIENE/GROOMING: INDEPENDENT
ADLS_ACUITY_SCORE: 32
LAUNDRY: WITH SUPERVISION

## 2023-05-30 NOTE — PROVIDER NOTIFICATION
05/30/23 1658   Individualization/Patient Specific Goals   Patient Personal Strengths expressive of emotions;positive educational history;positive vocational history;stable living environment   Patient Vulnerabilities family/relationship conflict;lacks insight into illness;occupational insecurity;limited support system   Anxieties, Fears or Concerns seems anxious about staying here unitl his transfer to IRTS   Individualized Care Needs Disabilities Management   Patient/Family-Specific Goals (Include Timeframe) Pt would like to discharge as soon as possible   Interprofessional Rounds   Summary Pt is committed and [facility]. He has paranoia and anxiety.   Participants CTC;nursing;psychiatrist   Behavioral Team Discussion   Participants Ishmael Card MD, Tracy QUINTERO, Gina Casalende RN   Progress Requires ongoing stabilization, Needs door to door placement   Anticipated length of stay DC on June 6   Continued Stay Criteria/Rationale ongoing statbilization   Medical/Physical dx with diabeties type 2- pt has BG regime   Precautions cheecking   Plan [facility] on June 6   Rationale for change in precautions or plan na   Safety Plan per unit protocol   Anticipated Discharge Disposition IRTS

## 2023-05-30 NOTE — PROGRESS NOTES
"[facility], [place]   Psychiatric Progress Note        Interim History:     The patient's care was discussed with the treatment team during the daily team meeting and/or staff's chart notes were reviewed.  Staff report patient was transferred over night from [facility] due to concerns of his roommate disrobing there and no other options for placing him there. He is committed and [facility], was interviewed and accepted to [facility]. Per staff report Alexander has been pretty cooperative since his arrival to unit 30. He was reported to sleep for about 6 hours, was compliant with meds and unit rules. Anxious about his discharge and asked number of staff members about an exact date of discharge. Per [facility]'s note, patient was disappointed to hear that [facility] could take him only on June 6 and asked for additional referrals.      Met with patient: he was seen in a conference room in presence of PA student. Patient was pleasant, visibly anxious and immediately asked about his discharge date. We told him that we didn't know that (it was before [facility] received an update from [facility]), promised to clarify this with [facility] and let him know. Alexander could not explain reason for coming to this hospital, then said that he thought that people were after him: \"but I don't think this now. I am all better\". When asked if he knew about his diagnosis, he said that he knew that it was psychosis, but didn't believe it to be true and thought that he was misdiagnosed. He appeared to be somewhat guarded and not necessarily 100 % open about his symptoms, but denied presence of Auditory hallucinations, Visual hallucinations, Suicidal ideation, Homicidal thoughts. He had no further questions or concerns.          Medications:       fluticasone  1 spray Both Nostrils BID     risperiDONE  4 mg Oral QPM    Or     haloperidol lactate  5 mg Intramuscular QPM     insulin glargine  15 Units Subcutaneous QAM AC     " metFORMIN  2,000 mg Oral QAM          Allergies:   No Known Allergies       Labs:     Recent Results (from the past 24 hour(s))   Glucose by meter    Collection Time: 05/30/23  8:38 AM   Result Value Ref Range    GLUCOSE BY METER POCT 152 (H) 70 - 99 mg/dL          Psychiatric Examination:     /77 (Patient Position: Sitting)   Pulse 88   Temp 98.1  F (36.7  C) (Oral)   Resp 17   Wt 87.1 kg (192 lb)   SpO2 97%   BMI 31.95 kg/m    Weight is 192 lbs 0 oz  Body mass index is 31.95 kg/m .    Orthostatic Vitals       Most Recent      Sitting Orthostatic /77 05/30 0905    Sitting Orthostatic Pulse (bpm) 85 05/30 0905    Standing Orthostatic /76 05/30 0905    Standing Orthostatic Pulse (bpm) 102 05/30 0905          Appearance: awake, alert and dressed in hospital scrubs  Attitude:  cooperative and guarded  Eye Contact:  fair  Mood:  anxious  Affect:  constricted mobility and guarded  Speech:  clear, coherent  Psychomotor Behavior:  no evidence of tardive dyskinesia, dystonia, or tics  Throught Process:  linear and goal oriented  Associations:  no loose associations  Thought Content:  no evidence of psychotic thought  Insight:  partial  Judgement:  limited  Oriented to:  time, person, and place  Attention Span and Concentration:  fair  Recent and Remote Memory:  fair    Clinical Global Impressions  First:  Considering your total clinical experience with this particular patient population, how severe are the patient's symptoms at this time?: 7 (04/22/23 0916)  Compared to the patient's condition at the START of treatment, this patient's condition is: 4 (04/22/23 0916)  Most recent:  Considering your total clinical experience with this particular patient population, how severe are the patient's symptoms at this time?: 7 (05/01/23 0731)  Compared to the patient's condition at the START of treatment, this patient's condition is: 4 (05/01/23 0731)         Precautions:     Behavioral Orders   Procedures      Cheeking Precautions (behavioral units)     Patient Observed swallowing PO medications; Patient asked to drink water after swallowing medication; Patient in Staff line of sight for 15 minutes after medication given; Mouth checks after PO administration (patient asked to open mouth and stick out their tongue).     Code 1     Petition for commitment     MI with [facility] (requested meds Abilify, Risperdal, Haldol, Zyprexa) in [facility].     Routine Programming     As clinically indicated     Status 15     Every 15 minutes.          DIagnoses:     Unspecified psychosis (differentials include schizophrenia spectrum disorder and bipolar disorder)  Type 2 diabetes         Plan:     Patient was accepted to [facility] with scheduled discharge date on 6/6, See discussion above. CTC will try to make additional referrals to see if he could be transferred to another IR sooner. Will continue to provide support and structure. No medication changes today 5/30/2023.     Total time spent was 35 minutes. Over 50% of times was spent counseling and coordination of care regarding coping skills, medication and discharge planning.

## 2023-05-30 NOTE — TELEPHONE ENCOUNTER
R: 10:00PM Dr Thorpe requesting pt transfer to unit 30.  Pt added to unit queue.  Unit 30 notified.

## 2023-05-30 NOTE — PLAN OF CARE
"Goal Outcome Evaluation:      Plan of Care Reviewed With: patient    Overall Patient Progress: no changeOverall Patient Progress: no change       Problem: Adult Behavioral Health Plan of Care  Goal: Plan of Care Review  Outcome: Not Progressing  Flowsheets (Taken 5/30/2023 1655)  Plan of Care Reviewed With: patient  Overall Patient Progress: no change  Patient Agreement with Plan of Care: agrees     Patient is out in  milieu, social and engaged with peers. Patient denies having pain and he is requesting to discontinue to the blood sugar checks stating \"my fingers hurt and I don't know why we keep checking twice every day. I eat all my food and take my medications as prescribed. Please ask the doctor to stop this checks. I will let you check my blood sugar tonight but I will talk to the doctor tomorrow. And can I go back to my unit? I don't like this unit. And why do I need to go to IRTS? I just want to go home.\" Writer listened, acknowledged and validated patient's feelings. Patient's blood sugar before dinner was 174. Patient ate 100% of his meals. Patient was observed pacing the hallways while listening to music via headphones. Patient is med compliant, denies all psych symptoms and contracts for safety.Will continue to monitor.  Vitals:./77 (Patient Position: Sitting)   Pulse 88   Temp 98.1  F (36.7  C) (Oral)   Resp 17   Wt 87.1 kg (192 lb)   SpO2 97%   BMI 31.95 kg/m  .    "

## 2023-05-30 NOTE — CARE PLAN
"Occupational Therapy     05/30/23 1500   Group Therapy Session   Group Attendance attended group session   Time Session Began 1315   Time Session Ended 1415   Total Time (minutes) 50   Total # Attendees 4-6   Group Type task skill   Group Topic Covered coping skills/lifestyle management;leisure exploration/use of leisure time;problem-solving;structured socialization   Group Session Detail OT: Education on healthy leisure engagement with creative hands-on endeavor ([employer]) to increase healthy relaxation, coping with stress, opportunity to socialize, concentration, attention to task/detail, problem solving, frustration tolerance, creative expression, healthy leisure engagement, improving feelings of self-worth, planning, and sequencing   Patient Response/Contribution cooperative with task;other (see comments)  (blunted; restless)   Patient Participation Detail Pt reported during check-in he enjoys \"riding the bus\" as a healthy leisure activity. Pt sat among peers to complete presented activity but did not engage in social interactions with peers. Pt able to follow verbal directions and visual demonstration for activity. Pt worked in a quick manner to complete project and did not take the time to plan a pattern or cohesive design. Pt appeared restless as after he completed the project he paced around the room and walked in and out of the group area multiple times.        "

## 2023-05-30 NOTE — PLAN OF CARE
Assessment/Intervention/Current Symtoms and Care Coordination  Current Symptoms  Resolving    Pt was anxious to hear about [facility].  I called there and they said they would admit him on Tuesday, June 6.  When I reported this to him he got very anxious again and said he would like to interview at other IRTS.  I said by the time we went through the process with other iRTS it would be a week. He was insistent that there are places that would take him in a day.  He asked if he could be transferred back to the other unit as he could get snacks anytime he wanted there.        Discharge Plan or Goal  Pending stabilization & development of a safe discharge plan.  Considerations include: Intensive Residential Treatment Services (IRTS): TBD      Barriers to Discharge  Patient requires further psychiatric stabilization due to current symptomology      Referral Status  Intensive Residential Treatment Services (IRTS):           [facility]  [facility]  [address] [place] [id number]      [email or web address]      Rashaad Guo  [phone]  [email or web address]    Have supportive housing and work programs that pt's can transition to.   Admission : Direct:  [phone]    Direct to Staff:  [phone]     Main  [phone]       Admissions:   [phone]    Direct to Staff:  [phone]      Main:  Fax: [phone]         Legal Status  committed

## 2023-05-30 NOTE — PLAN OF CARE
Problem: Psychotic Signs/Symptoms  Goal: Optimal Cognitive Function (Psychotic Signs/Symptoms)  Intervention: Support and Promote Cognitive Ability  Recent Flowsheet Documentation  Taken 5/29/2023 1800 by Aviva Wright RN  Trust Relationship/Rapport:    care explained    choices provided    emotional support provided     Problem: Psychotic Signs/Symptoms  Goal: Improved Mood Symptoms  Intervention: Optimize Emotion and Mood  Recent Flowsheet Documentation  Taken 5/29/2023 1800 by Aviva Wright RN  Diversional Activity: music     Problem: Psychotic Signs/Symptoms  Goal: Increased Participation and Engagement (Psychotic Signs/Symptoms)  Intervention: Facilitate Participation and Engagement  Recent Flowsheet Documentation  Taken 5/29/2023 1800 by Aviva Wright RN  Diversional Activity: music  Plan of Care Reviewed With: patient      Pt is out in the milieu pacing wearing headphones. He is calm and cooperative on approach. Pt is social with select peers. He denies pain, anxiety, depression SI, HI, AH. Pt denies any concerns at this time. He denies medication side effects. Blood glucose was 186. Pt said he ate snacks before approaching writer. He ate 100% dinner. He told writer he has some second thoughts about using [facility] after discharge. He is thinking of doing finger pricks. Pt is medication compliant with [facility] med.   At HS, pt couldn't get in his room. Pt was calm and cooperative while staff are working on his transfer to another unit per order. He is receptive to the plan and said ok when he was informed that he has a roommate in his new unit. Report given to Portia PEDRAZA.  Pt left the unit at 2230 with  and primary RNs. All belongings and discharge medications went with pt.

## 2023-05-30 NOTE — PLAN OF CARE
Problem: Suicidal Behavior  Goal: Suicidal Behavior is Absent or Managed  Outcome: Progressing  Flowsheets (Taken 5/30/2023 1224)  Mutually Determined Action Steps (Suicidal Behavior Absent/Managed): verbalizes safety check rationale     Problem: Psychotic Signs/Symptoms  Goal: Improved Mood Symptoms  Intervention: Optimize Emotion and Mood  Recent Flowsheet Documentation  Taken 5/30/2023 0905 by Casalenda, Gina R, RN  Diversional Activity: television     Pt denies anxiety and depression. He denies SI/SIB/HI/AH/VH. He contracts for safety. Pt denies pain. A/O. Pt presents with a flat, blunted affect. Pt observed to be calm and cooperative. BG before breakfast 152. Pt visible on unit attending meals, watching television, and pacing the hallway. Pt observed to be restless. Pt keeps to himself while out on the unit. Pt attended group this shift. Pt visible intermittently resting in his room. Pt's eye contact observed to be appropriate. Pt's speech observed to be appropriate. Pt's concentration observed to be impaired. Pt observed to be withdrawn. Pt refused scheduled flonase but took the remainder of his scheduled medications. Pt denies any medication SE at this time. Pt asked about when he will be discharging as he reports being accepted to [facility]. Writer encouraged pt to discuss with CTC today - pt acknowledged an understanding of this; however, pt continued throughout the day to approach writer and additional staff at the nurse's station to request to see CTC and to ask for an update. Writer, as well as other staff, reminded pt that CTC was aware of pt's request and would see him when able. Pt needs frequent reminders that CTC will find him when she is ready to see him. Pt remains on cheeking precautions - no evidence of cheeking noted this shift. Will continue to monitor and assist as needed.

## 2023-05-30 NOTE — CARE PLAN
"Occupational Therapy     05/30/23 1200   Group Therapy Session   Group Attendance attended group session   Time Session Began 1015   Time Session Ended 1115   Total Time (minutes) 45   Total # Attendees 7   Group Type psychoeducation;recreation   Group Topic Covered balanced lifestyle;coping skills/lifestyle management;leisure exploration/use of leisure time;structured socialization   Group Session Detail OT: Education on 8 Dimensions of Wellness with physical movement (gentle exercise) and interactive social activity (Chat Pack) to increase concentration, attention to task, symptom management, coping with stress, sharing information about self, listening to others, physical wellness, social wellness, and overall well-being   Patient Response/Contribution cooperative with task;other (see comments)  (pleasant; cooperative; engaged; distractible)   Patient Participation Detail Pt reported during check-in he enjoys \"family gatherings and talking walks with my family\" as a way to support his physical and social wellness. Pt engaged in physical movement exercises and answered all questions about himself (when prompted by therapist). Pt able to identify three things he is grateful for today and was able to recall one new thing he learned about a peer.        "

## 2023-05-30 NOTE — PLAN OF CARE
Problem: Psychotic Signs/Symptoms  Goal: Improved Behavioral Control (Psychotic Signs/Symptoms)  Outcome: Progressing     Problem: Sleep Disturbance  Goal: Adequate Sleep/Rest  Outcome: Progressing   Goal Outcome Evaluation:       Pt was noted to be awake at the beginning of the shift. He went to bed and was  noted to be sleeping comfortably with even and unlabored respirations during all safety checks. Pt slept for 6 hours. No other concerns noted or verbalized.

## 2023-05-30 NOTE — PROGRESS NOTES
05/29/23 2100   Group Therapy Session   Group Attendance attended group session   Time Session Began 2000   Time Session Ended 2050   Total Time (minutes) 50   Total # Attendees 4   Group Type recreation   Group Topic Covered leisure exploration/use of leisure time   Group Session Detail TR leisure group   Patient Response/Contribution cooperative with task   Patient Participation Detail Pt actively participated in a structured Therapeutic Recreation group with a focus on leisure participation, stress reduction, and social engagement via a group game. Pt was familiar with the activity, but needed some reminders on the instructions. Pt was appropriate with his interactions and socialized some within the group and often helped with the scoring.

## 2023-05-31 LAB
GLUCOSE BLDC GLUCOMTR-MCNC: 119 MG/DL (ref 70–99)
GLUCOSE BLDC GLUCOMTR-MCNC: 157 MG/DL (ref 70–99)

## 2023-05-31 PROCEDURE — H2032 ACTIVITY THERAPY, PER 15 MIN: HCPCS

## 2023-05-31 PROCEDURE — 124N000002 HC R&B MH UMMC

## 2023-05-31 PROCEDURE — 99231 SBSQ HOSP IP/OBS SF/LOW 25: CPT | Performed by: PSYCHIATRY & NEUROLOGY

## 2023-05-31 PROCEDURE — 250N000013 HC RX MED GY IP 250 OP 250 PS 637: Performed by: NURSE PRACTITIONER

## 2023-05-31 PROCEDURE — G0177 OPPS/PHP; TRAIN & EDUC SERV: HCPCS

## 2023-05-31 PROCEDURE — 90853 GROUP PSYCHOTHERAPY: CPT

## 2023-05-31 RX ADMIN — METFORMIN ER 500 MG 2000 MG: 500 TABLET ORAL at 08:43

## 2023-05-31 RX ADMIN — INSULIN GLARGINE 15 UNITS: 100 INJECTION, SOLUTION SUBCUTANEOUS at 08:45

## 2023-05-31 RX ADMIN — RISPERIDONE 4 MG: 4 TABLET ORAL at 21:20

## 2023-05-31 ASSESSMENT — ACTIVITIES OF DAILY LIVING (ADL)
ADLS_ACUITY_SCORE: 32
HYGIENE/GROOMING: INDEPENDENT
ORAL_HYGIENE: INDEPENDENT
DRESS: SCRUBS (BEHAVIORAL HEALTH);INDEPENDENT
LAUNDRY: UNABLE TO COMPLETE
DRESS: INDEPENDENT
ADLS_ACUITY_SCORE: 32
HYGIENE/GROOMING: INDEPENDENT
ADLS_ACUITY_SCORE: 32
ORAL_HYGIENE: INDEPENDENT
LAUNDRY: WITH SUPERVISION

## 2023-05-31 NOTE — PROGRESS NOTES
05/31/23 1800   Group Therapy Session   Group Attendance attended group session   Time Session Began 1315   Time Session Ended 1400   Total Time (minutes) 45   Total # Attendees 6   Group Type psychotherapeutic   Group Topic Covered coping skills/lifestyle management   Group Session Detail Goal Setting   Patient Response/Contribution cooperative with task;discussed personal experience with topic     This afternoon after struggling this morning he reported:  Feeling patient, accepting and going with the flow.  Goal to be happy and have a stable and steady job.  Improvement this afternoon.

## 2023-05-31 NOTE — PROGRESS NOTES
"Referent: unit    Reason for referral: Anxiety, perseverative on changing back to [facility] and wants to interview at \" several more IRTS facilities because I don't want to wait until Tuesday, why did pt R get to go to an IRTS in one day?\"    Targeted symptoms or issue: anxiety, rumination, perseveration on above, denies any mental healthy symptoms or diagnosis and doesn't understand why he has to take medications.    Goal established by patient: to accept that June 6 Tuesday is the discharge, to advocate for self with his nurse if he still is feeling adamant that he wants to go back to [facility]   by unit: would like acceptance that his discharge date is early next week.    Likely frequency of therapy: will check in a few times more before Tuesday discharge .    By group this afternoon he said he was \" feeling accepting and will go with the flow.\"  "

## 2023-05-31 NOTE — CARE PLAN
05/31/23 1227   Group Therapy Session   Group Attendance attended group session   Time Session Began 1115   Time Session Ended 1200   Total Time (minutes) 45   Total # Attendees 3   Group Type life skill;task skill;other (see comments)  (OT)   Group Topic Covered balanced lifestyle;cognitive activities;leisure exploration/use of leisure time;structured socialization   Group Session Detail OT Clinic: Pt actively participated in occupational therapy clinic to facilitate coping skill exploration, creative expression within personally meaningful activities, and clinical observation of social, cognitive, and kinesthetic performance skills.   Patient Response/Contribution able to recall/repeat info presented;cooperative with task;expressed understanding of topic   Patient Participation Detail Pt initiated coming to group. He was hesitant to initially to look at options for activity engagement/choice and then quickly asked for a word search puzzle. When given folder with various ones to choose from, he quickly chose one near the top. He did take a marker from the box and sat down at table. He was able to find several words in the first 10 minutes and when staff noted his progress, he repeatedly stated that the puzzle was too big - noting what he described as extra rows that made it too big. He left briefly to meet with provider and did return. He sat for several minutes at a time and then did refocus on the puzzle. When staff checked in about leisure interests, he noted he just likes to hang out with friends. When asked about any activities, he noted that would just sit, or talk, or watch TV. He did then also note that he does like to play basketball and go for walks. He did not interact with others, yet did look at another patient's work as she was showing it to others. He had slight difficulty in making choice to take puzzle with him to work on later or keep in group for next session.

## 2023-05-31 NOTE — PROGRESS NOTES
[facility] [place]   Psychiatric Progress Note        Interim History:     The patient's care was discussed with the treatment team during the daily team meeting and/or staff's chart notes were reviewed.  Staff report patient is compliant with unit rules and meds, though, also reports that he doesn't want to stay at this hospital to wait until 6/6 to go to [facility]. Slept for about 5.25 hours. Went to some groups.    Met with patient: he was seen in a conference room in presence of PA student. Alexander immediately told us that he didn't want to wait here for 6 more days and would like to either have additional referrals to IRTS made or to transfer to [facility] where he came from as a transfer 1 day ago. We reminded to him that making additional referrals would unlikely get him out of hospital sooner and he came from  because of having roommate with inappropriate behavior who could not be accommodated here. We also reminded to him that he was fully committed and Jarvised and had to follow treatment team's recommendation and that his previous provider Kaylie Pope NP was also in support of placing him to IRTS. Alexander somewhat calmed down, said that he understood our reasons and had no further questions or concerns. During visit patient appeared to be more reality oriented and didn't voice clearly delusional ideas.          Medications:       fluticasone  1 spray Both Nostrils BID     risperiDONE  4 mg Oral QPM    Or     haloperidol lactate  5 mg Intramuscular QPM     insulin glargine  15 Units Subcutaneous QAM AC     metFORMIN  2,000 mg Oral QAM          Allergies:   No Known Allergies       Labs:     Recent Results (from the past 24 hour(s))   Glucose by meter    Collection Time: 05/30/23  5:56 PM   Result Value Ref Range    GLUCOSE BY METER POCT 174 (H) 70 - 99 mg/dL   Glucose by meter    Collection Time: 05/31/23  8:40 AM   Result Value Ref Range    GLUCOSE BY METER POCT 119 (H)  70 - 99 mg/dL          Psychiatric Examination:     /77 (Patient Position: Sitting)   Pulse 99   Temp 97.9  F (36.6  C) (Oral)   Resp 18   Wt 87.1 kg (192 lb)   SpO2 96%   BMI 31.95 kg/m    Weight is 192 lbs 0 oz  Body mass index is 31.95 kg/m .    Orthostatic Vitals       Most Recent      Sitting Orthostatic /77 05/31 0857    Sitting Orthostatic Pulse (bpm) 99 05/31 0857    Standing Orthostatic /85 05/31 0857    Standing Orthostatic Pulse (bpm) 112 05/31 0857         Appearance: awake, alert and dressed in hospital scrubs  Attitude:  Cooperative, less guarded  Eye Contact:  fair  Mood: less anxious  Affect:  constricted mobility and guarded  Speech:  clear, coherent  Psychomotor Behavior:  no evidence of tardive dyskinesia, dystonia, or tics  Throught Process:  linear and goal oriented  Associations:  no loose associations  Thought Content:  no evidence of psychotic thought, denies Suicidal ideation, Homicidal thoughts, Auditory hallucinations and Visual hallucinations   Insight:  partial  Judgement:  limited  Oriented to:  time, person, and place  Attention Span and Concentration:  fair  Recent and Remote Memory:  fair    Clinical Global Impressions  First:  Considering your total clinical experience with this particular patient population, how severe are the patient's symptoms at this time?: 7 (04/22/23 0916)  Compared to the patient's condition at the START of treatment, this patient's condition is: 4 (04/22/23 0916)  Most recent:  Considering your total clinical experience with this particular patient population, how severe are the patient's symptoms at this time?: 7 (05/01/23 0731)  Compared to the patient's condition at the START of treatment, this patient's condition is: 4 (05/01/23 0731)         Precautions:     Behavioral Orders   Procedures     Cheeking Precautions (behavioral units)     Patient Observed swallowing PO medications; Patient asked to drink water after swallowing  medication; Patient in Staff line of sight for 15 minutes after medication given; Mouth checks after PO administration (patient asked to open mouth and stick out their tongue).     Code 1     Petition for commitment     MI with Kristofer (requested meds Abilify, Risperdal, Haldol, Zyprexa) in [facility].     Routine Programming     As clinically indicated     Status 15     Every 15 minutes.          DIagnoses:     Unspecified psychosis (differentials include schizophrenia spectrum disorder and bipolar disorder)  Type 2 diabetes         Plan:     Patient was accepted to [facility] with scheduled discharge date on 6/6, See discussion above. It is unlikely that additional referrals will help to discharge sooner. Will continue to provide support and structure. No medication changes today 5/31/2023.     Total time spent was 25 minutes. Over 50% of times was spent counseling and coordination of care regarding coping skills, medication and discharge planning.

## 2023-05-31 NOTE — PLAN OF CARE
Assessment/Intervention/Current Symtoms and Care Coordination  Current Symptoms include the following: anxious    Discharge Plan or Goal  Pending stabilization & development of a safe discharge plan.  Considerations include: Intensive Residential Treatment Services (IRTS): [facility]    Barriers to Discharge  Bed is not available until June 6.    Referral Status  Intensive Residential Treatment Services (IRTS):     Legal Status  Patient is under MI commitment in [facility]

## 2023-05-31 NOTE — PLAN OF CARE
Problem: Suicidal Behavior  Goal: Suicidal Behavior is Absent or Managed  Outcome: Progressing  Flowsheets (Taken 5/31/2023 1444)  Mutually Determined Action Steps (Suicidal Behavior Absent/Managed): verbalizes safety check rationale     Problem: Anxiety Signs/Symptoms  Goal: Improved Mood Symptoms (Anxiety Signs/Symptoms)  Outcome: Progressing  Flowsheets (Taken 5/31/2023 1444)  Mutually Determined Action Steps (Improved Mood Symptoms): adheres to medication regimen     Pt denies anxiety and depression. He denies SI/SIB/HI/AH/VH. He contracts for safety. Pt denies pain. BG before breakfast 119. Pt reports he dislikes BG checks because it hurts his fingers. Pt asked if he could have them discontinued - writer notified endocrinology of pt's request - no response. Writer educated pt on the importance of BG checks - pt acknowledged an understanding. Per IM, pt should practice doing BG checks himself - writer paged provider regarding this to consider allowing pt to do BG checks independently with staff supervision. RN to continue to administer pt's scheduled Lantus. No new orders as of currently. Pt reported frustration this morning about being on this unit and requested to go back to his previous unit. Writer informed pt that he cannot transfer back to the other unit and that he will be here until he discharges. Pt acknowledged an understanding of this. Writer offered various activities, as well as comfort measures, to try and make pt's stay easier; however, pt politely declined at this time. Writer encouraged pt to notify writer or staff if there is anything we can do to help him until his discharge date - pt acknowledged an understanding of this. Pt requested to leave earlier than next week and requested to interview with other IRTS - writer explained to pt that his request would take longer than his current discharge date/plan. Pt reminded that he will be leaving on June 6th. Pt acknowledged an understanding of  this and did not approach writer or CTC again regarding leaving earlier or transferring units. Pt's eye contact observed to be appropriate. Pt visible on unit attending meals, pacing the hallway, and attending group. Pt keeps to himself while out on the unit. Pt visible resting in his room intermittently. Pt presents with a flat, blunted affect. Pt observed to be intermittently restless; however, pt declines any pharmacological or nonpharmacological interventions. Pt's concentration observed to be impaired. Pt observed to be forgetful at times. He refused scheduled flonase and requested it be discontinued - writer informed provider of this. Pt observed to be withdrawn. Pt denies any medication SE. He remains on cheeking precautions - pt cooperative with mouth checks. Will continue to monitor and assist as needed.

## 2023-05-31 NOTE — DISCHARGE INSTRUCTIONS
Behavioral Discharge Planning and Instructions    Summary: You were admitted on 4/22/2023  due to psychosis. [facility] and later transferred to [facility] where you were treated by Dr. Card. You are being provisionally discharged on 6/6/23 from [facility] to [facility] located at [address] [place], [place] [id number].  [facility] provided the cab for your transportation.     Main Diagnosis:   Unspecified psychosis, likely paranoid schizophrenia  Type 2 diabetes, insulin dependent  Low back pain with left-sided sciatica  Mobility on left lower molar    Health Care Follow-up:   You are on a plan called [facility]. You will need to coordinate your services through [facility]. Member Services: [phone]  You can also get transportation through this number.  Transportation:  : 8:30 AM  [place] [address]  Will call [phone].on arrival.  Drop off:  [address] [address]  Transportation Co.: [employer]  Phone: [phone]      You are being admitted to a mental health residential treatment center called an [facility].  [facility]  The Health Unit Coordinator will fax these discharge instructions to fax: [phone]    [address] [address]      [email or web address]    Rashaad Guo  [phone]  [email or web address]    Have supportive housing and work programs that pt's can transition to.   Admission : Direct:  [phone]    Direct to Staff:  [phone]     [facility]  [phone]               You will receive psychiatric medication management services at the [facility] facility.      Primary Care Appointment: Monday June 26th at 2:20pm in-person  Be sure to talk to them about your diabetes and  ask if you need a referal to endocrinology.   Provider: EDEN Cavanaugh  [facility]  [address], [place] [address]  Phone: [phone]  Fax: [phone]  Notes: Appointment is scheduled to establish care and for hospital follow up/diabetic care. Appointment is scheduled to last 40 minutes. Please bring photo ID/insurance card with you to the appointment. HUC please fax AVS        [facility] File No; [facility]-  You are on a civil commitment.  You have a neuroleptic Order for medications.  Case Management Appointment- You are court ordered to continue to work with your  monthly. Your  will contact you the week of discharge to schedule your next appointment.   Americo Cheatham    @ [facility]   Cell: [phone]  Office: [phone]    [email or web address]  The Health Unit Coordinator has faxed these discharge instructions to Fax: [phone]      Attend all scheduled appointments with your outpatient providers. Call at least 24 hours in advance if you need to reschedule an appointment to ensure continued access to your outpatient providers.     Major Treatments, Procedures and Findings:  You were provided with: a psychiatric assessment, assessed for medical stability, medication evaluation and/or management, group therapy, milieu management, and medical interventions    Goals accomplished while inpatient   We assisted you with applying for SNAP and Cash Assistance. You will need to contact [facility] once you discharge to inform them that you are no longer hospitalized. You ar required to do this by the end of June or your case will close. You were informed that your case will be transferred to a specific team due to discharging to a integrated community setting. You will be able to receive cash assistance right away and SNAP after you discharge from [facility]. [phone]  You are interested in working again and  maintaining your mental health. [facility] assists with finding employment while at the IRTS. You can also access their supportive living program if you do not want to return home to live with your parents in the future  CADI Services: Your mental health CM is assisted you with getting SMRT'd and applying for CADI to access services after you complete treatment. Please follow up with your CM.      Dental Resources   You subsequently attended a dental appointment at which time the dentist advised you to remove the tooth because it will eventually fall out, but you declined. You later reported that dental pain abated and mobility of the tooth was reduced. Please contact the [facility] for a follow up appointment as needed [phone]        Symptoms to Report: feeling more aggressive, increased confusion, losing more sleep, mood getting worse, or thoughts of suicide    Early warning signs can include: increased depression or anxiety sleep disturbances increased thoughts or behaviors of suicide or self-harm  increased unusual thinking, such as paranoia or hearing voices    Safety and Wellness:  Take all medicines as directed.  Make no changes unless your doctor suggests them.      Follow treatment recommendations.  Refrain from alcohol and non-prescribed drugs.  If there is a concern for safety, call 911.    Resources:   Crisis Intervention: 542.414.3930 or 955-229-7731 (TTY: 782.721.9437).  Call anytime for help.  National [place] on Mental Illness (www.[place].viraj.org): 743.763.1247 or 466-767-8621.  [place] Association for Children's Mental Health (www.macmh.org): 237.252.1326.  Alcoholics Anonymous (www.alcoholics-anonymous.org): Check your phone book for your local chapter.  Suicide Awareness Voices of Education (SAVE) (www.save.org): 479-004-ESME (6706)  National Suicide Prevention Line (www.mentalhealthmn.org): 388-715-WHYE (1253)  Mental Health Consumer/Survivor Network of [place]  "(www.[facility].net): 628-694-6120 or 588-591-2907  Mental Health Association of [place] (www.mentalhealth.org): 104.842.8441 or 528-556-2119  Self- Management and Recovery Training., SMART-- Toll free: 608.818.6433  www.[employer]  [facility] Crisis Response 980 686-2471  [facility] Crisis (COPE) Response - Adult 490 000-7642  Text 4 Life: txt \"LIFE\" to 05630 for immediate support and crisis intervention  Crisis text line: Text \"[place]\" to 041854. Free, confidential, 24/7.  Crisis Intervention: 908.880.9006 or 467-194-4171. Call anytime for help.   [facility] Crisis Team - Child: [phone]    General Medication Instructions:   See your medication sheet(s) for instructions.   Take all medicines as directed.  Make no changes unless your doctor suggests them.   Go to all your doctor visits.  Be sure to have all your required lab tests. This way, your medicines can be refilled on time.  Do not use any drugs not prescribed by your doctor.  Avoid alcohol.    Advance Directives:   Scanned document on file with ATEME? No scanned doc  Is document scanned? Pt states no documents  Honoring Choices Your Rights Handout: Informed and given  Was more information offered? Pt declined    The Treatment team has appreciated the opportunity to work with you. If you have any questions or concerns about your recent admission, you can contact the unit which can receive your call 24 hours a day, 7 days a week. They will be able to get in touch with a Provider if needed. The unit number is [phone].  "

## 2023-05-31 NOTE — PLAN OF CARE
Problem: Sleep Disturbance  Goal: Adequate Sleep/Rest  Outcome: Progressing   Goal Outcome Evaluation:  Assumed care of patient at 1130 pm, in bed sleeping without any s/s distress.No behavioral issues noted.15 minute safety checks maintained as per policy.No meds given this shift, remains on cheeking precautions.Safety maintained.Nursing will continue to monitor as per plan of care. Slept for 5.25 hrs

## 2023-05-31 NOTE — PLAN OF CARE
"Goal Outcome Evaluation:    Patient is pleasant with a flat affect. Patient denies pain, anxiety, depression, SI,HI and hallucinations. Patient refuses to practice checking his own blood sugar stating \"I don't need to learn how to do that because at home I use the [employer] Flash Glucose Monitoring system, and I don't have to poke myself.\" Patient has a good appetite and fluid intake. Blood sugar before dinner was 157. Patient is med compliant, no mention of going back to unit 4A this shift. However patient curiously asking what would happen if he does not comply with blood sugar checks; and if it will jeopardize his discharge plans. Patient is social and engages appropriately with both staff and peers. Patient verbally contracts for safety,and no behaviors noted this shift. Will continue to monitor.  Vitals:./81   Pulse 85   Temp 98.5  F (36.9  C) (Oral)   Resp 16   Wt 87.1 kg (192 lb)   SpO2 97%   BMI 31.95 kg/m  .              "

## 2023-05-31 NOTE — CARE PLAN
05/31/23 1524   Group Therapy Session   Group Attendance attended group session   Time Session Began 1315   Time Session Ended 1345   Total Time (minutes) 30 - no charge   Total # Attendees 5   Group Type life skill;other (see comments)  (OT)   Group Topic Covered balanced lifestyle;coping skills/lifestyle management;relaxation techniques   Group Session Detail OT - Coping: Focus of group was on the practice of a variety of structured relaxation techniques. Pt were to identify those they have tried to practice and ways to improve and/or try new techniques based on their own situations and symptoms.    Patient Response/Contribution able to recall/repeat info presented;cooperative with task;expressed understanding of topic;discussed personal experience with topic   Patient Participation Detail Pt noted that his favorite relaxation is to do a BBQ with family. He did attempt to practice some of the techniques offered yet appeared distracted at times. He wanted to go outside and had some difficulty with accepting explanation re: schedule, process, etc. He made complaints about this unit, noting how he got to do several other things on 4A such as snacks and playing bean bag toss every day.

## 2023-06-01 LAB — GLUCOSE BLDC GLUCOMTR-MCNC: 137 MG/DL (ref 70–99)

## 2023-06-01 PROCEDURE — H2032 ACTIVITY THERAPY, PER 15 MIN: HCPCS

## 2023-06-01 PROCEDURE — G0177 OPPS/PHP; TRAIN & EDUC SERV: HCPCS

## 2023-06-01 PROCEDURE — 250N000013 HC RX MED GY IP 250 OP 250 PS 637: Performed by: NURSE PRACTITIONER

## 2023-06-01 PROCEDURE — 124N000002 HC R&B MH UMMC

## 2023-06-01 PROCEDURE — 99231 SBSQ HOSP IP/OBS SF/LOW 25: CPT | Performed by: PSYCHIATRY & NEUROLOGY

## 2023-06-01 RX ADMIN — FLUTICASONE PROPIONATE 1 SPRAY: 50 SPRAY, METERED NASAL at 08:22

## 2023-06-01 RX ADMIN — RISPERIDONE 4 MG: 4 TABLET ORAL at 21:10

## 2023-06-01 RX ADMIN — METFORMIN ER 500 MG 2000 MG: 500 TABLET ORAL at 08:22

## 2023-06-01 RX ADMIN — INSULIN GLARGINE 15 UNITS: 100 INJECTION, SOLUTION SUBCUTANEOUS at 08:23

## 2023-06-01 ASSESSMENT — ACTIVITIES OF DAILY LIVING (ADL)
ADLS_ACUITY_SCORE: 29
ADLS_ACUITY_SCORE: 32
ADLS_ACUITY_SCORE: 29
LAUNDRY: UNABLE TO COMPLETE
ADLS_ACUITY_SCORE: 32
DRESS: SCRUBS (BEHAVIORAL HEALTH)
ADLS_ACUITY_SCORE: 32
HYGIENE/GROOMING: INDEPENDENT
ADLS_ACUITY_SCORE: 29
ADLS_ACUITY_SCORE: 32
ORAL_HYGIENE: INDEPENDENT
ADLS_ACUITY_SCORE: 29
ADLS_ACUITY_SCORE: 32
ADLS_ACUITY_SCORE: 32

## 2023-06-01 NOTE — CARE PLAN
05/31/23 1900   Group Therapy Session   Group Attendance attended group session   Time Session Began 1700   Time Session Ended 1745   Total Time (minutes) 45   Total # Attendees 6   Group Type expressive therapy   Group Topic Covered emotions/expression   Patient Response/Contribution cooperative with task     Art Therapy directive is to create a drawing of a safe place and to identify five items representing each of the five senses within the safe place.  Goals of directive: emotional expression, emotional regulation, trauma containment  Pt was an engaged participant, focused on task for the full duration of group. Pt finished artwork and briefly shared with group. Pt created a drawing of pts backyard as his safe place. Pt described various natural elements and sensory detail of this space.  Pts mood was calm, pleasant participant.

## 2023-06-01 NOTE — PLAN OF CARE
Night Nursing Note    5/31/23 - 6/1/23        Alexander was in bed at beginning of shift and appeared asleep.  Monitored q15 mins for safety.  Appeared to sleep majority of night without difficulty.  Continue to monitor, offer support and reassurance per care plan.    Slept 6.75 hrs.

## 2023-06-01 NOTE — PROGRESS NOTES
"[facility], [place]   Psychiatric Progress Note        Interim History:   The patient's care was discussed with the treatment team during the daily team meeting and/or staff's chart notes were reviewed.  Staff report patient was pleasant overnight as well as this morning. Yesterday asked several times to see CTC about more IRTS referrals. He was able to talk with RN and therapist about the plan to have him discharge to [facility] on June 6th. He also brought up wanting to go back to his previous unit, but he was again reminded that he is close to discharge and other units won't likely accept his transfer. After the conversations with RN and  Therapist he didn't ask to speak with CTC anymore and seemed to accept the plan set forth. Medication compliant, but would like his Flonase discontinued. He did attend some groups yesterday.     Met with patient: Patient was in bed resting, but came to be seen in an interview room in the presence of PA student. He states his mood is \"good\", that his medications have been going well, and he denies any anxiety. He says the only reason he is here is because of a misunderstanding where he thought an older gentleman was an . States that he was quite suspicious when this man's PIN at the [facility] was his birthday. Says that this was all a misunderstanding and he doesn't believe these things anymore. It was all just a coincidence. Denies AVH. Didn't voice any other unusual ideas. He is looking forward to his discharge.          Medications:       fluticasone  1 spray Both Nostrils BID     risperiDONE  4 mg Oral QPM    Or     haloperidol lactate  5 mg Intramuscular QPM     insulin glargine  15 Units Subcutaneous QAM AC     metFORMIN  2,000 mg Oral QAM          Allergies:   No Known Allergies       Labs:     Recent Results (from the past 24 hour(s))   Glucose by meter    Collection Time: 05/31/23  6:02 PM   Result Value Ref Range    GLUCOSE " "BY METER POCT 157 (H) 70 - 99 mg/dL   Glucose by meter    Collection Time: 06/01/23  8:19 AM   Result Value Ref Range    GLUCOSE BY METER POCT 137 (H) 70 - 99 mg/dL          Psychiatric Examination:     /80   Pulse 90   Temp 97.8  F (36.6  C) (Temporal)   Resp 16   Wt 87.1 kg (192 lb)   SpO2 98%   BMI 31.95 kg/m    Weight is 192 lbs 0 oz  Body mass index is 31.95 kg/m .    Orthostatic Vitals       Most Recent      Sitting Orthostatic /77 05/31 0857    Sitting Orthostatic Pulse (bpm) 99 05/31 0857    Standing Orthostatic /85 05/31 0857    Standing Orthostatic Pulse (bpm) 112 05/31 0857           Appearance: awake, alert and dressed in hospital scrubs  Attitude:  cooperative  Eye Contact:  fair  Mood: less anxious  Affect:  constricted mobility and labile  Speech:  clear, coherent  Psychomotor Behavior:  no evidence of tardive dyskinesia, dystonia, or tics and intact station, gait and muscle tone  Throught Process:  linear  Associations:  no loose associations  Thought Content:  no evidence of psychotic thought, denies Suicidal ideation, Homicidal thoughts, Auditory hallucinations and Visual hallucinations. Delusional ideas seem to be \"low key\";  Insight:  partial  Judgement:  limited  Oriented to:  time, person, and place  Attention Span and Concentration:  fair  Recent and Remote Memory:  fair    Clinical Global Impressions  First:  Considering your total clinical experience with this particular patient population, how severe are the patient's symptoms at this time?: 7 (04/22/23 0916)  Compared to the patient's condition at the START of treatment, this patient's condition is: 4 (04/22/23 0916)  Most recent:  Considering your total clinical experience with this particular patient population, how severe are the patient's symptoms at this time?: 7 (05/01/23 0731)  Compared to the patient's condition at the START of treatment, this patient's condition is: 4 (05/01/23 0731)         Precautions: "     Behavioral Orders   Procedures     Cheeking Precautions (behavioral units)     Patient Observed swallowing PO medications; Patient asked to drink water after swallowing medication; Patient in Staff line of sight for 15 minutes after medication given; Mouth checks after PO administration (patient asked to open mouth and stick out their tongue).     Code 1     Petition for commitment     MI with [facility] (requested meds Abilify, Risperdal, Haldol, Zyprexa) in [facility].     Routine Programming     As clinically indicated     Status 15     Every 15 minutes.          DIagnoses:     Unspecified psychosis (differentials include schizophrenia spectrum disorder and bipolar disorder)  Type 2 diabetes         Plan:     Continue with planned discharge to [facility] on 6/6. Will continue to provide support and structure and encourage group participation. No medication changes today 6/1/23.     I was present with PA student who participated in the service and in the documentation of the note. I have verified the history and personally performed the physical exam and medical decision making. I agree with the assessment and plan of care as documented in the note.     Total time spent was 27 minutes. Over 50% of times was spent counseling and coordination of care regarding coping skills, medication and discharge planning.

## 2023-06-01 NOTE — PLAN OF CARE
Assessment/Intervention/Current Symtoms and Care Coordination  Current Symptoms include the following: anxious      Discharge Plan or Goal  Pending stabilization & development of a safe discharge plan.  Considerations include: Intensive Residential Treatment Services (IRTS): [facility]      Barriers to Discharge  Patient requires further psychiatric stabilization due to current symptomology      Referral Status  Intensive Residential Treatment Services (IRTS):       Rashaad at [facility] asked for the admission paperwork and I sent this. His nurse needed to look at diabetic orders.  He confirms admission for June 6.    I did ask CC to schedule PCP appointment for diabetic care.    Legal Status  Patient is under MI commitment in [facility]

## 2023-06-01 NOTE — CARE PLAN
"   06/01/23 1558   Group Therapy Session   Group Attendance attended group session   Time Session Began 1315   Time Session Ended 1400   Total Time (minutes) 45   Total # Attendees 3   Group Type community;recreation   Group Topic Covered leisure exploration/use of leisure time;balanced lifestyle;cognitive activities;structured socialization   Group Session Detail OT Leisure Group: Group activities to exercise cognitive skills while exploring leisure and socializing opportunities - \"Doodle Dice\" - players roll special designed dice to match designs.   Patient Participation Detail Pt participated in a group activity playing DoFanergiese. Pt reports having played the game before and was familiar with the rules but was open to playing again. Pt voted to play meditative music over music from the radio, when writer asked the group for music suggestions. Pt appeared engaged and sociable, and would oftentimes suggest strategies to peers to inform decision-making.        "

## 2023-06-01 NOTE — PLAN OF CARE
"  Problem: Psychotic Signs/Symptoms  Goal: Improved Behavioral Control (Psychotic Signs/Symptoms)  Outcome: Progressing   Goal Outcome Evaluation:    Patient calmed and cooperative, His vitals were stable, blood sugar was 137, he refused his nasal spray. He will be discharged next week Tuesday.  Patient denied all mental health symptoms, and stated, \"I am alright\".     Patient was visible on the unit, he attended one group section. Appetite was good ate 100% of breakfast and lunch. Denied pain or having any discomfort. No behavior issues or any safety concern.                      "

## 2023-06-02 LAB
GLUCOSE BLDC GLUCOMTR-MCNC: 118 MG/DL (ref 70–99)
GLUCOSE BLDC GLUCOMTR-MCNC: 131 MG/DL (ref 70–99)

## 2023-06-02 PROCEDURE — G0177 OPPS/PHP; TRAIN & EDUC SERV: HCPCS

## 2023-06-02 PROCEDURE — 99231 SBSQ HOSP IP/OBS SF/LOW 25: CPT | Performed by: PSYCHIATRY & NEUROLOGY

## 2023-06-02 PROCEDURE — 124N000002 HC R&B MH UMMC

## 2023-06-02 PROCEDURE — 250N000013 HC RX MED GY IP 250 OP 250 PS 637: Performed by: NURSE PRACTITIONER

## 2023-06-02 RX ADMIN — METFORMIN ER 500 MG 2000 MG: 500 TABLET ORAL at 08:26

## 2023-06-02 RX ADMIN — RISPERIDONE 4 MG: 4 TABLET ORAL at 20:40

## 2023-06-02 RX ADMIN — INSULIN GLARGINE 15 UNITS: 100 INJECTION, SOLUTION SUBCUTANEOUS at 08:26

## 2023-06-02 ASSESSMENT — ACTIVITIES OF DAILY LIVING (ADL)
ADLS_ACUITY_SCORE: 29
HYGIENE/GROOMING: INDEPENDENT
ORAL_HYGIENE: INDEPENDENT
ADLS_ACUITY_SCORE: 29
LAUNDRY: UNABLE TO COMPLETE
ADLS_ACUITY_SCORE: 29
DRESS: SCRUBS (BEHAVIORAL HEALTH);INDEPENDENT

## 2023-06-02 NOTE — PLAN OF CARE
Goal Outcome Evaluation:  Problem: Sleep Disturbance  Goal: Adequate Sleep/Rest  6/2/2023 0701 by Daksha Richey RN  Outcome: Progressing   Patient observed sleeping at the start of the shift. Even and non-labored respirations noted. Pt appeared sleeping for approximately  7 hours.

## 2023-06-02 NOTE — PLAN OF CARE
"  Problem: Psychotic Signs/Symptoms  Goal: Improved Behavioral Control (Psychotic Signs/Symptoms)  Outcome: Progressing   Goal Outcome Evaluation:    Plan of Care Reviewed With: patient      Alexander has been irritated and frustrated this evening. He had multiple complaints and frustrations because \"People keep asking me if I'm suicidal and I have never been suicidal. Not everyone in the hospital is suicidal. I had psychosis, which I don't have anymore and I have never been psychotic.\" Attempted to have Alexander check his blood sugar this evening prior to dinner. He made multiple failed attempts.He will only use two fingertips. Alexander is concerned that it will be marked as refusal but it wasn't.  He complains that \"My fingers sweat and I don't get enough blood.\" He became extremely frustrated and anxious with the process. He is also concerned about blood draws after discharge as he doesn't like venous draws. Alexander is also reports that \"I don't want to go to an IRTS anymore and I just want to stay in the hospital for 6 months.\" Alexander reports \"I really wasn't having psychosis, I just thought my melanie was someone he wasn't. The  and  were all against me.\" Alexander is very anxious and he declined to take anything for anxiety. Alexander continues to be on Cheeking precautions. Nursing to continue to support current plan of care.                 "

## 2023-06-02 NOTE — PROGRESS NOTES
06/01/23 1929   Group Therapy Session   Time Session Began 1715   Time Session Ended 1815   Total Time (minutes) 45   Total # Attendees 5   Group Type expressive therapy   Group Topic Covered balanced lifestyle;emotions/expression;relaxation techniques   Group Session Detail Relaxation   Patient Response/Contribution cooperative with task   Patient Participation Detail Cooperatively engaged in Evening Music Relaxation group to decrease anxiety and promote self-expression.  Alexander participated more receptively in group session.  Presents as more of a concrete thinker when speaks.  Tends to not be in the foreground of group process, but more blend into the background.

## 2023-06-02 NOTE — CARE PLAN
06/02/23 1420   Group Therapy Session   Group Attendance attended group session   Time Session Began 1015   Time Session Ended 1100   Total Time (minutes) 45   Total # Attendees 4   Group Type life skill;task skill;other (see comments)  (OT)   Group Topic Covered balanced lifestyle;coping skills/lifestyle management;cognitive activities;leisure exploration/use of leisure time;structured socialization   Group Session Detail OT Clinic: Pt actively participated in occupational therapy clinic to facilitate coping skill exploration, creative expression within personally meaningful activities, and clinical observation of social, cognitive, and kinesthetic performance skills.    Patient Response/Contribution able to recall/repeat info presented;cooperative with task;expressed understanding of topic   Patient Participation Detail Pt asked staff what the group was and then what he could work on. He declined 2 options and then noted he had a word find from previous session that he had not finished and went and got it from is room. He focused on it for brief periods as he was engaged in conversation with another patient who was complaining about how long she might be here based on how  long he had said he was here. He did provide some reassurance with that it can be different for different people and which ITRS. He was able to complete most of the puzzle and focused more on his ability to do this.

## 2023-06-02 NOTE — PLAN OF CARE
Assessment/Intervention/Current Symtoms and Care Coordination  Current Symptoms include the following: anxious      Discharge Plan or Goal  Pending stabilization & development of a safe discharge plan.  Considerations include: Intensive Residential Treatment Services (IRTS): [facility]      Barriers to Discharge  IRTS could not admit the pt until 6/6.      Referral Status  Intensive Residential Treatment Services (IRTS):       Legal Status  Patient is on a court hold in [facility]

## 2023-06-02 NOTE — PROGRESS NOTES
[facility], [place]   Psychiatric Progress Note        Interim History:     The patient's care was discussed with the treatment team during the daily team meeting and/or staff's chart notes were reviewed.  Staff report patient was pleasant. Slept for about 7 hours. Was compliant with meds and denied having side effects. He did attend some groups yesterday.     Met with patient: Patient was seen in conference room in presence of PA student. He reported being in a fair mood. Confirmed that he had no concerns about med side effects. Stated that he was looking forward to his discharge to [facility] on Tuesday. He had no questions or concerns for us today.          Medications:       fluticasone  1 spray Both Nostrils BID     risperiDONE  4 mg Oral QPM    Or     haloperidol lactate  5 mg Intramuscular QPM     insulin glargine  15 Units Subcutaneous QAM AC     metFORMIN  2,000 mg Oral QAM          Allergies:   No Known Allergies       Labs:     Recent Results (from the past 24 hour(s))   Glucose by meter    Collection Time: 06/02/23  7:55 AM   Result Value Ref Range    GLUCOSE BY METER POCT 118 (H) 70 - 99 mg/dL          Psychiatric Examination:     /76 (BP Location: Left arm, Patient Position: Sitting, Cuff Size: Adult Large)   Pulse 87   Temp (P) 98.3  F (36.8  C) (Temporal)   Resp 16   Wt 87.1 kg (192 lb)   SpO2 95%   BMI 31.95 kg/m    Weight is 192 lbs 0 oz  Body mass index is 31.95 kg/m .    Orthostatic Vitals     None         Appearance: awake, alert and dressed in hospital scrubs  Attitude:  cooperative  Eye Contact:  fair  Mood: less anxious  Affect:  constricted mobility and less labile  Speech:  clear, coherent  Psychomotor Behavior:  no evidence of tardive dyskinesia, dystonia, or tics and intact station, gait and muscle tone  Throught Process:  linear  Associations:  no loose associations  Thought Content:  denies Suicidal ideation, Homicidal thoughts,  "Auditory hallucinations and Visual hallucinations. Delusional ideas seem to be \"low key\"- \"I thought that this person was an . I don't think so now. It was a misunderstanding\".  Insight:  partial  Judgement:  limited  Oriented to:  time, person, and place  Attention Span and Concentration:  fair  Recent and Remote Memory:  fair    Clinical Global Impressions  First:  Considering your total clinical experience with this particular patient population, how severe are the patient's symptoms at this time?: 7 (04/22/23 0916)  Compared to the patient's condition at the START of treatment, this patient's condition is: 4 (04/22/23 0916)  Most recent:  Considering your total clinical experience with this particular patient population, how severe are the patient's symptoms at this time?: 7 (05/01/23 0731)  Compared to the patient's condition at the START of treatment, this patient's condition is: 4 (05/01/23 0731)         Precautions:     Behavioral Orders   Procedures     Cheeking Precautions (behavioral units)     Patient Observed swallowing PO medications; Patient asked to drink water after swallowing medication; Patient in Staff line of sight for 15 minutes after medication given; Mouth checks after PO administration (patient asked to open mouth and stick out their tongue).     Code 1     Petition for commitment     MI with Kristofer (requested meds Abilify, Risperdal, Haldol, Zyprexa) in [facility].     Routine Programming     As clinically indicated     Status 15     Every 15 minutes.          DIagnoses:     Unspecified psychosis (differentials include schizophrenia spectrum disorder and bipolar disorder)  Type 2 diabetes         Plan:     Continue with planned discharge to [facility] on 6/6. Will continue to provide support and structure and encourage group participation. No medication changes today 6/2/23.     I was present with PA student who participated in the service and in the documentation " of the note. I have verified the history and personally performed the physical exam and medical decision making. I agree with the assessment and plan of care as documented in the note.     Total time spent was 26 minutes. Over 50% of times was spent counseling and coordination of care regarding coping skills, medication and discharge planning.

## 2023-06-02 NOTE — CARE PLAN
06/02/23 1434   Group Therapy Session   Group Attendance attended group session   Time Session Began 1330   Time Session Ended 1405   Total Time (minutes) 35   Total # Attendees 5   Group Type life skill;other (see comments);recreation  (OT)   Group Topic Covered leisure exploration/use of leisure time;structured socialization;balanced lifestyle   Group Session Detail OT - Topic:  Focus of group was leisure exploration and participation in a group activity that offered intrinsic motivation to engage in social, non-obligatory occupations for improved mental health management. Also provided opportunities to practice sharing thoughts and feelings, expanding social skills, and engagement in reality based activity and conversation.   Patient Response/Contribution able to recall/repeat info presented;cooperative with task;discussed personal experience with topic;expressed understanding of topic   Patient Participation Detail As activity was presented, patient asked if there was a different one available. He did agree to engage in the group activity and then was engaged in many aspects of it. He also was quick with the addition of numbers accurately. He was in agreement with all the other patients to end group early when offered more time.

## 2023-06-02 NOTE — PLAN OF CARE
Problem: Psychotic Signs/Symptoms  Goal: Improved Behavioral Control (Psychotic Signs/Symptoms)  Outcome: Progressing   Goal Outcome Evaluation:    Patient calmed and and cooperative on this shift. Blood sugar was 118, vitals stable. Patient denied all mentals health symptoms. Affect bright upon approached, attended group, was social with peers and staff. Ate 100% of  breakfast and lunch. He took all scheduled medications with no problem. No behavior issues or any safety concern.

## 2023-06-02 NOTE — PLAN OF CARE
Coordination of Care NOTE:    The following Pt appointments were scheduled:    Primary Care Appointment: Monday June 26th at 2:20pm in-person  Provider: EDEN Cavanaugh  [facility]  [address] [place] [id number]  Phone: [phone]  Fax: [phone]  Notes: Appointment is scheduled to establish care and for hospital follow up/diabetic care.Appointment is scheduled to last 40 minutes. Please bring photo ID/insurance card with you to the appointment. [facility] please fax AVS    Wr updated Discharge Instructions    Ponce Hua  Care Coordinator  [email or web address]   [phone]

## 2023-06-03 LAB
GLUCOSE BLDC GLUCOMTR-MCNC: 102 MG/DL (ref 70–99)
GLUCOSE BLDC GLUCOMTR-MCNC: 149 MG/DL (ref 70–99)

## 2023-06-03 PROCEDURE — H2032 ACTIVITY THERAPY, PER 15 MIN: HCPCS

## 2023-06-03 PROCEDURE — G0177 OPPS/PHP; TRAIN & EDUC SERV: HCPCS

## 2023-06-03 PROCEDURE — 250N000013 HC RX MED GY IP 250 OP 250 PS 637: Performed by: NURSE PRACTITIONER

## 2023-06-03 PROCEDURE — 124N000002 HC R&B MH UMMC

## 2023-06-03 RX ADMIN — RISPERIDONE 4 MG: 4 TABLET ORAL at 20:52

## 2023-06-03 RX ADMIN — METFORMIN ER 500 MG 2000 MG: 500 TABLET ORAL at 08:30

## 2023-06-03 RX ADMIN — FLUTICASONE PROPIONATE 1 SPRAY: 50 SPRAY, METERED NASAL at 20:54

## 2023-06-03 RX ADMIN — INSULIN GLARGINE 15 UNITS: 100 INJECTION, SOLUTION SUBCUTANEOUS at 08:07

## 2023-06-03 RX ADMIN — FLUTICASONE PROPIONATE 1 SPRAY: 50 SPRAY, METERED NASAL at 08:09

## 2023-06-03 ASSESSMENT — ACTIVITIES OF DAILY LIVING (ADL)
HYGIENE/GROOMING: INDEPENDENT
ADLS_ACUITY_SCORE: 29
HYGIENE/GROOMING: INDEPENDENT
ADLS_ACUITY_SCORE: 29
ORAL_HYGIENE: INDEPENDENT
ADLS_ACUITY_SCORE: 29
ADLS_ACUITY_SCORE: 29
ORAL_HYGIENE: INDEPENDENT
ADLS_ACUITY_SCORE: 29
DRESS: SCRUBS (BEHAVIORAL HEALTH);INDEPENDENT
LAUNDRY: UNABLE TO COMPLETE
DRESS: INDEPENDENT
LAUNDRY: WITH SUPERVISION
ADLS_ACUITY_SCORE: 29

## 2023-06-03 NOTE — PLAN OF CARE
Patient spent time in between his room and the lounge. Attended evening group. Denies suicidal ideation and self injurious thoughts. Denies auditory and visual hallucinations. Denies anxiety and depression. Med compliant. Ate breakfast and lunch. Cooperative with blood sugar check. Affect is flat and keeps mostly to self. Pleasant on approach.     Patient evaluation continues. Assessed mood,anxiety,thoughts and behavior.     Patient gradually progressing towards goals.    Patient is encouraged to participate in groups and assisted to develop healthy coping skills.     VS reviewed: /73   Pulse 81   Temp 98.4  F (36.9  C) (Oral)   Resp 16   Wt 87.1 kg (192 lb)   SpO2 97%   BMI 31.95 kg/m      Length of stay: 42    Refer to daily team meeting notes for individualized plan of care. Nursing will continue to assess.

## 2023-06-03 NOTE — PLAN OF CARE
Problem: Sleep Disturbance  Goal: Adequate Sleep/Rest  Outcome: Progressing   Goal Outcome Evaluation:  Pt slept throughout the night without any difficulties, noted behaviors or complaints. Safety maintained.No complaints this shift.Slept for 7hrs and currently still sleeping.   English

## 2023-06-03 NOTE — PLAN OF CARE
Goal Outcome Evaluation:  Staff will continue to monitor.     Plan of Care Reviewed With: patient      Patient is up on the unit, pleasant, attends groups and social with peers.Patient denies any pain, anxiety,SI/HI and depression. Patient is med compliant, and offers no other concerns. Patient alert and oriented to person, place, and time, appeared well groomed. Pt calm, blunted affect, good eye contact, cooperative, medication compliant. Blood sugar before dinner was 131. Pt is on cheeking precautions, no behaviors observed. Pt contracts for safety. Pt observed in unit milieu watching movies and interacts with peers appropriately. Pt demonstrates ability to communicate needs to staff. No behaviors noted. Will continue to monitor behavior and encourage engagement.   Vitals./73   Pulse 81   Temp 98.4  F (36.9  C) (Oral)   Resp 16   Wt 87.1 kg (192 lb)   SpO2 97%   BMI 31.95 kg/m   .

## 2023-06-03 NOTE — PLAN OF CARE
"  Goal Outcome Evaluation:     Plan of Care Reviewed With: patient      Patient is alert and oriented to person, place, and time. Patient is up on the unit, pleasant, attends groups and social with peers. Patient denies any pain, anxiety,SI/HI and depression. Pt is calm, blunted affect, good eye contact, cooperative, and medication compliant. Blood sugar before dinner was 102. Pt is on cheeking precautions, no behaviors observed. Pt contracts for safety. Pt observed in unit milieu watching movies and intermittently listening to music while pacing the hallway. Pt demonstrates ability to communicate needs to staff. No behaviors noted. Will continue to monitor behavior and encourage engagement.Staff will continue to monitor.     NURSING ASSESSMENT  Pain: denied  Anxiety: \"0\"   Depression: \"0\"  SI: denied  SIB: denied  HI: denied  AVH: denied, did not appear to be responding to internal stimuli, did not demonstrate delusional thinking.  Mood/Affect: blunted   Sleep: pt states \"I sleep fine\"  Medication: compliant, no side effects reported or observed  PRN: none  Appetite: 100% meals  ADLs: independent   Visits: none  Vitals: ./72   Pulse 85   Temp 98.5  F (36.9  C) (Oral)   Resp 16   Wt 87.1 kg (192 lb)   SpO2 99%   BMI 31.95 kg/m  .  Medical:  no issues reported or observed               "

## 2023-06-03 NOTE — PROGRESS NOTES
"   06/03/23 1546   Group Therapy Session   Group Attendance attended group session   Time Session Began 1115   Time Session Ended 1200   Total Time (minutes) 45   Total # Attendees 4   Group Type life skill   Group Session Detail Education and group discussion provided on positive affirmations with hands on task to make an Affirmation Calendar for the month of June for fostering hope, improving mood, reality-based activity, creative expression, fine motor skills, self-expression, building self- esteem, coping, healthy leisure and socialization.   Patient Participation Detail Pt joined group for the full session, but put forth minimal effort during the group project.  He argued that positive affirmations were \"dumb\" and he had no use for them.  Pt was then encouraged to make either a gratitude calendar or a coping calendar instead.  He kept repeating that he did not understand despite being given physical examples of all 3 types of calendars.  Pt put in about 7 of 30 gratitudes and said he didn't want to do anymore because he is \"grateful for the same things everyday.\"  Pt was very concrete in his thinking with little motivation.  Pt was unproductive for the bulk of the group session despite encouragement.       "

## 2023-06-04 LAB
GLUCOSE BLDC GLUCOMTR-MCNC: 129 MG/DL (ref 70–99)
GLUCOSE BLDC GLUCOMTR-MCNC: 162 MG/DL (ref 70–99)

## 2023-06-04 PROCEDURE — 124N000002 HC R&B MH UMMC

## 2023-06-04 PROCEDURE — 250N000013 HC RX MED GY IP 250 OP 250 PS 637: Performed by: NURSE PRACTITIONER

## 2023-06-04 RX ADMIN — FLUTICASONE PROPIONATE 1 SPRAY: 50 SPRAY, METERED NASAL at 20:40

## 2023-06-04 RX ADMIN — RISPERIDONE 4 MG: 4 TABLET ORAL at 20:38

## 2023-06-04 RX ADMIN — METFORMIN ER 500 MG 2000 MG: 500 TABLET ORAL at 08:32

## 2023-06-04 RX ADMIN — INSULIN GLARGINE 15 UNITS: 100 INJECTION, SOLUTION SUBCUTANEOUS at 08:32

## 2023-06-04 ASSESSMENT — ACTIVITIES OF DAILY LIVING (ADL)
ADLS_ACUITY_SCORE: 29
ADLS_ACUITY_SCORE: 29
DRESS: SCRUBS (BEHAVIORAL HEALTH);INDEPENDENT
HYGIENE/GROOMING: INDEPENDENT
ADLS_ACUITY_SCORE: 29
ORAL_HYGIENE: INDEPENDENT
LAUNDRY: WITH SUPERVISION
HYGIENE/GROOMING: INDEPENDENT
ADLS_ACUITY_SCORE: 29
ADLS_ACUITY_SCORE: 29
DRESS: INDEPENDENT
ADLS_ACUITY_SCORE: 29
LAUNDRY: WITH SUPERVISION
ORAL_HYGIENE: INDEPENDENT
ADLS_ACUITY_SCORE: 29

## 2023-06-04 NOTE — PROGRESS NOTES
06/03/23 1900   Group Therapy Session   Group Attendance attended group session   Time Session Began 1700   Time Session Ended 1745   Total Time (minutes) 45   Total # Attendees 6   Group Type recreation   Group Topic Covered leisure exploration/use of leisure time   Group Session Detail TR leisure group   Patient Response/Contribution cooperative with task   Patient Participation Detail Pt participated in Therapeutic Recreation group with focus on leisure participation, communication skills, and critical thinking. Engaged and focused in the group recreational activity via a group game.  Pt was a full participant throughout the entire duration of group.  Appropriately interacted with others in group. Occasionally offered some helpful cues to another patient in group.

## 2023-06-04 NOTE — PLAN OF CARE
Problem: Sleep Disturbance  Goal: Adequate Sleep/Rest  Outcome: Progressing   Goal Outcome Evaluation:  Pt slept throughout the night without any difficulties, noted behaviors or complaints.On cheek ing precautions, no med's given this shift. Safety maintained.No complaints this shift.Slept for 7 hrs and currently still sleeping.

## 2023-06-04 NOTE — PLAN OF CARE
Patient has spent majority of the shift in the milieu. Keeps to himself, listening to music and walking around the unit. Denies suicidal ideation and self injurious thoughts. Denies anxiety and depression. Denies auditory and visual hallucinations. Med compliant. Ate breakfast and lunch. Affect is flat and guarded. Cooperative on the unit. Patient enquired about his dosage of metformin. Thinks that maybe it can be lowered since he is now taking Lantus. Says that he will speak with his provider tomorrow.     Patient evaluation continues. Assessed mood,anxiety,thoughts and behavior.     Patient gradually progressing towards goals.    Patient is encouraged to participate in groups and assisted to develop healthy coping skills.     VS reviewed: /72   Pulse 85   Temp 98.5  F (36.9  C) (Oral)   Resp 16   Wt 87.1 kg (192 lb)   SpO2 99%   BMI 31.95 kg/m      Length of stay: 43    Refer to daily team meeting notes for individualized plan of care. Nursing will continue to assess.

## 2023-06-04 NOTE — PLAN OF CARE
Goal Outcome Evaluation:    Patient is out pacing the hallway with headphones on. Patient is calm and pleasant on approach. Patient verbalized worried that the SW will not fill out his benefit documents in time for discharge. Patient was reassured that the paperwork is on the social workers desk and she will see it first thing on Monday morning. Patient had several questions about [facility]S, such as what is or what isn't allowed?, if he can have his cell phone or not? And if he can go out and come back as he pleases? Patient is also not interested in the Freestyle Candi and would like to continue with manual blood sugar checks. Before dinner blood sugar was 162. Patient ate 100% of his meals and snacks. No prn's given or cheeking noted this shift.  Vitals:./77   Pulse 88   Temp 98.2  F (36.8  C) (Oral)   Resp 16   Wt 87.1 kg (192 lb)   SpO2 97%   BMI 31.95 kg/m  .

## 2023-06-05 LAB
GLUCOSE BLDC GLUCOMTR-MCNC: 143 MG/DL (ref 70–99)
GLUCOSE BLDC GLUCOMTR-MCNC: 99 MG/DL (ref 70–99)

## 2023-06-05 PROCEDURE — G0177 OPPS/PHP; TRAIN & EDUC SERV: HCPCS

## 2023-06-05 PROCEDURE — 250N000012 HC RX MED GY IP 250 OP 636 PS 637

## 2023-06-05 PROCEDURE — 99232 SBSQ HOSP IP/OBS MODERATE 35: CPT | Performed by: PSYCHIATRY & NEUROLOGY

## 2023-06-05 PROCEDURE — 124N000002 HC R&B MH UMMC

## 2023-06-05 PROCEDURE — 250N000013 HC RX MED GY IP 250 OP 250 PS 637: Performed by: NURSE PRACTITIONER

## 2023-06-05 PROCEDURE — 90853 GROUP PSYCHOTHERAPY: CPT

## 2023-06-05 RX ORDER — METFORMIN HCL 500 MG
1000 TABLET, EXTENDED RELEASE 24 HR ORAL 2 TIMES DAILY WITH MEALS
Status: DISCONTINUED | OUTPATIENT
Start: 2023-06-06 | End: 2023-06-06 | Stop reason: HOSPADM

## 2023-06-05 RX ORDER — METFORMIN HYDROCHLORIDE EXTENDED-RELEASE TABLETS 1000 MG/1
1000 TABLET, FILM COATED, EXTENDED RELEASE ORAL 2 TIMES DAILY
Qty: 60 TABLET | Refills: 1 | Status: SHIPPED | OUTPATIENT
Start: 2023-06-05

## 2023-06-05 RX ADMIN — RISPERIDONE 4 MG: 4 TABLET ORAL at 20:56

## 2023-06-05 RX ADMIN — INSULIN GLARGINE 15 UNITS: 100 INJECTION, SOLUTION SUBCUTANEOUS at 08:11

## 2023-06-05 RX ADMIN — METFORMIN ER 500 MG 2000 MG: 500 TABLET ORAL at 08:12

## 2023-06-05 ASSESSMENT — ACTIVITIES OF DAILY LIVING (ADL)
ADLS_ACUITY_SCORE: 29
DRESS: INDEPENDENT
ADLS_ACUITY_SCORE: 32
ORAL_HYGIENE: INDEPENDENT
ADLS_ACUITY_SCORE: 29
HYGIENE/GROOMING: INDEPENDENT
ADLS_ACUITY_SCORE: 29
ADLS_ACUITY_SCORE: 29
LAUNDRY: WITH SUPERVISION
ADLS_ACUITY_SCORE: 29
ADLS_ACUITY_SCORE: 29
ADLS_ACUITY_SCORE: 32
ADLS_ACUITY_SCORE: 32
ADLS_ACUITY_SCORE: 29
ADLS_ACUITY_SCORE: 32
ADLS_ACUITY_SCORE: 32

## 2023-06-05 NOTE — CARE PLAN
06/05/23 1459   Group Therapy Session   Group Attendance attended group session   Time Session Began 1300   Time Session Ended 1345   Total Time (minutes) 45   Total # Attendees 4   Group Type life skill;other (see comments)  (OT)   Group Topic Covered cognitive activities;balanced lifestyle;leisure exploration/use of leisure time   Group Session Detail OT - Topic:  Focus of group activity was use of cognitive techniques through word associations with working toward matching other players. Also opportunity to practice socialization and ways to support others through enjoyable leisure activities.   Patient Response/Contribution able to recall/repeat info presented;cooperative with task;expressed understanding of topic   Patient Participation Detail Pt easily engaged in the activity although slightly impatient with waiting for directions and start of the activity. He was able to identify some words yet on occasion was observed to be looking at others answers and using them.

## 2023-06-05 NOTE — PLAN OF CARE
Problem: Suicidal Behavior  Goal: Suicidal Behavior is Absent or Managed  Outcome: Progressing  Flowsheets (Taken 6/5/2023 1351)  Mutually Determined Action Steps (Suicidal Behavior Absent/Managed): verbalizes safety check rationale     Problem: Adult Behavioral Health Plan of Care  Goal: Adheres to Safety Considerations for Self and Others  Outcome: Progressing  Flowsheets (Taken 6/5/2023 1351)  Adheres to Safety Considerations for Self and Others: making progress toward outcome     Pt denies anxiety and depression. He denies SI/SIB/HI/AH/VH. Pt contracts for safety. Pt denies pain. VSS. Pt observed to be cooperative. Pt presents with a flat, blunted affect. Pt's speech observed to be clear and coherent. Pt's eye contact observed to be appropriate. Pt observed to be restlesss this morning. This morning, pt frequently sought out writer and additional staff to discuss his discharge tomorrow morning. Writer explained to him the process, as well as the plan for tomorrow, and pt acknowledged an understanding. Pt observed to be calmer this afternoon. Pt visible on unit attending meals, watching television, pacing the hallway, talking on the phone, and socializing with select peers. Pt visible intermittently resting in his room. Pt attended group this shift.     Per previous shift report, pt requested to change his metformin dose and also requested to use a glucometer instead of the freestyle mark device/sensor at discharge. Pt again brought up his request to decrease metformin medication or to split the doses in half this morning. Writer paged IM regarding metformin and pt's medication was changed to 1,000 mg twice daily, instead of taking the 2,000 mg dose in the morning, as pt was complaining of GI upset. Pt informed of this. This morning, pt again requested to use a glucometer instead of the freestyle mark device and sensor at discharge. Writer spoke to pt's provider and diabetes educator regarding this, as well  as the discharge pharmacy. Pt's freestyle mark device, metformin, and flonase were sent back to discharge pharmacy. Pt received a glucometer, lancets, testing strips, and an updated metformin prescription. Writer left sticky note to treatment team to notify them that pt has two bags of medications locked in the medication room, as well as pt's Lantus in the fridge. Writer encouraged pt to practice checking his BG this morning as he will need to be able to know how to check them with a glucometer after discharge. BG before breakfast 99. Per CTC, pt will discharge tomorrow morning @ 0930 via taxi. Writer called to order an early tray for pt in the morning. Writer notified oncoming shift to help pt tonight to pack and gather his belongings. Pt reports he is looking forward to discharging. He is medication compliant. Pt denies any additional questions or concerns at this time. He remains on cheeking precautions - no episodes of cheeking noted. Will continue to monitor and assist as needed.

## 2023-06-05 NOTE — PLAN OF CARE
Goal Outcome Evaluation:     [facility] [phone]        : 8:30 AM  [place] [address] [address] [address]  Will call  [phone]. on arrival.    Drop off:  [facility]  [address] [place] [address]    Transportation Co.: [employer]  Phone: [phone]    -Breanna Mathis  Adult Behavioral Health Care Coordinator

## 2023-06-05 NOTE — PLAN OF CARE
Problem: Sleep Disturbance  Goal: Adequate Sleep/Rest  Outcome: Progressing   Goal Outcome Evaluation:  Pt slept throughout the night without any difficulties, noted behaviors or complaints.No PRN's given. Safety maintained.No complaints this shift.Slept for 7hrs and currently still sleeping.

## 2023-06-05 NOTE — PROGRESS NOTES
06/05/23 1600   Group Therapy Session   Group Attendance attended group session   Time Session Began 1410   Time Session Ended 1500   Total Time (minutes) 50   Total # Attendees 5   Group Type psychotherapeutic   Group Topic Covered cognitive therapy techniques   Group Session Detail DBT house   Patient Response/Contribution cooperative with task;discussed personal experience with topic;listened actively     Pt is excited about discharge tomorrow. He however perseverates on how others get to go home, he wants to go home to family. Writer had to review his plan to discharge to [facility] and why. He was then accepting and asked not to compare himself to peers plans as each individual on the unit has their own plan set by the team. He wanted to know if he could drive his car. Writer deferred him to ask questions of the IRTS. He was excited he may have day passes to see family.

## 2023-06-05 NOTE — PLAN OF CARE
Assessment/Intervention/Current Symtoms and Care Coordination  Current Symptoms include the following: Psychosis      Pt signed PD and I sent it to TCM for signature after calling him and speaking on the phone about the discharge.  I have asked CC to order cab for 9:30AM tomorrow.  I have prepped the AVS.  Pt hda papers to fill out for financial opinion and MD filled this out and ot was satisfied and was giving this to his father.    Discharge Plan or Goal  Pending stabilization & development of a safe discharge plan.  Considerations include: [facility] ([facility]): [facility]      Barriers to Discharge  Patient requires further psychiatric stabilization due to current symptomology      Referral Status  [facility] ([facility]):       Legal Status  Patient is under MI commitment in [facility]

## 2023-06-05 NOTE — PROGRESS NOTES
Therapy Progress note:    Goal:Accept discharge Tuesday and move from stn 4A to stn 30    Progress (notes and patient report, issues discussed): writer asked pt if he would like to meet today and he said not needed, he is happy about discharge tomorrow and attending groups.    Symptom reduction/increase: less anxiety about discharge and new unit    Intervention: just inquired if he would like a check in and he declined    Plan: Discharge Tuesday to [facility]

## 2023-06-05 NOTE — CARE PLAN
06/05/23 1456   Group Therapy Session   Group Attendance attended group session   Time Session Began 1115   Time Session Ended 1200   Total Time (minutes) 45   Total # Attendees 7   Group Type life skill;other (see comments)  (OT)   Group Topic Covered balanced lifestyle;coping skills/lifestyle management   Group Session Detail OT - Coping:  Focus of group is on identification of strategies to help manage mental health with categories of activities, emotions management, and cognitive techniques using the acronym ACCEPTS.    Patient Response/Contribution able to recall/repeat info presented;expressed understanding of topic   Patient Participation Detail Pt was generally quiet and appeared to listen at times and briefly would share a couple of examples with more concrete answers. He focused on activities and that he likes to walk.

## 2023-06-05 NOTE — CARE PLAN
06/05/23 1439   Group Therapy Session   Group Attendance attended group session   Time Session Began 1015   Time Session Ended 1105   Total Time (minutes) 50   Total # Attendees 7   Group Type life skill;task skill;other (see comments)  (OT)   Group Topic Covered balanced lifestyle;coping skills/lifestyle management;cognitive activities;structured socialization;leisure exploration/use of leisure time   Group Session Detail OT Clinic: Pt actively participated in occupational therapy clinic to facilitate coping skill exploration, creative expression within personally meaningful activities, and clinical observation of social, cognitive, and kinesthetic performance skills.    Patient Response/Contribution able to recall/repeat info presented;cooperative with task   Patient Participation Detail Pt asked what he could do in the group and after given a couple of options, noted he would do a word find puzzle again. Staff offered a simpler one and he was able to complete it. He focused on this a few minutes at a time and then would listen to others conversation or look out the window. Affect was blunt.

## 2023-06-05 NOTE — PROGRESS NOTES
Brief Medicine Note    Medicine contacted today regarding patient's diabetes medications and discharge tomorrow.  Since admission, has been on 2000 mg of metformin every morning, but at doses this high, the medication is typically split into several doses during the day to prevent GI upset and adverse affects. Pt has been experiencing nausea w/ taking 2000mg every morning. Thus, would recommend that psychiatry discharge him on Metformin 1000 mg every morning, and 1000 mg every evening, and continue Lantus qAM that he has been on here (15 units). Follow-up with PCP regarding diabetes management.    Rajeev Valero PA-C  Internal Medicine [facility]  [facility]  Pager [phone]

## 2023-06-05 NOTE — PROGRESS NOTES
[facility], [place]   Psychiatric Progress Note        Interim History:     The patient's care was discussed with the treatment team during the daily team meeting and/or staff's chart notes were reviewed.  Staff report patient was pleasant. Patient would like his Flonase discontinued. Reports that he would like his metformin split to be multiple times per day and would prefer to do finger stick glucose checks instead of Freestyle Candi as the sensor bothers him. BG this morning was 99. Was compliant with meds and denied having side effects. He continues to attend some groups.    Met with patient: Patient was seen in conference room in presence of PA student. He reported being in a good mood and excited for discharge tomorrow. Inquired about medical benefit paperwork, which was completed and provided to him. Confirmed that he had no concerns about med side effects. Stated that he was looking forward to his discharge to [facility] on Tuesday.    We discussed that we would contact IM about splitting his metformin dose throughout the day and switching to finger stick BG checks instead of Freestyle Candi. He had no questions or concerns for us today.     Hardeman back from IM - Split metformin to be 1000 mg in the morning and 1000 mg in the evening. Also cancelled Freestyle Candi order and ordered glucometer, strips, and lancets. Discontinued Flonase per patient request. Pharmacy updated so all medications will be ready for discharge tomorrow.          Medications:       fluticasone  1 spray Both Nostrils BID     risperiDONE  4 mg Oral QPM    Or     haloperidol lactate  5 mg Intramuscular QPM     insulin glargine  15 Units Subcutaneous QAM AC     [START ON 6/6/2023] metFORMIN  1,000 mg Oral BID w/meals          Allergies:   No Known Allergies       Labs:     Recent Results (from the past 24 hour(s))   Glucose by meter    Collection Time: 06/04/23  5:40 PM   Result Value Ref Range     GLUCOSE BY METER POCT 162 (H) 70 - 99 mg/dL   Glucose by meter    Collection Time: 06/05/23  8:05 AM   Result Value Ref Range    GLUCOSE BY METER POCT 99 70 - 99 mg/dL          Psychiatric Examination:     /76 (BP Location: Right arm, Patient Position: Sitting, Cuff Size: Adult Regular)   Pulse 98   Temp 98.2  F (36.8  C) (Oral)   Resp 16   Wt 87.1 kg (192 lb)   SpO2 98%   BMI 31.95 kg/m    Weight is 192 lbs 0 oz  Body mass index is 31.95 kg/m .    Orthostatic Vitals       Most Recent      Sitting Orthostatic /76 06/05 0853    Sitting Orthostatic Pulse (bpm) 88 06/05 0853    Standing Orthostatic /73 06/05 0853    Standing Orthostatic Pulse (bpm) 85 06/05 0853        Appearance: awake, alert and dressed in hospital scrubs  Attitude:  cooperative  Eye Contact:  fair  Mood: less anxious  Affect:  constricted mobility and less labile  Speech:  clear, coherent  Psychomotor Behavior:  no evidence of tardive dyskinesia, dystonia, or tics and intact station, gait and muscle tone  Throught Process:  linear  Associations:  no loose associations  Thought Content:  denies Suicidal ideation, Homicidal thoughts, Auditory hallucinations and Visual hallucinations.   Insight:  partial  Judgement:  limited  Oriented to:  time, person, and place  Attention Span and Concentration:  fair  Recent and Remote Memory:  fair    Clinical Global Impressions  First:  Considering your total clinical experience with this particular patient population, how severe are the patient's symptoms at this time?: 7 (04/22/23 0916)  Compared to the patient's condition at the START of treatment, this patient's condition is: 4 (04/22/23 0916)  Most recent:  Considering your total clinical experience with this particular patient population, how severe are the patient's symptoms at this time?: 7 (05/01/23 0731)  Compared to the patient's condition at the START of treatment, this patient's condition is: 4 (05/01/23 0731)          Precautions:     Behavioral Orders   Procedures     Cheeking Precautions (behavioral units)     Patient Observed swallowing PO medications; Patient asked to drink water after swallowing medication; Patient in Staff line of sight for 15 minutes after medication given; Mouth checks after PO administration (patient asked to open mouth and stick out their tongue).     Code 1     Petition for commitment     MI with [facility] (requested meds Abilify, Risperdal, Haldol, Zyprexa) in [facility].     Routine Programming     As clinically indicated     Status 15     Every 15 minutes.          DIagnoses:     Unspecified psychosis (differentials include schizophrenia spectrum disorder and bipolar disorder)  Type 2 diabetes         Plan:     Continue with planned discharge to [facility] on 6/6. Will continue to provide support and structure and encourage group participation. No medication changes today 6/5/23. Updated metformin orders, discontinued Candi, ordered BG testing supplies.     I was present with PA student who participated in the service and in the documentation of the note. I have verified the history and personally performed the physical exam and medical decision making. I agree with the assessment and plan of care as documented in the note.       Total time spent was 37 minutes. Over 50% of times was spent counseling and coordination of care regarding coping skills, medication and discharge planning.

## 2023-06-06 VITALS
TEMPERATURE: 97.7 F | BODY MASS INDEX: 31.68 KG/M2 | OXYGEN SATURATION: 98 % | SYSTOLIC BLOOD PRESSURE: 112 MMHG | HEART RATE: 94 BPM | RESPIRATION RATE: 17 BRPM | WEIGHT: 190.4 LBS | DIASTOLIC BLOOD PRESSURE: 75 MMHG

## 2023-06-06 LAB — GLUCOSE BLDC GLUCOMTR-MCNC: 114 MG/DL (ref 70–99)

## 2023-06-06 PROCEDURE — 99239 HOSP IP/OBS DSCHRG MGMT >30: CPT | Performed by: PSYCHIATRY & NEUROLOGY

## 2023-06-06 PROCEDURE — 250N000013 HC RX MED GY IP 250 OP 250 PS 637: Performed by: PSYCHIATRY & NEUROLOGY

## 2023-06-06 RX ADMIN — METFORMIN HYDROCHLORIDE 1000 MG: 500 TABLET, EXTENDED RELEASE ORAL at 07:51

## 2023-06-06 RX ADMIN — INSULIN GLARGINE 15 UNITS: 100 INJECTION, SOLUTION SUBCUTANEOUS at 07:53

## 2023-06-06 ASSESSMENT — ACTIVITIES OF DAILY LIVING (ADL)
ADLS_ACUITY_SCORE: 32
ADLS_ACUITY_SCORE: 32
DRESS: INDEPENDENT
ORAL_HYGIENE: INDEPENDENT
LAUNDRY: WITH SUPERVISION
ADLS_ACUITY_SCORE: 32
ADLS_ACUITY_SCORE: 32
HYGIENE/GROOMING: INDEPENDENT
ADLS_ACUITY_SCORE: 32

## 2023-06-06 NOTE — PROGRESS NOTES
Discharge orders are received.  Writer reviewed and discussed discharge instructions, follow-up care, future appointments, and medication administration with pt. Pt acknowledges an understanding.  Patient continues to deny SI/SIB/HI/AH/VH. Pt contracts for safety within the community. Pt verbalized understanding of discharge instructions. Pt received personal belongings.  All forms are signed. Pt denies any questions or concerns at this time. Patient to discharge to [facility]. Pt discharged around 0930 via taxi. Staff escorted pt out of the building.

## 2023-06-06 NOTE — PLAN OF CARE
Problem: Sleep Disturbance  Goal: Adequate Sleep/Rest  Outcome: Progressing   Goal Outcome Evaluation:  [id number]: Pt sleeping at the start of the shift in no apparent distress. Continue on suicide precaution. No SI related behavior noted.Woke up twice during the night, declined need for prn's  Slept for 6.5 hrs. This morning, came to the desk requesting for his personal belongings, became slightly agitated and loud when writer tried to explain unit/hospital policies. Needed some redirections, became calm and went to his room.Continue to monitor.

## 2023-06-06 NOTE — CARE PLAN
06/05/23 1940   Group Therapy Session   Group Attendance attended group session   Time Session Began 1705   Time Session Ended 1750   Total Time (minutes) 45   Total # Attendees 7   Group Type community;recreation   Patient Participation Detail OT: Cognitive activity via leisure task for attention, sequencing, communication, new learning, retention, reality-orientation, social engagement, leisure exploration and coping with symptoms.  Pt Response: Pt presented with neutral mood, flat affect. Pt had some difficulty tracking the multi-step activity, but was receptive to gentle cueing from staff/peers. He was concrete in his approach, exhibiting difficulty with abstract concepts/clue-giving. Pt did maintain good engagement in the activity and appeared to enjoy the group to some extent. Pt was social with peers.

## 2023-06-06 NOTE — PLAN OF CARE
"  Problem: Suicidal Behavior  Goal: Suicidal Behavior is Absent or Managed  Outcome: Met     Problem: Depressive Symptoms  Goal: Depressive Symptoms  Description: Signs and symptoms of listed problems will be absent or manageable.  Outcome: Met     Pt denies anxiety and depression. He denies SI/SIB/HI/AH/VH. He contracts for safety. He denies pain. A/O. Writer encouraged pt to practice checking his BG this morning with writer supervising. Pt able to check BG independently. BG before breakfast 114. Pt received his scheduled morning medications. Pt received an early breakfast tray this morning - pt ate 100%. Pt asked about his discharge plan for today, as well as his belongings. Writer informed pt that a staff member will assist with packing his belongings after breakfast and that he will meet with provider as well prior to his discharge. Writer also informed pt that she would go through discharge paperwork and medications with pt. Pt accepting of this. Pt presents with a flat, blunted affect. Pt's speech observed to be clear and coherent. Pt observed to be restless this morning. Pt reports he has been here for \"three months\" and cannot wait to leave today. Pt visible on unit pacing the hallway and talking on the phone. The plan is for pt to be picked up by a taxi @ 8402 to go to I[facility]S. He is medication compliant. He remains on cheeking precautions - no cheeking behaviors noted. Will continue to monitor and assist as needed.   "

## 2023-06-06 NOTE — DISCHARGE SUMMARY
"Psychiatric Discharge Summary    Alexander Mccoy MRN# [id number]   Age: 38 year old YOB: 1985     Date of Admission:  4/22/2023  Date of Discharge:  6/6/2023  Admitting Physician:  Mignon Morales MD  Discharge Physician:  Ishmael Card MD (Contact: [phone])         Event Leading to Hospitalization:     \"I don't know why I am here\" - chief complaint.    Alexander Mccoy is a 38-year-old male admitted to [address] on 4/22/2023.  He was admitted on a court hold from the [facility] ER due to psychosis.  He stated that he went to a police station because he believed undercover governmental agents were tracking him.  He said he no longer has a job because the government agents had infiltrated through his phone and computer.  He became involved in an altercation with the  at the [facility].  UTOX was negative and he denies substance abuse.  He was not taking any psychotropic medications prior to admission.  While in the ER, he was intermittently agitated.  He called 911.  During his time in the ER, a petition for commitment MI with [employer] was filed in [facility], and he was placed on a court hold.  He has type 2 diabetes.  HbA1c was 10.9.  He reports taking Metformin XR prior to admission.  While in the ER, he was inconsistently adherent to insulin and BG checks.  During the admission assessment, he reports rarely checking his blood glucose and states that his BG never exceeds 200, however BGs have often been over 300 since he was in the ER, which he relates to stress.  He says if he were not in the hospital, they would be lower.   He was not taking any psychotropic medications prior to admission.  Abilify was initiated in the ER, but he refused the second dose.  He states he will not take psychotropic medications nor consent to labs or insulin while hospitalized.  Throughout the conversation, he rarely responded directly to provider's " inquiries and instead made comments about being tracked and the injustices of being hospitalized.         See Admission note by by admitting physician/nurse-practitioner Kaylie Pope NP from 4/22/23 for additional details.          DIagnoses:     Unspecified psychosis, schizophrenia spectrum disorder diagnosis appears to be more likely  Type 2 diabetes         Labs:      Latest Reference Range & Units Most Recent   Sodium 136 - 145 mmol/L 132 (L)  4/23/23 13:20   Potassium 3.4 - 5.3 mmol/L 4.1  4/23/23 13:20   Chloride 98 - 107 mmol/L 97 (L)  4/23/23 13:20   Carbon Dioxide (CO2) 22 - 29 mmol/L 24  4/23/23 13:20   Urea Nitrogen 6.0 - 20.0 mg/dL 17.4  4/23/23 13:20   Creatinine 0.67 - 1.17 mg/dL 0.95  4/23/23 13:20   GFR Estimate >60 mL/min/1.73m2 >90  4/23/23 13:20   Calcium 8.6 - 10.0 mg/dL 9.8  4/23/23 13:20   Anion Gap 7 - 15 mmol/L 11  4/23/23 13:20   Albumin 3.5 - 5.2 g/dL 3.9  4/23/23 13:20   Protein Total 6.4 - 8.3 g/dL 7.1  4/23/23 13:20   Alkaline Phosphatase 40 - 129 U/L 89  4/23/23 13:20   ALT 10 - 50 U/L 64 (H)  4/23/23 13:20   AST 10 - 50 U/L 32  4/23/23 13:20   Arsenic <=12.0 ug/L <10.0  4/26/23 12:13   Bilirubin Total <=1.2 mg/dL 0.7  4/23/23 13:20   Folate 4.6 - 34.8 ng/mL 23.5  4/26/23 10:54   Glucose 70 - 99 mg/dL 282 (H)  4/23/23 13:20   Hemoglobin A1C <5.7 % 10.9 (H)  4/18/23 02:40   Lead Venous Blood <=4.9 ug/dL <2.0  4/26/23 12:13   Mercury <=10.0 ug/L <2.5  4/26/23 12:13   TSH 0.30 - 4.20 uIU/mL 2.53  4/18/23 02:40   Vitamin B12 232 - 1,245 pg/mL 832  4/26/23 10:54   GLUCOSE BY METER POCT 70 - 99 mg/dL 114 (H)  6/6/23 07:48   WBC 4.0 - 11.0 10e3/uL 9.9  4/18/23 02:40   Hemoglobin 13.3 - 17.7 g/dL 17.7  4/18/23 02:40   Hematocrit 40.0 - 53.0 % 51.4  4/18/23 02:40   Platelet Count 150 - 450 10e3/uL 233  4/18/23 02:40   RBC Count 4.40 - 5.90 10e6/uL 5.94 (H)  4/18/23 02:40   MCV 78 - 100 fL 87  4/18/23 02:40   MCH 26.5 - 33.0 pg 29.8  4/18/23 02:40   MCHC 31.5 - 36.5 g/dL 34.4  4/18/23 02:40    RDW 10.0 - 15.0 % 11.8  4/18/23 02:40   % Neutrophils % 55  4/18/23 02:40   % Lymphocytes % 32  4/18/23 02:40   % Monocytes % 8  4/18/23 02:40   % Eosinophils % 4  4/18/23 02:40   % Basophils % 1  4/18/23 02:40   Absolute Basophils 0.0 - 0.2 10e3/uL 0.1  4/18/23 02:40   Absolute Eosinophils 0.0 - 0.7 10e3/uL 0.4  4/18/23 02:40   Absolute Immature Granulocytes <=0.4 10e3/uL 0.0  4/18/23 02:40   Absolute Lymphocytes 0.8 - 5.3 10e3/uL 3.1  4/18/23 02:40   Absolute Monocytes 0.0 - 1.3 10e3/uL 0.8  4/18/23 02:40   % Immature Granulocytes % 0  4/18/23 02:40   Absolute Neutrophils 1.6 - 8.3 10e3/uL 5.5  4/18/23 02:40   Absolute NRBCs 10e3/uL 0.0  4/18/23 02:40   NRBCs per 100 WBC <1 /100 0  4/18/23 02:40   Color Urine Colorless, Straw, Light Yellow, Yellow  Straw  4/18/23 03:57   Appearance Urine Clear  Clear  4/18/23 03:57   Glucose Urine Negative mg/dL >=1000 !  4/18/23 03:57   Bilirubin Urine Negative  Negative  4/18/23 03:57   Ketones Urine Negative mg/dL Negative  4/18/23 03:57   Specific Gravity Urine 1.003 - 1.035  1.030  4/18/23 03:57   pH Urine 5.0 - 7.0  7.0  4/18/23 03:57   Protein Albumin Urine Negative mg/dL Negative  4/18/23 03:57   Urobilinogen mg/dL Normal, 2.0 mg/dL Normal  4/18/23 03:57   Nitrite Urine Negative  Negative  4/18/23 03:57   Blood Urine Negative  Negative  4/18/23 03:57   Leukocyte Esterase Urine Negative  Negative  4/18/23 03:57   WBC Urine <=5 /HPF <1  4/18/23 03:57   RBC Urine <=2 /HPF 0  4/18/23 03:57   Squamous Epithelial /HPF Urine <=1 /HPF <1  4/18/23 03:57   Lyme Disease Antibodies Serum <0.90  0.32  4/26/23 10:54   Treponema Antibodies Nonreactive  Nonreactive  4/26/23 10:54   SARS CoV2 PCR Negative  Negative  4/18/23 02:41   HIV Antigen Antibody Combo Nonreactive  Nonreactive  4/26/23 10:54   Amphetamine Qual Urine Screen Negative  Screen Negative  4/18/23 03:57   Benzodiazepine Urine Screen Negative  Screen Negative  4/18/23 03:57   Opiates Qualitative Urine Screen Negative   Screen Negative  4/18/23 03:57   Cannabinoids Qual Urine Screen Negative  Screen Negative  4/18/23 03:57   Barbiturates Qual Urine Screen Negative  Screen Negative  4/18/23 03:57   ANTI NUCLEAR CARLIE IGG BY IFA WITH REFLEX  Rpt  4/26/23 10:54   Ceruloplasmin 20 - 60 mg/dL 24  4/26/23 10:54   CT HEAD W/O CONTRAST  Rpt  4/18/23 03:19   Alcohol ethyl <=0.01 g/dL <0.01  4/18/23 02:40   ZACH interpretation Negative  Negative  4/26/23 10:54   Cocaine Urine Screen Negative  Screen Negative  4/18/23 03:57   (L): Data is abnormally low  (H): Data is abnormally high  !: Data is abnormal  Rpt: View report in Results Review for more information    EXAM: CT HEAD W/O CONTRAST  LOCATION: [facility]  DATE/TIME: 4/18/2023 3:19 AM CDT     INDICATION: Paranoid behaviors, ?mental health crisis, no hx of psychosis.  COMPARISON: None.  TECHNIQUE: Routine CT Head without IV contrast. Multiplanar reformats. Dose reduction techniques were used.     FINDINGS:  INTRACRANIAL CONTENTS: No intracranial hemorrhage, extraaxial collection, or mass effect.  No CT evidence of acute infarct. Normal parenchymal attenuation. Normal ventricles and sulci.      VISUALIZED ORBITS/SINUSES/MASTOIDS: No intraorbital abnormality. Aerated secretions are present within the right maxillary sinus. No middle ear or mastoid effusion.     BONES/SOFT TISSUES: No calvarial fracture. Midline posterior scalp soft tissue swelling.                                                                      IMPRESSION:  1.  No acute intracranial process.         Consults:   Consultation during this admission received from endocrinology, internal medicine who helped with management of patient's DM, 2. Pharmacy was consulted to run test claim for diabetic med as well as dental services due to concern of toothache. I appreciate help of all the above services with this patient.          Hospital Course:   Alexander Mccoy was admitted to [facility] with attending   "Kaylie Pope NP. The patient was placed under status 15 (15 minute checks) to ensure patient safety. He presented as paranoid, was talking openly about different government agencies trying to harm him and being after him. Petition for commitment with [facility] was filed and supported by the [facility]. After proper court hearings patient was fully committed and Jar[facility]. He, especially, in the beginning of hospitalization showed poor insight and judgment, insisted that he was not delusional and things happening around him in community were real. He reported having headaches after starting Abilify. He continued to perseverate on his desire to be discharged. Family reported to staff that Alexander had paranoid delusions for about one year. He was switched to Haldol which he didn't like either and was making statements like: \" Both Abilify and Haldol \"did something to my brain, it feels like somebody is punching my brain.  I'm not going to take a psycho medication.  I would do an antidepressant or sleep medication but I'm not depressed.\"  He said that antipsychotics \"damage my brain and mind.  I studied Abilify when I was younger.  People knew it had side effects.  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 His diabetic care was coordinated by IM and endocrinology services. BS were gradually improving. Alexander continued to complain that he didn't like neither Haldol nor Abilify. He was explained by Kaylie Pope NP that his [facility] had 2 more options: Risperdal and Zyprexa. Alexander chose to try Risperdal and was switched to it. And appeared to be tolerating and liking it more. Psychotic symptoms continued to be slowly improving. He became more compliant with accuchecks, diabetic management. An attempt was made to encourage Alexander to do accuchecks on his own, but he said that he could " "not do them. He was accepted to an IRTS program called [facility] and was waiting for his transfer there. While waiting for the above transfer on 5/30 patient was transferred overnight from [facility] to [facility] due to concerns of his roommate disrobing there and no other options for placing him there. He continued to be under care of this provider after his transfer to [facility]. Alexander appeared to be more reality based and denied experience of Auditory hallucinations and Visual hallucinations, but when asked more in details tended to give answers like: \"I thought that there were people after me. I was wrong, but I don't have a mental illness\". On the day of discharge he denied experiencing all MI, repeatedly contracted for safety.      Alexander Mccoy did participate in groups and was visible in the milieu.     The patient's symptoms of psychosis improved.     Alexander Mccoy was released to IR. At the time of discharge Alexander Mccoy was determined to not be a danger to himself or others.          Discharge Medications:     Discharge Medication List as of 6/6/2023  8:32 AM      START taking these medications    Details   Alcohol Swabs (ALCOHOL WIPES) 70 % PADS 1 each daily, Disp-100 each, R-1, E-Prescribe      blood glucose (NO BRAND SPECIFIED) lancets standard Use to test blood sugar 2 times daily or as directed.Disp-100 Lancet, [id number]      blood glucose (NO BRAND SPECIFIED) test strip Use to test blood sugar 2 times daily or as directed., Disp-200 strip, R-0, E-Prescribe      blood glucose monitoring (NO BRAND SPECIFIED) meter device kit Use to test blood sugar 2 times daily or as directed.Disp-1 kit, [id number], let us know if it doesn't come along with strips.      gabapentin (NEURONTIN) 300 MG capsule Take 1 capsule (300 mg) by mouth 3 times daily as needed for neuropathic pain, Disp-90 capsule, R-1, E-Prescribe      hydrOXYzine (ATARAX) 25 MG tablet Take 1 tablet (25 mg) by mouth every " 4 hours as needed for anxiety, Disp-60 tablet, R-1, E-Prescribe      ibuprofen (ADVIL/MOTRIN) 600 MG tablet Take 1 tablet (600 mg) by mouth every 6 hours as needed for moderate pain, Disp-100 tablet, R-1, E-Prescribe      insulin glargine (LANTUS PEN) 100 UNIT/ML pen Inject 15 Units Subcutaneous every morning (before breakfast), Disp-15 mL, R-1, E-PrescribeIf Lantus is not covered by insurance, may substitute Basaglar or Semglee or other insulin glargine product per insurance preference at same dose and frequency.         insulin pen needle (31G X 5 MM) 31G X 5 MM miscellaneous Use 1 pen needles dailyDisp-100 each, [id number]      melatonin 3 MG tablet Take 1 tablet (3 mg) by mouth nightly as needed for sleep, Disp-30 tablet, R-1, E-Prescribe      risperiDONE (RISPERDAL) 4 MG tablet Take 1 tablet (4 mg) by mouth every evening, Disp-30 tablet, R-1, E-Prescribe      Sharps Container MISC 1 each daily, Disp-1 each, R-0, E-Prescribe      sodium chloride (OCEAN) 0.65 % nasal spray Spray 1 spray into both nostrils every hour as needed for congestion, Disp-88 mL, R-1, E-Prescribe         CONTINUE these medications which have CHANGED    Details   metFORMIN osmotic (FORTAMET) 1000 MG 24 hr tablet Take 1 tablet (1,000 mg) by mouth 2 times daily, Disp-60 tablet, R-1, E-Prescribe         CONTINUE these medications which have NOT CHANGED    Details   Continuous Blood Gluc  (FREESTYLE FRANKI 2 READER) DAPHNE 1 each by Device route 2 times daily .  Use to read blood sugars as per 's instructions., Disp-1 each, R-0, E-Prescribe         STOP taking these medications       Continuous Blood Gluc Sensor (FREESTYLE FRANKI 2 SENSOR) MISC Comments:   Reason for Stopping:         fluticasone (FLONASE) 50 MCG/ACT nasal spray Comments:   Reason for Stopping:                    Psychiatric Examination:   Appearance:  awake, alert, dressed in hospital scrubs and appeared as age stated  Attitude:  cooperative  Eye Contact:   fair  Mood:  better  Affect:  constricted mobility  Speech:  clear, coherent  Psychomotor Behavior:  no evidence of tardive dyskinesia, dystonia, or tics  Thought Process:  linear and goal oriented  Associations:  no loose associations  Thought Content:  no evidence of suicidal ideation or homicidal ideation  Insight:  partial  Judgment:  fair  Oriented to:  time, person, and place  Attention Span and Concentration:  intact  Recent and Remote Memory:  fair  Language: Able to name objects, Able to repeat phrases and Able to read and write  Fund of Knowledge: appropriate  Muscle Strength and Tone: normal  Gait and Station: Normal         Discharge Plan:          Health Care Follow-up:   You are on a plan called [facility]. You will need to coordinate your services through [facility]. Member Services: [phone]  You can also get transportation through this number.  Transportation:  : 8:30 AM  [address] [address]  Will call [phone].on arrival.  Drop off:  [facility]  [address] [place] [address]        [email or web address]     Rashaad Guo  [phone]  [email or web address]     Have supportive housing and work programs that pt's can transition to.    Admission : Direct:  [phone]     Direct to Staff:  [phone]      Main  [phone]                     You will receive psychiatric medication management services at the [facility].        Primary Care Appointment: Monday June 26th at 2:20pm in-person  Be sure to talk to them about your diabetes and ask if you need a referal to endocrinology.   Provider: EDEN Cavanaugh  [facility]  [address] [place] [address]  Phone: [phone]  Fax: [phone]  Notes: Appointment is scheduled to establish care and for hospital follow up/diabetic care. Appointment is scheduled to last 40  minutes. Please bring photo ID/insurance card with you to the appointment. [facility]UC please fax AVS           [facility] File No; [facility]-IV-  You are on a civil commitment.  You have a neuroleptic Order for medications.  Case Management Appointment- You are court ordered to continue to work with your  monthly. Your  will contact you the week of discharge to schedule your next appointment.   Americo Cheatham    @ [facility]   Cell: [phone]  Office: [phone]    [email or web address]  The Health Unit Coordinator has faxed these discharge instructions to Fax: [phone]        Attend all scheduled appointments with your outpatient providers. Call at least 24 hours in advance if you need to reschedule an appointment to ensure continued access to your outpatient providers.        Attestation:  The patient has been seen and evaluated by me,  Ishmael Card MD     Total time spent was 37 minutes. Over 50% of times was spent counseling and coordination of care regarding coping skills, medication and discharge planning.

## 2023-06-06 NOTE — PLAN OF CARE
TCM sent signed  PD after pt left.  I have mailed the PD to pt at [facility].   I have sent one for scanning, placed one in chart and faxed PD and change of status to [facility].      Behavioral Discharge Planning and Instructions    Summary: You were admitted on 4/22/2023  due to psychosis. You were treated by Savannah Pope NP at [address] and later transferred to [facility] where you were treated by Dr. Card. You are being provisionally discharged on 6/6/23 from [place] [address] to [facility] located at [facility] [place] [address].  [facility] provided the cab for your transportation.     Main Diagnosis:   Unspecified psychosis, likely paranoid schizophrenia  Type 2 diabetes, insulin dependent  Low back pain with left-sided sciatica  Mobility on left lower molar    Health Care Follow-up:   You are on a plan called [facility]. You will need to coordinate your services through [facility]. Member Services: [phone]  You can also get transportation through this number.  Transportation:  : 8:30 AM  [place] [address]  Will call [phone].on arrival.  Drop off:  [employer] [facility] [address] [address]  Transportation Co.: [employer]  Phone: [phone]      You are being admitted to a mental health residential treatment center called an [facility].  [facility]  The Health Unit Coordinator will fax these discharge instructions to fax: [phone]    [facility]  [employer] [address] [address]      [email or web address]    Rashaad Guo  [phone]  [email or web address]    Have supportive housing and work programs that pt's can transition to.   Admission : Direct:  [phone]    Direct to Staff:  [phone]     Main  [phone]               You [facility]  [facility].      Primary Care Appointment: Monday June 26th at 2:20pm in-person  Be sure to talk to them about your diabetes and ask if you need a referal to endocrinology.   Provider: EDEN Cavanaugh  [facility]  [address], [place] [address]  Phone: [phone]  Fax: [phone]  Notes: Appointment is scheduled to establish care and for hospital follow up/diabetic care. Appointment is scheduled to last 40 minutes. Please bring photo ID/insurance card with you to the appointment. [facility] please fax AVS        [facility] File No; ZA-UX-  You are on a civil commitment.  You have a neuroleptic Order for medications.  Case Management Appointment- You are court ordered to continue to work with your  monthly. Your  will contact you the week of discharge to schedule your next appointment.   Americo Cheatham    @ [facility]   Cell: [phone]  Office: [phone]    [email or web address]  The Health Unit Coordinator has faxed these discharge instructions to Fax: [phone]      Attend all scheduled appointments with your outpatient providers. Call at least 24 hours in advance if you need to reschedule an appointment to ensure continued access to your outpatient providers.     Major Treatments, Procedures and Findings:  You were provided with: a psychiatric assessment, assessed for medical stability, medication evaluation and/or management, group therapy, milieu management, and medical interventions    Goals accomplished while inpatient     We assisted you with applying for SNAP and Cash Assistance. You will need to contact [facility] once you discharge to inform them that you are no longer hospitalized. You ar required to do this by the end of June or your case will close. You were informed that your case will be transferred to a specific team due to  discharging to a integrated community setting. You will be able to receive cash assistance right away and SNAP after you discharge from [facility]. [phone]    You are interested in working again and maintaining your mental health. [facility] [facility] assists with finding employment while at the [facility]. You can also access their supportive living program if you do not want to return home to live with your parents in the future    CADI Services: Your mental health CM is assisted you with getting SMRT'd and applying for CADI to access services after you complete treatment. Please follow up with your CM.      Dental Resources   You subsequently attended a dental appointment at which time the dentist advised you to remove the tooth because it will eventually fall out, but you declined. You later reported that dental pain abated and mobility of the tooth was reduced. Please contact the [facility] for a follow up appointment as needed [phone]        Symptoms to Report: feeling more aggressive, increased confusion, losing more sleep, mood getting worse, or thoughts of suicide    Early warning signs can include: increased depression or anxiety sleep disturbances increased thoughts or behaviors of suicide or self-harm  increased unusual thinking, such as paranoia or hearing voices    Safety and Wellness:  Take all medicines as directed.  Make no changes unless your doctor suggests them.      Follow treatment recommendations.  Refrain from alcohol and non-prescribed drugs.  If there is a concern for safety, call 911.    Resources:   Crisis Intervention: 407.492.6092 or 185-916-6547 (TTY: 601.348.8294).  Call anytime for help.  National [place] on Mental Illness (www.[place].viraj.org): 361.387.2053 or 286-248-0855.  [place] Association for Children's Mental Health (www.macmh.org): 338.192.6605.  Alcoholics Anonymous (www.alcoholics-anonymous.org): Check your phone book for your local chapter.  Suicide  "Awareness Voices of Education (SAVE) (www.save.org): 748-261-BNRK (2196)  National Suicide Prevention Line (www.mentalhealthmn.org): 446-854-PCZU (2592)  Mental Health Consumer/Survivor Network of [place] (www.mhcsn.net): 264.183.2302 or 889-943-5591  Mental Health Association of [place] (www.mentalhealth.org): 621.384.4386 or 834-886-8130  Self- Management and Recovery Training., SMART-- Toll free: 560.303.9819  www.[employer]  [facility] Crisis Response 000 255-1353  [facility] Crisis (COPE) Response - Adult 887 048-3168  Text 4 Life: txt \"LIFE\" to 82998 for immediate support and crisis intervention  Crisis text line: Text \"[place]\" to 799945. Free, confidential, 24/7.  Crisis Intervention: 171.367.5477 or 073-797-1548. Call anytime for help.   [facility] Crisis Team - Child: [phone]    General Medication Instructions:   See your medication sheet(s) for instructions.   Take all medicines as directed.  Make no changes unless your doctor suggests them.   Go to all your doctor visits.  Be sure to have all your required lab tests. This way, your medicines can be refilled on time.  Do not use any drugs not prescribed by your doctor.  Avoid alcohol.    Advance Directives:   Scanned document on file with [employer] No scanned doc  Is document scanned? Pt states no documents  Honoring Choices Your Rights Handout: Informed and given  Was more information offered? Pt declined    The Treatment team has appreciated the opportunity to work with you. If you have any questions or concerns about your recent admission, you can contact the unit which can receive your call 24 hours a day, 7 days a week. They will be able to get in touch with a Provider if needed. The unit number is [phone].                          "

## 2024-02-28 NOTE — PLAN OF CARE
This writer coverage CTC today.    Pt inquired from this writer to speak with his assigned CTC. Writer informed him that CTC is not in and will return tomorrow. Inquired if there was an urgent need, he stated no.   room air

## 2024-11-11 NOTE — CARE PLAN
Caller: Tonia Ritter    Relationship: Self    Best call back number: [phone]     What is the best time to reach you: ANYTIME, OK TO LEAVE VOICEMAIL.    Who are you requesting to speak with (clinical staff, provider,  specific staff member): CLINICAL STAFF    Do you know the name of the person who called: PATIENT    What was the call regarding: PATIENT ADVISES THAT SHE WAS SUPPOSED TO CALL BACK AND REPORT HER LATEST BLOOD PRESSURE.     BLOOD PRESSURE /79 AS OF 12:37 PM EST ON 11.11.24.         "   06/02/23 1432   Group Therapy Session   Group Attendance attended group session   Time Session Began 1115   Time Session Ended 1200   Total Time (minutes) 45   Total # Attendees 5   Group Type life skill;other (see comments)  (OT)   Group Topic Covered balanced lifestyle;other (see comments);coping skills/lifestyle management  (Sensorimotor)   Group Session Detail OT - Coping:  Focus of group was on learning and practicing the evidenced based practice focused activity of sensory enhanced yoga for self regulation. Offered several techniques and modifications to engage in this type of activity, along with discussion of how to incorporate these types of practices into a weekly routine.   Patient Response/Contribution able to recall/repeat info presented;cooperative with task;expressed understanding of topic   Patient Participation Detail Pt was generally quiet and did note that he had not engaged in any of this type of activity yet he does like to do \"lunges\" for exercise. He did attempt most of the movements/poses and stayed for the group. No comment at end of session.       "
